# Patient Record
Sex: FEMALE | Race: WHITE | Employment: UNEMPLOYED | URBAN - METROPOLITAN AREA
[De-identification: names, ages, dates, MRNs, and addresses within clinical notes are randomized per-mention and may not be internally consistent; named-entity substitution may affect disease eponyms.]

---

## 2020-02-11 ENCOUNTER — OFFICE VISIT (OUTPATIENT)
Dept: OBGYN CLINIC | Age: 35
End: 2020-02-11

## 2020-02-11 VITALS — WEIGHT: 148 LBS

## 2020-02-11 DIAGNOSIS — Z01.419 WELL WOMAN EXAM WITH ROUTINE GYNECOLOGICAL EXAM: Primary | ICD-10-CM

## 2020-02-11 DIAGNOSIS — Z30.432 ENCOUNTER FOR IUD REMOVAL: ICD-10-CM

## 2020-02-11 NOTE — PROGRESS NOTES
Annual exam ages 21-44    Fred Kohler is a ,  27 y.o. female [facility] No LMP recorded. .    She presents for her annual checkup. She is having no significant problems. Desires IUD removal today; she and her  would like a 3rd child. She would like to discuss management of future pregnancy. History of sinus venous thrombosis diagnosed between pregnancies; was on Lovenox during 2nd pregnancy; no complications except retained POC  Hx TSVD x2; Naif Gravel now 6yo; Manuela 7lb Shaquille Tyler now 2.4yo  Reviewed collaborative practice, her hx and use of MFM. Recommend MFM consult      Menstrual status:    Her periods are moderate in flow. She is using three to five pads or tampons per day, usually regular and last 26-30 days. She denies dysmenorrhea. She reports no premenstrual symptoms. Contraception:    The current method of family planning is IUD. Sexual history:     She  reports being sexually active and has had partner(s) who are Male. .    Medical conditions:    Since her last annual GYN exam about two years ago, she has not the following changes in her health history: none. Pap and Mammogram History:    Her most recent Pap smear was normal, obtained 2 year(s) ago. Breast Cancer History/Substance Abuse: negative    Past Medical History:   Diagnosis Date    PCOS (polycystic ovarian syndrome)     Venous thrombosis     Sinus     History reviewed. No pertinent surgical history. Current Outpatient Medications   Medication Sig Dispense Refill    PNV No12-Iron-FA-DSS-OM-3 29 mg iron-1 mg -50 mg CPKD Take  by mouth. Allergies: Patient has no known allergies. Tobacco History:  reports that she has never smoked. She has never used smokeless tobacco.  Alcohol Abuse:  reports current alcohol use. Drug Abuse:  reports no history of drug use.     Family Medical/Cancer History:   Family History   Problem Relation Age of Onset    Breast Cancer Mother 39    Parkinson's Disease Father         Review of Systems - History obtained from the patient  Constitutional: negative for weight loss, fever, night sweats  HEENT: negative for hearing loss, earache, congestion, snoring, sorethroat  CV: negative for chest pain, palpitations, edema  Resp: negative for cough, shortness of breath, wheezing  GI: negative for change in bowel habits, abdominal pain, black or bloody stools  : negative for frequency, dysuria, hematuria, vaginal discharge  MSK: negative for back pain, joint pain, muscle pain  Breast: negative for breast lumps, nipple discharge, galactorrhea  Skin :negative for itching, rash, hives  Neuro: negative for dizziness, headache, confusion, weakness  Psych: negative for anxiety, depression, change in mood  Heme/lymph: negative for bleeding, bruising, pallor    Physical Exam    Visit Vitals  Wt 148 lb (67.1 kg)       Constitutional  · Appearance: well-nourished, well developed, alert, in no acute distress    HENT  · Head and Face: appears normal    Chest  · Respiratory Effort: breathing unlabored    Breasts  · Inspection of Breasts: breasts symmetrical, no skin changes, no discharge present, nipple appearance normal, no skin retraction present  · Palpation of Breasts and Axillae: no masses present on palpation, no breast tenderness  · Axillary Lymph Nodes: no lymphadenopathy present    Gastrointestinal  · Abdominal Examination: abdomen non-tender to palpation, normal bowel sounds, no masses present  · Liver and spleen: no hepatomegaly present, spleen not palpable  · Hernias: no hernias identified    Genitourinary  · External Genitalia: normal appearance for age, no discharge present, no tenderness present, no inflammatory lesions present, no masses present, no atrophy present  · Vagina: normal vaginal vault without central or paravaginal defects, no discharge present, no inflammatory lesions present, no masses present  · Bladder: non-tender to palpation  · Urethra: appears normal  · Cervix: normal IUD string visualized  · Uterus: normal size, shape and consistency  · Adnexa: no adnexal tenderness present, no adnexal masses present  · Perineum: perineum within normal limits, no evidence of trauma, no rashes or skin lesions present  · Anus: anus within normal limits, no hemorrhoids present  · Inguinal Lymph Nodes: no lymphadenopathy present    Skin  · General Inspection: no rash, no lesions identified    Neurologic/Psychiatric  · Mental Status:  · Orientation: grossly oriented to person, place and time  · Mood and Affect: mood normal, affect appropriate  IUD REMOVAL  Indications for Removal:  Gavi Amos is a ,  27 y.o. female [facility] whose No LMP recorded. was on . who presents today for IUD removal. Her current IUD was placed several years ago. She has not had any problems with the IUD. She requests removal of the IUD because considering conception. The IUD removal procedure was discussed with the patient and she had no further questions. Procedure: The patient was placed in a dorsal lithotomy position and appropriately draped. On bimanual exam the uterus was anterior and normal in size with no tenderness present. A speculum exam was performed and the cervix was visualized. The IUD string was visualized. Using ring forceps , the string was grasped and the IUD removed and immediately it was noted one arm of the paragard IUD was missing. The IUD was shown to the patient. Patient tolerated removal with no significant discomfort. Cervix was then prepped with betadine and attempt was made to remove the arm using IUD extractor and polyp forceps. Removal of the arm was not successful. .  Assessment:  Routine gynecologic examination  Incomplete removal of paragard IUD  Hx as above  Her current medical status is satisfactory with no evidence of significant gynecologic issues.     Plan:  GC/ chlamydia offered and declined  Incomplete IUD removal reviewed with pt; spontaneous expulsion of IUD arm may occur  [address] appointment in 2 weeks; should IUD arm still be detected within uterus, hysteroscopic removal will be scheduled. MFM consult will be placed once this issue is resolved.   Need for barrier contraception for now  Pap  today  Counseled re: diet, exercise, healthy lifestyle  Return for yearly wellness visits  Gardisil counseling provided  Rec screening mammo at either 34 or 39

## 2020-02-11 NOTE — PATIENT INSTRUCTIONS

## 2020-02-17 ENCOUNTER — OFFICE VISIT (OUTPATIENT)
Dept: INTERNAL MEDICINE CLINIC | Age: 35
End: 2020-02-17

## 2020-02-17 ENCOUNTER — HOSPITAL ENCOUNTER (OUTPATIENT)
Dept: LAB | Age: 35
Discharge: HOME | End: 2020-02-17

## 2020-02-17 VITALS
OXYGEN SATURATION: 96 % | HEART RATE: 66 BPM | RESPIRATION RATE: 18 BRPM | BODY MASS INDEX: 23.56 KG/M2 | WEIGHT: 150.13 LBS | HEIGHT: 67 IN | TEMPERATURE: 97.9 F | DIASTOLIC BLOOD PRESSURE: 65 MMHG | SYSTOLIC BLOOD PRESSURE: 107 MMHG

## 2020-02-17 DIAGNOSIS — E28.2 PCOS (POLYCYSTIC OVARIAN SYNDROME): ICD-10-CM

## 2020-02-17 DIAGNOSIS — Z00.00 ROUTINE ADULT HEALTH MAINTENANCE: Primary | ICD-10-CM

## 2020-02-17 PROBLEM — R51.9 HEADACHE: Status: ACTIVE | Noted: 2020-02-17

## 2020-02-17 LAB
ALBUMIN SERPL-MCNC: 3.8 G/DL (ref 3.5–5)
ALBUMIN/GLOB SERPL: 1.2 {RATIO} (ref 1.1–2.2)
ALP SERPL-CCNC: 40 U/L (ref 45–117)
ALT SERPL-CCNC: 25 U/L (ref 12–78)
ANION GAP SERPL CALC-SCNC: 3 MMOL/L (ref 5–15)
AST SERPL-CCNC: 14 U/L (ref 15–37)
BASOPHILS # BLD: 0 K/UL (ref 0–0.1)
BASOPHILS NFR BLD: 0 % (ref 0–1)
BILIRUB SERPL-MCNC: 0.5 MG/DL (ref 0.2–1)
BUN SERPL-MCNC: 17 MG/DL (ref 6–20)
BUN/CREAT SERPL: 23 (ref 12–20)
CALCIUM SERPL-MCNC: 9.4 MG/DL (ref 8.5–10.1)
CHLORIDE SERPL-SCNC: 103 MMOL/L (ref 97–108)
CHOLEST SERPL-MCNC: 167 MG/DL
CO2 SERPL-SCNC: 30 MMOL/L (ref 21–32)
CREAT SERPL-MCNC: 0.75 MG/DL (ref 0.55–1.02)
DIFFERENTIAL METHOD BLD: NORMAL
EOSINOPHIL # BLD: 0.1 K/UL (ref 0–0.4)
EOSINOPHIL NFR BLD: 1 % (ref 0–7)
ERYTHROCYTE [DISTWIDTH] IN BLOOD BY AUTOMATED COUNT: 12.2 % (ref 11.5–14.5)
EST. AVERAGE GLUCOSE BLD GHB EST-MCNC: 105 MG/DL
GLOBULIN SER CALC-MCNC: 3.2 G/DL (ref 2–4)
GLUCOSE SERPL-MCNC: 89 MG/DL (ref 65–100)
HBA1C MFR BLD: 5.3 % (ref 4–5.6)
HCT VFR BLD AUTO: 39.6 % (ref 35–47)
HDLC SERPL-MCNC: 64 MG/DL
HDLC SERPL: 2.6 {RATIO} (ref 0–5)
HGB BLD-MCNC: 12.8 G/DL (ref 11.5–16)
IMM GRANULOCYTES # BLD AUTO: 0 K/UL (ref 0–0.04)
IMM GRANULOCYTES NFR BLD AUTO: 0 % (ref 0–0.5)
LDLC SERPL CALC-MCNC: 92.6 MG/DL (ref 0–100)
LIPID PROFILE,FLP: NORMAL
LYMPHOCYTES # BLD: 2.7 K/UL (ref 0.8–3.5)
LYMPHOCYTES NFR BLD: 33 % (ref 12–49)
MCH RBC QN AUTO: 30.3 PG (ref 26–34)
MCHC RBC AUTO-ENTMCNC: 32.3 G/DL (ref 30–36.5)
MCV RBC AUTO: 93.8 FL (ref 80–99)
MONOCYTES # BLD: 0.7 K/UL (ref 0–1)
MONOCYTES NFR BLD: 8 % (ref 5–13)
NEUTS SEG # BLD: 4.7 K/UL (ref 1.8–8)
NEUTS SEG NFR BLD: 58 % (ref 32–75)
NRBC # BLD: 0 K/UL (ref 0–0.01)
NRBC BLD-RTO: 0 PER 100 WBC
PLATELET # BLD AUTO: 193 K/UL (ref 150–400)
PMV BLD AUTO: 12.3 FL (ref 8.9–12.9)
POTASSIUM SERPL-SCNC: 3.9 MMOL/L (ref 3.5–5.1)
PROT SERPL-MCNC: 7 G/DL (ref 6.4–8.2)
RBC # BLD AUTO: 4.22 M/UL (ref 3.8–5.2)
SODIUM SERPL-SCNC: 136 MMOL/L (ref 136–145)
TRIGL SERPL-MCNC: 52 MG/DL (ref ?–150)
VLDLC SERPL CALC-MCNC: 10.4 MG/DL
WBC # BLD AUTO: 8.2 K/UL (ref 3.6–11)

## 2020-02-17 NOTE — PROGRESS NOTES
Assessment and Plan   Diagnoses and all orders for this visit:    1. Routine adult health maintenance  Continue healthy habits. Recently had a Pap done with OB/GYN. Pap results still pending. Otherwise up-to-date on vaccines. Mom has a history of breast cancer at age 39. Consider starting mammo-at 39.    2. PCOS (polycystic ovarian syndrome)  -     CBC WITH AUTOMATED DIFF; Future  -     HEMOGLOBIN A1C WITH EAG; Future  -     METABOLIC PANEL, COMPREHENSIVE; Future  -     LIPID PANEL; Future    Return to clinic: 1 year for physical or earlier as needed    Shane Crespo MD  [facility]  2/17/2020    Future Appointments   Date Time Provider Chris Ann   2/17/2020  2:00 PM [facility]   2/20/2020  1:00 PM ULTRASOUND1, [address]   2/20/2020  4:79 PM Avelina Carr  [facility]        History of Present Illness   Chief Complaint   Establish care    Avani Pena is a 27 y.o. female     Recently moved from [place]. Here to establish care with a PCP. Denies any current complaints. Recently saw her OB/GYN to get her IUD out. Patient is desiring pregnancy. Still has part of it retained. Has follow-up with them. Reports sometimes having abnormal lab work but nothing is panned out. Reports that she has a history of fatigue since she was younger but all of her lab work has been negative. Review of Systems   Constitutional: Negative for chills and fever. HENT: Negative for hearing loss. Eyes: Negative for blurred vision. Respiratory: Negative for shortness of breath. Cardiovascular: Negative for chest pain. Gastrointestinal: Negative for abdominal pain, blood in stool, constipation, diarrhea, melena, nausea and vomiting. Genitourinary: Negative for dysuria and hematuria. Musculoskeletal: Negative for joint pain. Skin: Negative for rash. Neurological: Negative for headaches.         Past Medical History   No Known Allergies     Current Outpatient Medications   Medication Sig    PNV No12-Iron-FA-DSS-OM-3 29 mg iron-1 mg -50 mg CPKD Take  by mouth. No current facility-administered medications for this visit. Patient Active Problem List   Diagnosis Code    Headache R51    PCOS (polycystic ovarian syndrome) E28.2    Venous thrombosis I82.90     History reviewed. No pertinent surgical history. Social History     Tobacco Use    Smoking status: Never Smoker    Smokeless tobacco: Never Used   Substance Use Topics    Alcohol use: Yes     Comment: 2-4 drinks/week      Family History   Problem Relation Age of Onset    Breast Cancer Mother 39    High Cholesterol Mother     Parkinson's Disease Father         Physical Exam   Vitals:       Visit Vitals  /65 (BP 1 Location: Left arm, BP Patient Position: Sitting)   Pulse 66   Temp 97.9 °F (36.6 °C) (Oral)   Resp 18   Ht 5' 7\" (1.702 m)   Wt 150 lb 2 oz (68.1 kg)   LMP 02/07/2020   SpO2 96%   BMI 23.51 kg/m²        Physical Exam  Constitutional:       General: She is not in acute distress. Appearance: She is well-developed. HENT:      Right Ear: Tympanic membrane, ear canal and external ear normal.      Left Ear: Tympanic membrane, ear canal and external ear normal.      Nose: Nose normal.      Mouth/Throat:      Mouth: Mucous membranes are moist.      Pharynx: No posterior oropharyngeal erythema. Eyes:      Conjunctiva/sclera: Conjunctivae normal.      Pupils: Pupils are equal, round, and reactive to light. Neck:      Musculoskeletal: Neck supple. Cardiovascular:      Rate and Rhythm: Normal rate and regular rhythm. Pulses: Normal pulses. Heart sounds: No murmur. No friction rub. No gallop. Pulmonary:      Effort: No respiratory distress. Breath sounds: No wheezing or rales. Abdominal:      General: Bowel sounds are normal. There is no distension. Palpations: Abdomen is soft.  There is no hepatomegaly, splenomegaly or mass. Tenderness: There is no abdominal tenderness. Hernia: No hernia is present. Skin:     General: Skin is warm. Findings: No rash. Neurological:      Mental Status: She is alert. Psychiatric:         Mood and Affect: Mood normal.         Thought Content:  Thought content normal.         Judgment: Judgment normal.          Voice recognition software is utilized and this note may contain transcription errors

## 2020-02-20 ENCOUNTER — OFFICE VISIT (OUTPATIENT)
Dept: OBGYN CLINIC | Age: 35
End: 2020-02-20

## 2020-02-20 VITALS — BODY MASS INDEX: 23.54 KG/M2 | WEIGHT: 150 LBS | HEIGHT: 67 IN

## 2020-02-20 DIAGNOSIS — T83.31XD MECHANICAL BREAKDOWN OF INTRAUTERINE CONTRACEPTIVE DEVICE, SUBSEQUENT ENCOUNTER: Primary | ICD-10-CM

## 2020-02-20 NOTE — PROGRESS NOTES
[facility] AT [address] [place] [address]  OFFICE PROCEDURE PROGRESS NOTE        Chart reviewed for the following:   Michele Gamboa RN, have reviewed the History, Physical and updated the Allergic reactions for [address] performed immediately prior to start of procedure:   Michele Gamboa RN, have performed the following reviews on Glen Frias prior to the start of the procedure:            * Patient was identified by name and date of birth   * Agreement on procedure being performed was verified  * Risks and Benefits explained to the patient  * Procedure site verified and marked as necessary  * Patient was positioned for comfort  * Consent was signed and verified     Time: 2pm      Date of procedure: 2020    Procedure performed by:  Rita Portillo MD    Provider assisted by: Yuly Bolivar RN    Patient assisted by: self    How tolerated by patient: tolerated the procedure well with no complications    Post Procedural Pain Scale: 2 - Hurts Little Bit    Comments: none          IUD REMOVAL  Indications for Removal:  Glen Frias is a ,  27 y.o. female [facility] whose Patient's last menstrual period was 2020. was on . who presents today for IUD removal. Her current IUD was placed years ago. She has not had any problems with the IUD but on removing device, one arm was retained  She requests removal of the IUD arm  The IUD removal procedure was discussed with the patient and she had no further questions. Procedure: The patient was placed in a dorsal lithotomy position and appropriately draped. On bimanual exam the uterus was anterior and normal in size with no tenderness present. A speculum exam was performed and the cervix was visualized.    Allis tenaculum placed on cervical anterior lip and the IUD extractor placed into cervical canal.  The IUD arm could be easily visualized at the upper aspect of the cervical canal and despite several attempts under US guidance using polyp forceps and IUD extractor could not be removed. Patient tolerated attemptl with no significant discomfort. All instruments removed and excellent hemostasis noted. Patient will be posted for hysteroscopic IUD arm removal under MAC. Patient was fully  counseled concerning risks of surgery include bleeding, transfusion, infection, readmission, abscess drainage, injury to abdominal organs including bowel, bladder, ureters, vessels, nerves, need for additional surgery, injury may not be recognized at time of surgery, and risk of death.

## 2020-02-24 DIAGNOSIS — T83.31XD MECHANICAL BREAKDOWN OF INTRAUTERINE CONTRACEPTIVE DEVICE, SUBSEQUENT ENCOUNTER: Primary | ICD-10-CM

## 2020-02-28 ENCOUNTER — HOSPITAL ENCOUNTER (OUTPATIENT)
Age: 35
Setting detail: OUTPATIENT SURGERY
Discharge: HOME | End: 2020-02-28
Attending: OBSTETRICS & GYNECOLOGY | Admitting: OBSTETRICS & GYNECOLOGY
Payer: COMMERCIAL

## 2020-02-28 ENCOUNTER — ANESTHESIA (OUTPATIENT)
Dept: SURGERY | Age: 35
End: 2020-02-28
Payer: COMMERCIAL

## 2020-02-28 ENCOUNTER — ANESTHESIA EVENT (OUTPATIENT)
Dept: SURGERY | Age: 35
End: 2020-02-28
Payer: COMMERCIAL

## 2020-02-28 VITALS
TEMPERATURE: 98 F | DIASTOLIC BLOOD PRESSURE: 61 MMHG | WEIGHT: 150 LBS | BODY MASS INDEX: 23.54 KG/M2 | HEART RATE: 42 BPM | RESPIRATION RATE: 19 BRPM | OXYGEN SATURATION: 100 % | SYSTOLIC BLOOD PRESSURE: 102 MMHG | HEIGHT: 67 IN

## 2020-02-28 DIAGNOSIS — T83.31XD MECHANICAL BREAKDOWN OF INTRAUTERINE CONTRACEPTIVE DEVICE, SUBSEQUENT ENCOUNTER: ICD-10-CM

## 2020-02-28 LAB — HCG UR QL: NEGATIVE

## 2020-02-28 PROCEDURE — 74011250636 HC RX REV CODE- 250/636: Performed by: OBSTETRICS & GYNECOLOGY

## 2020-02-28 PROCEDURE — 74011000250 HC RX REV CODE- 250: Performed by: OBSTETRICS & GYNECOLOGY

## 2020-02-28 PROCEDURE — 74011250637 HC RX REV CODE- 250/637: Performed by: NURSE ANESTHETIST, CERTIFIED REGISTERED

## 2020-02-28 PROCEDURE — 77030019905 HC CATH URETH INTMIT MDII -A: Performed by: OBSTETRICS & GYNECOLOGY

## 2020-02-28 PROCEDURE — 77030040922 HC BLNKT HYPOTHRM STRY -A

## 2020-02-28 PROCEDURE — 76210000016 HC OR PH I REC 1 TO 1.5 HR: Performed by: OBSTETRICS & GYNECOLOGY

## 2020-02-28 PROCEDURE — 77030020268 HC MISC GENERAL SUPPLY: Performed by: OBSTETRICS & GYNECOLOGY

## 2020-02-28 PROCEDURE — 77030018836 HC SOL IRR NACL ICUM -A: Performed by: OBSTETRICS & GYNECOLOGY

## 2020-02-28 PROCEDURE — 77030003666 HC NDL SPINAL BD -A: Performed by: OBSTETRICS & GYNECOLOGY

## 2020-02-28 PROCEDURE — 74011250636 HC RX REV CODE- 250/636: Performed by: ANESTHESIOLOGY

## 2020-02-28 PROCEDURE — 76210000021 HC REC RM PH II 0.5 TO 1 HR: Performed by: OBSTETRICS & GYNECOLOGY

## 2020-02-28 PROCEDURE — 81025 URINE PREGNANCY TEST: CPT

## 2020-02-28 PROCEDURE — 76060000032 HC ANESTHESIA 0.5 TO 1 HR: Performed by: OBSTETRICS & GYNECOLOGY

## 2020-02-28 PROCEDURE — 76010000138 HC OR TIME 0.5 TO 1 HR: Performed by: OBSTETRICS & GYNECOLOGY

## 2020-02-28 PROCEDURE — 74011250636 HC RX REV CODE- 250/636: Performed by: NURSE ANESTHETIST, CERTIFIED REGISTERED

## 2020-02-28 RX ORDER — FENTANYL CITRATE 50 UG/ML
25 INJECTION, SOLUTION INTRAMUSCULAR; INTRAVENOUS
Status: DISCONTINUED | OUTPATIENT
Start: 2020-02-28 | End: 2020-02-28 | Stop reason: HOSPADM

## 2020-02-28 RX ORDER — SODIUM CHLORIDE, SODIUM LACTATE, POTASSIUM CHLORIDE, CALCIUM CHLORIDE 600; 310; 30; 20 MG/100ML; MG/100ML; MG/100ML; MG/100ML
INJECTION, SOLUTION INTRAVENOUS
Status: DISCONTINUED | OUTPATIENT
Start: 2020-02-28 | End: 2020-02-28 | Stop reason: HOSPADM

## 2020-02-28 RX ORDER — MORPHINE SULFATE 10 MG/ML
2 INJECTION, SOLUTION INTRAMUSCULAR; INTRAVENOUS
Status: DISCONTINUED | OUTPATIENT
Start: 2020-02-28 | End: 2020-02-28 | Stop reason: HOSPADM

## 2020-02-28 RX ORDER — ROPIVACAINE HYDROCHLORIDE 5 MG/ML
30 INJECTION, SOLUTION EPIDURAL; INFILTRATION; PERINEURAL AS NEEDED
Status: DISCONTINUED | OUTPATIENT
Start: 2020-02-28 | End: 2020-02-28 | Stop reason: HOSPADM

## 2020-02-28 RX ORDER — KETOROLAC TROMETHAMINE 30 MG/ML
INJECTION, SOLUTION INTRAMUSCULAR; INTRAVENOUS AS NEEDED
Status: DISCONTINUED | OUTPATIENT
Start: 2020-02-28 | End: 2020-02-28 | Stop reason: HOSPADM

## 2020-02-28 RX ORDER — SODIUM CHLORIDE 0.9 % (FLUSH) 0.9 %
5-40 SYRINGE (ML) INJECTION EVERY 8 HOURS
Status: DISCONTINUED | OUTPATIENT
Start: 2020-02-28 | End: 2020-02-28 | Stop reason: HOSPADM

## 2020-02-28 RX ORDER — LIDOCAINE HYDROCHLORIDE 10 MG/ML
INJECTION INFILTRATION; PERINEURAL AS NEEDED
Status: DISCONTINUED | OUTPATIENT
Start: 2020-02-28 | End: 2020-02-28 | Stop reason: HOSPADM

## 2020-02-28 RX ORDER — FENTANYL CITRATE 50 UG/ML
50 INJECTION, SOLUTION INTRAMUSCULAR; INTRAVENOUS AS NEEDED
Status: DISCONTINUED | OUTPATIENT
Start: 2020-02-28 | End: 2020-02-28 | Stop reason: HOSPADM

## 2020-02-28 RX ORDER — FENTANYL CITRATE 50 UG/ML
INJECTION, SOLUTION INTRAMUSCULAR; INTRAVENOUS AS NEEDED
Status: DISCONTINUED | OUTPATIENT
Start: 2020-02-28 | End: 2020-02-28 | Stop reason: HOSPADM

## 2020-02-28 RX ORDER — SODIUM CHLORIDE 0.9 % (FLUSH) 0.9 %
5-40 SYRINGE (ML) INJECTION AS NEEDED
Status: DISCONTINUED | OUTPATIENT
Start: 2020-02-28 | End: 2020-02-28 | Stop reason: HOSPADM

## 2020-02-28 RX ORDER — MIDAZOLAM HYDROCHLORIDE 1 MG/ML
INJECTION, SOLUTION INTRAMUSCULAR; INTRAVENOUS AS NEEDED
Status: DISCONTINUED | OUTPATIENT
Start: 2020-02-28 | End: 2020-02-28 | Stop reason: HOSPADM

## 2020-02-28 RX ORDER — SCOLOPAMINE TRANSDERMAL SYSTEM 1 MG/1
PATCH, EXTENDED RELEASE TRANSDERMAL AS NEEDED
Status: DISCONTINUED | OUTPATIENT
Start: 2020-02-28 | End: 2020-02-28 | Stop reason: HOSPADM

## 2020-02-28 RX ORDER — PROPOFOL 10 MG/ML
INJECTION, EMULSION INTRAVENOUS AS NEEDED
Status: DISCONTINUED | OUTPATIENT
Start: 2020-02-28 | End: 2020-02-28 | Stop reason: HOSPADM

## 2020-02-28 RX ORDER — ONDANSETRON 2 MG/ML
4 INJECTION INTRAMUSCULAR; INTRAVENOUS AS NEEDED
Status: DISCONTINUED | OUTPATIENT
Start: 2020-02-28 | End: 2020-02-28 | Stop reason: HOSPADM

## 2020-02-28 RX ORDER — ONDANSETRON 2 MG/ML
INJECTION INTRAMUSCULAR; INTRAVENOUS AS NEEDED
Status: DISCONTINUED | OUTPATIENT
Start: 2020-02-28 | End: 2020-02-28 | Stop reason: HOSPADM

## 2020-02-28 RX ORDER — CEFAZOLIN SODIUM/WATER 2 G/20 ML
2 SYRINGE (ML) INTRAVENOUS ONCE
Status: COMPLETED | OUTPATIENT
Start: 2020-02-28 | End: 2020-02-28

## 2020-02-28 RX ORDER — SODIUM CHLORIDE, SODIUM LACTATE, POTASSIUM CHLORIDE, CALCIUM CHLORIDE 600; 310; 30; 20 MG/100ML; MG/100ML; MG/100ML; MG/100ML
25 INJECTION, SOLUTION INTRAVENOUS CONTINUOUS
Status: DISCONTINUED | OUTPATIENT
Start: 2020-02-28 | End: 2020-02-28 | Stop reason: HOSPADM

## 2020-02-28 RX ORDER — MIDAZOLAM HYDROCHLORIDE 1 MG/ML
1 INJECTION, SOLUTION INTRAMUSCULAR; INTRAVENOUS AS NEEDED
Status: DISCONTINUED | OUTPATIENT
Start: 2020-02-28 | End: 2020-02-28 | Stop reason: HOSPADM

## 2020-02-28 RX ORDER — ACETAMINOPHEN 325 MG/1
650 TABLET ORAL ONCE
Status: DISCONTINUED | OUTPATIENT
Start: 2020-02-28 | End: 2020-02-28 | Stop reason: HOSPADM

## 2020-02-28 RX ORDER — SODIUM CHLORIDE 9 MG/ML
25 INJECTION, SOLUTION INTRAVENOUS CONTINUOUS
Status: DISCONTINUED | OUTPATIENT
Start: 2020-02-28 | End: 2020-02-28 | Stop reason: HOSPADM

## 2020-02-28 RX ORDER — DEXAMETHASONE SODIUM PHOSPHATE 4 MG/ML
INJECTION, SOLUTION INTRA-ARTICULAR; INTRALESIONAL; INTRAMUSCULAR; INTRAVENOUS; SOFT TISSUE AS NEEDED
Status: DISCONTINUED | OUTPATIENT
Start: 2020-02-28 | End: 2020-02-28 | Stop reason: HOSPADM

## 2020-02-28 RX ORDER — OXYCODONE HYDROCHLORIDE 5 MG/1
5 TABLET ORAL AS NEEDED
Status: DISCONTINUED | OUTPATIENT
Start: 2020-02-28 | End: 2020-02-28 | Stop reason: HOSPADM

## 2020-02-28 RX ORDER — LIDOCAINE HYDROCHLORIDE 10 MG/ML
0.1 INJECTION, SOLUTION EPIDURAL; INFILTRATION; INTRACAUDAL; PERINEURAL AS NEEDED
Status: DISCONTINUED | OUTPATIENT
Start: 2020-02-28 | End: 2020-02-28 | Stop reason: HOSPADM

## 2020-02-28 RX ORDER — DIPHENHYDRAMINE HYDROCHLORIDE 50 MG/ML
12.5 INJECTION, SOLUTION INTRAMUSCULAR; INTRAVENOUS AS NEEDED
Status: DISCONTINUED | OUTPATIENT
Start: 2020-02-28 | End: 2020-02-28 | Stop reason: HOSPADM

## 2020-02-28 RX ORDER — SODIUM CHLORIDE, SODIUM LACTATE, POTASSIUM CHLORIDE, CALCIUM CHLORIDE 600; 310; 30; 20 MG/100ML; MG/100ML; MG/100ML; MG/100ML
100 INJECTION, SOLUTION INTRAVENOUS CONTINUOUS
Status: DISCONTINUED | OUTPATIENT
Start: 2020-02-28 | End: 2020-02-28 | Stop reason: HOSPADM

## 2020-02-28 RX ORDER — MIDAZOLAM HYDROCHLORIDE 1 MG/ML
0.5 INJECTION, SOLUTION INTRAMUSCULAR; INTRAVENOUS
Status: DISCONTINUED | OUTPATIENT
Start: 2020-02-28 | End: 2020-02-28 | Stop reason: HOSPADM

## 2020-02-28 RX ORDER — HYDROMORPHONE HYDROCHLORIDE 1 MG/ML
0.2 INJECTION, SOLUTION INTRAMUSCULAR; INTRAVENOUS; SUBCUTANEOUS
Status: DISCONTINUED | OUTPATIENT
Start: 2020-02-28 | End: 2020-02-28 | Stop reason: HOSPADM

## 2020-02-28 RX ADMIN — ONDANSETRON HYDROCHLORIDE 4 MG: 2 INJECTION, SOLUTION INTRAMUSCULAR; INTRAVENOUS at 13:14

## 2020-02-28 RX ADMIN — FENTANYL CITRATE 25 MCG: 50 INJECTION, SOLUTION INTRAMUSCULAR; INTRAVENOUS at 13:15

## 2020-02-28 RX ADMIN — PROPOFOL 50 MG: 10 INJECTION, EMULSION INTRAVENOUS at 13:22

## 2020-02-28 RX ADMIN — Medication 2 G: at 13:15

## 2020-02-28 RX ADMIN — SODIUM CHLORIDE, POTASSIUM CHLORIDE, SODIUM LACTATE AND CALCIUM CHLORIDE: 600; 310; 30; 20 INJECTION, SOLUTION INTRAVENOUS at 12:54

## 2020-02-28 RX ADMIN — DEXAMETHASONE SODIUM PHOSPHATE 4 MG: 4 INJECTION, SOLUTION INTRAMUSCULAR; INTRAVENOUS at 13:14

## 2020-02-28 RX ADMIN — PROPOFOL 25 MG: 10 INJECTION, EMULSION INTRAVENOUS at 13:33

## 2020-02-28 RX ADMIN — PROPOFOL 50 MG: 10 INJECTION, EMULSION INTRAVENOUS at 13:12

## 2020-02-28 RX ADMIN — PROPOFOL 25 MG: 10 INJECTION, EMULSION INTRAVENOUS at 13:25

## 2020-02-28 RX ADMIN — DEXAMETHASONE SODIUM PHOSPHATE 4 MG: 4 INJECTION, SOLUTION INTRAMUSCULAR; INTRAVENOUS at 13:43

## 2020-02-28 RX ADMIN — SCOPALAMINE 1 PATCH: 1 PATCH, EXTENDED RELEASE TRANSDERMAL at 13:03

## 2020-02-28 RX ADMIN — KETOROLAC TROMETHAMINE 30 MG: 30 INJECTION, SOLUTION INTRAMUSCULAR; INTRAVENOUS at 13:42

## 2020-02-28 RX ADMIN — PROPOFOL 25 MG: 10 INJECTION, EMULSION INTRAVENOUS at 13:28

## 2020-02-28 RX ADMIN — PROPOFOL 50 MG: 10 INJECTION, EMULSION INTRAVENOUS at 13:17

## 2020-02-28 RX ADMIN — FENTANYL CITRATE 75 MCG: 50 INJECTION, SOLUTION INTRAMUSCULAR; INTRAVENOUS at 13:48

## 2020-02-28 RX ADMIN — PROPOFOL 50 MG: 10 INJECTION, EMULSION INTRAVENOUS at 13:14

## 2020-02-28 RX ADMIN — MIDAZOLAM 2 MG: 1 INJECTION INTRAMUSCULAR; INTRAVENOUS at 13:03

## 2020-02-28 RX ADMIN — DIPHENHYDRAMINE HYDROCHLORIDE 12.5 MG: 50 INJECTION, SOLUTION INTRAMUSCULAR; INTRAVENOUS at 14:10

## 2020-02-28 RX ADMIN — PROPOFOL 50 MG: 10 INJECTION, EMULSION INTRAVENOUS at 13:39

## 2020-02-28 NOTE — H&P
Osito Burrell is a ,  27 y.o. female [facility]  No LMP recorded. .     Had paragard IUD removal 2020 but it was noted one arm of IUD retained. Attempt at [address] guided removal performed without success on 20.     She is having no other significant problems.       she and her  would like a 3rd child. She would like to discuss management of future pregnancy. History of sinus venous thrombosis diagnosed between pregnancies; was on Lovenox during 2nd pregnancy; no complications except retained POC  Hx TSVD x2; Anastasiya Client now 8yo; Manuela 7lb Acie Heads now 2.6yo  Reviewed collaborative practice, her hx and use of MFM. Recommend MFM consult        Menstrual status:     Her periods are moderate in flow. She is using three to five pads or tampons per day, usually regular and last 26-30 days.     She denies dysmenorrhea.     She reports no premenstrual symptoms.     Contraception:     The current method of family planning is IUD.     Sexual history:      She  reports being sexually active and has had partner(s) who are Male. .     Medical conditions:     Since her last annual GYN exam about two years ago, she has not the following changes in her health history: none.      Pap and Mammogram History:     Her most recent Pap smear was normal, obtained 2 year(s) ago.        Breast Cancer History/Substance Abuse: negative          Past Medical History:   Diagnosis Date    PCOS (polycystic ovarian syndrome)      Venous thrombosis      Sinus      History reviewed. No pertinent surgical history.            Current Outpatient Medications   Medication Sig Dispense Refill    PNV No12-Iron-FA-DSS-OM-3 29 mg iron-1 mg -50 mg CPKD Take  by mouth.          Allergies: Patient has no known allergies.      Tobacco History:  reports that she has never smoked. She has never used smokeless tobacco.  Alcohol Abuse:  reports current alcohol use.   Drug Abuse:  reports no history of drug use.     Family Medical/Cancer History:         Family History   Problem Relation Age of Onset    Breast Cancer Mother 39    Parkinson's Disease Father           Review of Systems - History obtained from the patient  Constitutional: negative for weight loss, fever, night sweats  HEENT: negative for hearing loss, earache, congestion, snoring, sorethroat  CV: negative for chest pain, palpitations, edema  Resp: negative for cough, shortness of breath, wheezing  GI: negative for change in bowel habits, abdominal pain, black or bloody stools  : negative for frequency, dysuria, hematuria, vaginal discharge  MSK: negative for back pain, joint pain, muscle pain  Breast: negative for breast lumps, nipple discharge, galactorrhea  Skin :negative for itching, rash, hives  Neuro: negative for dizziness, headache, confusion, weakness  Psych: negative for anxiety, depression, change in mood  Heme/lymph: negative for bleeding, bruising, pallor     Physical Exam     Visit Vitals  Wt 148 lb (67.1 kg)         Constitutional  · Appearance: well-nourished, well developed, alert, in no acute distress     HENT  · Head and Face: appears normal     Chest  · Respiratory Effort: breathing unlabored     ·       Gastrointestinal  · Abdominal Examination: abdomen non-tender to palpation, normal bowel sounds, no masses present  · Liver and spleen: no hepatomegaly present, spleen not palpable  · Hernias: no hernias identified     Genitourinary  · External Genitalia: normal appearance for age, no discharge present, no tenderness present, no inflammatory lesions present, no masses present, no atrophy present  · Vagina: normal vaginal vault without central or paravaginal defects, no discharge present, no inflammatory lesions present, no masses present  · Bladder: non-tender to palpation  · Urethra: appears normal  · Cervix: normal           · Uterus: normal size, shape and consistency  · Adnexa: no adnexal tenderness present, no adnexal masses present  · Perineum: perineum within normal limits, no evidence of trauma, no rashes or skin lesions present  · Anus: anus within normal limits, no hemorrhoids present  · Inguinal Lymph Nodes: no lymphadenopathy present     Skin  · General Inspection: no rash, no lesions identified     Neurologic/Psychiatric  · Mental Status:  · Orientation: grossly oriented to person, place and time  · Mood and Affect: mood normal, affect appropriate  Assessment:  Retained IUD arm  Plan:  Proceed with hysteroscopic IUD arm removal.  Patient was fully  counseled concerning risks of surgery include bleeding, transfusion, infection, readmission, abscess drainage, injury to abdominal organs including bowel, bladder, ureters, vessels, nerves, need for additional surgery, injury may not be recognized at time of surgery, and risk of death.   2g ancef preop

## 2020-02-28 NOTE — PERIOP NOTES
385 deficit on fluid balance. Patient: Janeen Gonsalves MRN: [id number]  SSN: [id number]   YOB: 1985  Age: 27 y.o. Sex: female     Patient is status post Procedure(s): HYSTEROSCOPY REMOVAL IUD ARM.     Surgeon(s) and Role:     * Ken Lacy MD - Primary    Local/Dose/Irrigation:  5ml of lidocaine 1%                  Peripheral IV 02/28/20 Left Hand (Active)                           Dressing/Packing: peripad

## 2020-02-28 NOTE — BRIEF OP NOTE
BRIEF OPERATIVE NOTE    Date of Procedure: 2/28/2020   Preoperative Diagnosis: IUD EMBEDDED UTERINE WALL  Postoperative Diagnosis: IUD EMBEDDED UTERINE WALL    Procedure(s):   HYSTEROSCOPY REMOVAL IUD ARM  Surgeon(s) and Role:     * Dick Jin MD - Primary              Surgical Staff:  Circ-1: Janeth Kaminski RN  Circ-2: Darilyn Sandhoff, RN; Priscilla Nino  Scrub Tech-1: [place]  Event Time In Time Out   Incision Start 1322    Incision Close 1342      Anesthesia: General   Estimated Blood Loss: <5cc  Specimens: * No specimens in log *   Findings: paragard IUD arm embedded in ALLAN at cervical canal; fully removed and identified by surgeon and OR staff  Complications: none  Implants: * No implants in log *

## 2020-02-28 NOTE — ANESTHESIA PREPROCEDURE EVALUATION
Relevant Problems   No relevant active problems       Anesthetic History   No history of anesthetic complications            Review of Systems / Medical History  Patient summary reviewed, nursing notes reviewed and pertinent labs reviewed    Pulmonary  Within defined limits                 Neuro/Psych   Within defined limits           Cardiovascular                  Exercise tolerance: >4 METS     GI/Hepatic/Renal  Within defined limits              Endo/Other  Within defined limits           Other Findings              Physical Exam    Airway  Mallampati: I    Neck ROM: normal range of motion   Mouth opening: Normal     Cardiovascular    Rhythm: regular  Rate: normal         Dental  No notable dental hx       Pulmonary  Breath sounds clear to auscultation               Abdominal         Other Findings            Anesthetic Plan    ASA: 1  Anesthesia type: general          Induction: Intravenous  Anesthetic plan and risks discussed with: Patient

## 2020-03-02 NOTE — ANESTHESIA POSTPROCEDURE EVALUATION
Post-Anesthesia Evaluation and Assessment    Patient: Xavi Gann MRN: [id number]  SSN: [id number]    YOB: 1985  Age: 27 y.o. Sex: female      I have evaluated the patient and they are stable and ready for discharge from the PACU. Cardiovascular Function/Vital Signs  Visit Vitals  /61   Pulse (!) 42   Temp 36.7 °C (98 °F)   Resp 19   Ht 5' 7\" (1.702 m)   Wt 68 kg (150 lb)   SpO2 100%   BMI 23.49 kg/m²       Patient is status post General anesthesia for Procedure(s): HYSTEROSCOPY REMOVAL IUD ARM. Nausea/Vomiting: None    Postoperative hydration reviewed and adequate. Pain:  Pain Scale 1: Numeric (0 - 10) (02/28/20 1445)  Pain Intensity 1: 2 (02/28/20 1445)   Managed    Neurological Status:   Neuro (WDL): Within Defined Limits (02/28/20 1445)   At baseline    Mental Status, Level of Consciousness: Alert and  oriented to person, place, and time    Pulmonary Status:   O2 Device: Room air (02/28/20 1445)   Adequate oxygenation and airway patent    Complications related to anesthesia: None    Post-anesthesia assessment completed. No concerns    Signed By: Ciera Arceo MD     March 2, 2020              Procedure(s): HYSTEROSCOPY REMOVAL IUD ARM.     general    <BSHSIANPOST>    Vitals Value Taken Time   /61 2/28/2020  2:45 PM   Temp 36.7 °C (98 °F) 2/28/2020  2:45 PM   Pulse 42 2/28/2020  2:45 PM   Resp 19 2/28/2020  2:45 PM   SpO2 100 % 2/28/2020  2:45 PM

## 2020-03-02 NOTE — OP NOTES
[address]   OPERATIVE REPORT    Name:  Megan Claros  MR#:  [id number]  :  1985  ACCOUNT #:  [de-identified]  DATE OF SERVICE:  2020      PREOPERATIVE DIAGNOSIS:  Partial ParaGard intrauterine device embedded in uterine wall. POSTOPERATIVE DIAGNOSIS:  Partial ParaGard intrauterine device embedded in uterine wall. PROCEDURE PERFORMED:  Hysteroscopic removal of intrauterine device arm. SURGEON:  Jeannie Hernandez MD       ANESTHESIA:  MAC.    COMPLICATIONS:  None. SPECIMENS REMOVED:  paragard arm    IMPLANTS:  None. ESTIMATED BLOOD LOSS:  Less than 5 mL. FINDINGS:  ParaGard IUD arm embedded in lower uterine segment at entrance of cervical canal, fully removed and identified by surgeon and OR staff. INDICATIONS:  The patient is a 69-year-old white female who at the time of removing her ParaGard IUD in the office, was noted to be missing one of the arms. Attempted removal blindly and with ultrasound guidance was attempted in the office. The portion of IUD could be clearly visualized on ultrasound at the junction of the cervical canal and the uterine cavity. PROCEDURE:  Following verification and signed consent, the patient was brought back to the operating room. After induction of sufficient IV sedation, she was placed in dorsal lithotomy position, prepped and draped in the usual sterile manner. Anterior cervical lip was grasped with single-tooth tenaculum and the cervix was gently and easily dilated to allow an operative hysteroscope to be entered into the uterine cavity. The uterine cavity could be easily identified, both tubal ostia visualized. There were no gross defects. Initially, it was quite difficult to actually see the IUD arm. However, on careful exam, a small portion of it was seen at the entrance of the cervical canal and it was indeed deeply embedded into the uterine tissue.   A grasper was placed through the hysteroscope and the arm was gently teased free. It was removed in its entirety. Excellent hemostasis was noted at the conclusion of the case. All instruments were removed. The patient, having tolerated the procedure well, was taken back to the recovery room in stable condition.         MD MARIA ESTHER Huntley/[email or web address]  D:  03/02/2020 12:27  T:  03/02/2020 14:10  JOB #:  [id number]

## 2020-04-01 ENCOUNTER — PATIENT MESSAGE (OUTPATIENT)
Dept: OBGYN CLINIC | Age: 35
End: 2020-04-01

## 2020-04-01 NOTE — TELEPHONE ENCOUNTER
----- Message from Jd Barreto sent at 4/1/2020  2:24 PM EDT -----  Regarding: RE: Non-Urgent Medical Question  Contact: [phone]  Thank you so much Dr. Bianka Campbell! I almost cant believe it myself. Ill be on the look-out for the call from your nurse, and Ill see you soon!

## 2020-04-01 NOTE — TELEPHONE ENCOUNTER
Called and spoke with patient. She is scheduled on 04/27 for an early ob ultrasound and visit with Dr. Gilma Peters.

## 2020-04-27 ENCOUNTER — INITIAL PRENATAL (OUTPATIENT)
Dept: OBGYN CLINIC | Age: 35
End: 2020-04-27

## 2020-04-27 VITALS — HEIGHT: 67 IN | WEIGHT: 151 LBS | BODY MASS INDEX: 23.7 KG/M2

## 2020-04-27 DIAGNOSIS — Z34.80 PRENATAL CARE OF MULTIGRAVIDA, ANTEPARTUM: ICD-10-CM

## 2020-04-27 DIAGNOSIS — O09.521 ENCOUNTER FOR SUPERVISION OF MULTIGRAVIDA OF ADVANCED MATERNAL AGE IN FIRST TRIMESTER: Primary | ICD-10-CM

## 2020-04-27 RX ORDER — ENOXAPARIN SODIUM 100 MG/ML
40 INJECTION SUBCUTANEOUS DAILY
Qty: 30 SYRINGE | Refills: 5 | Status: SHIPPED | OUTPATIENT
Start: 2020-04-27 | End: 2020-06-03 | Stop reason: SDUPTHER

## 2020-04-27 RX ORDER — AZELAIC ACID 0.15 G/G
GEL TOPICAL 2 TIMES DAILY
Qty: 50 G | Refills: 2 | Status: SHIPPED | OUTPATIENT
Start: 2020-04-27 | End: 2020-07-24

## 2020-04-27 NOTE — PROGRESS NOTES
Current pregnancy history:    March Bloch is a  27 y.o. female [facility] Patient's last menstrual period was 2020 (exact date). .  She presents for the evaluation of amenorrhea and a positive pregnancy test.    LMP history:  The date of her LMP is 96 certain. Her last menstrual period was normal.  A urine pregnancy test was positive      Based on her LMP, her EDC is 20 and her EGA is 7 weeks,3 days. Her menstrual cycles are regular and occur approximately every 28 days  and range from 3 to 5 days. Ultrasound data:  She had an  ultrasound done by the ultrasound tech today which revealed a viable emerson pregnancy with a gestational age of 9 weeks and 4 days giving an [address] of 12/10/20. Pregnancy symptoms:    Since her LMP she has experienced  urinary frequency, breast tenderness, and nausea. She has not been vomiting over the last few weeks. Associated signs and symptoms which she denies: dysuria, discharge, vaginal bleeding. Relevant past pregnancy history:   She has the following pregnancy history: Her last pregnancy was  uncomplicated. She has no history of  delivery. Relevant past medical history:(relevant to this pregnancy): noncontributory. Pap/Occupational history:  Last pap smear: last year Results: Normal            Substance history: negative for alcohol, tobacco and street drugs. Positive for nothing. Exposure history: There is/are no indoor cat/s in the home. The patient was instructed to not change the cat litter. She admits close contact with children on a regular basis. She has had chicken pox or the vaccine in the past.   Patient denies issues with domestic violence. Genetic Screening/Teratology Counseling: (Includes patient, baby's father, or anyone in either family with:)  3.  Patient's age >/= 28 at EDC?--no  2.   Thalassemia ([place], [place], [address], or  background): MCV<80?--no.     3.  Neural tube defect (meningomyelocele, spina bifida, anencephaly)?--no.   4.  Congenital heart defect?--no.  5.  Down syndrome?--no.   6.  Slim-Sachs ([place], [place])?--no.   7.  Canavan's Disease?--no.   8.  Familial Dysautonomia?--no.   9.  Sickle cell disease or trait ()? --no   The patient has not been tested for sickle trait  10. Hemophilia or other blood disorders?--no. 11.  Muscular dystrophy?--no. 12.  Cystic fibrosis?--no. 13.  [place]'s Chorea?--no. 14.  Mental retardation/autism (if yes was person tested for Fragile X)?--no. 15.  Other inherited genetic or chromosomal disorder?--no. 12.  Maternal metabolic disorder (DM, PKU, etc)?--no. 17.  Patient or FOB with a child with a birth defect not listed above?--no.  17a. Patient or FOB with a birth defect themselves?--no. 18.  Recurrent pregnancy loss, or stillbirth?--no. 19.  Any medications since LMP other than prenatal vitamins (include vitamins, supplements, OTC meds, drugs, alcohol)?--no. 20.  Any other genetic/environmental exposure to discuss?--no. Infection History:  1. Lives with someone with TB or TB exposed?--no.   2.  Patient or partner has history of genital herpes?--no.  3.  Rash or viral illness since LMP?--no.    4.  History of STD (GC, CT, HPV, syphilis, HIV)? --no   5. Other: OTHER? Past Medical History:   Diagnosis Date    Headache     PCOS (polycystic ovarian syndrome)     Venous thrombosis 2016    Sinus     History reviewed. No pertinent surgical history.   Social History     Occupational History    Not on file   Tobacco Use    Smoking status: Never Smoker    Smokeless tobacco: Never Used   Substance and Sexual Activity    Alcohol use: Yes     Comment: 2-4 drinks/week    Drug use: Never    Sexual activity: Yes     Partners: Male     Family History   Problem Relation Age of Onset    Breast Cancer Mother 39    High Cholesterol Mother     Parkinson's Disease Father      OB History    Para Term  AB Living   3 2 2         SAB TAB Ectopic Molar Multiple Live Births                    # Outcome Date GA Lbr Go/2nd Weight Sex Delivery Anes PTL Lv   3 Current            2 Term            1 Term              No Known Allergies  Prior to Admission medications    Medication Sig Start Date End Date Taking? Authorizing Provider   PNV No12-Iron-FA-DSS-OM-3 29 mg iron-1 mg -50 mg CPKD Take  by mouth.    Yes Provider, Historical        Review of Systems: History obtained from the patient  Constitutional: negative for weight loss, fever, night sweats  HEENT: negative for hearing loss, earache, congestion, snoring, sore throat  CV: negative for chest pain, palpitations, edema  Resp: negative for cough, shortness of breath, wheezing  Breast: negative for breast lumps, nipple discharge, galactorrhea  GI: negative for change in bowel habits, abdominal pain, black or bloody stools  : negative for frequency, dysuria, hematuria, vaginal discharge  MSK: negative for back pain, joint pain, muscle pain  Skin: negative for itching, rash, hives  Neuro: negative for dizziness, headache, confusion, weakness  Psych: negative for anxiety, depression, change in mood  Heme/lymph: negative for bleeding, bruising, pallor    Objective:  Visit Vitals  Ht 5' 7\" (1.702 m)   LMP 03/06/2020 (Exact Date)   BMI 23.49 kg/m²       Physical Exam:     Constitutional  · Appearance: well-nourished, well developed, alert, in no acute distress    HENT  · Head  · Face: appears normal  · Eyes: appear normal  · Ears: normal  · Mouth: normal  · Lips: no lesions      Chest  · Respiratory Effort: breathing unlabored     Cardiovascular  · Heart:  · Auscultation: regular rate and rhythm without murmur      Gastrointestinal  · Abdominal Examination: abdomen non-tender to palpation, normal bowel sounds, no masses present  · Liver and spleen: no hepatomegaly present, spleen not palpable  · Hernias: no hernias identified    Genitourinary  · deferred    Skin  · General Inspection: no rash, no lesions identified    Neurologic/Psychiatric  · Mental Status:  · Orientation: grossly oriented to person, place and time  · Mood and Affect: mood normal, affect appropriate    Assessment:   Intrauterine pregnancy with issues addressed in problem list  Plan:   Patient declines presence of chaperone during today's visit. Offered CF testing, CVS, Nuchal Translucency, MSAFP, amnio, and discussed NIPT  Course of pregnancy discussed including visit schedule, routine U/S, glucola testing, etc.  Avoid alcoholic beverages and illicit/recreational drugs use  Take prenatal vitamins or folic acid daily. Hospital and practice style discussed with coverage system. Discussed nutrition, toxoplasmosis precautions, sexual activity, exercise, need for influenza vaccine, environmental and work hazards, travel advice, screen for domestic violence, need for seat belts. Discussed seafood, unpasteurized dairy products, deli meat, artificial sweeteners, and caffeine. Discussed current prescription drug use. Given medication list.  Discussed the use of over the counter medications and chemicals. Route of delivery discussed, including risks, benefits     Handouts given to pt.

## 2020-04-27 NOTE — PATIENT INSTRUCTIONS

## 2020-06-03 ENCOUNTER — ROUTINE PRENATAL (OUTPATIENT)
Dept: OBGYN CLINIC | Age: 35
End: 2020-06-03

## 2020-06-03 VITALS
WEIGHT: 158 LBS | BODY MASS INDEX: 24.8 KG/M2 | HEIGHT: 67 IN | DIASTOLIC BLOOD PRESSURE: 76 MMHG | SYSTOLIC BLOOD PRESSURE: 120 MMHG

## 2020-06-03 DIAGNOSIS — Z34.80 PRENATAL CARE OF MULTIGRAVIDA, ANTEPARTUM: ICD-10-CM

## 2020-06-03 DIAGNOSIS — O09.521 ENCOUNTER FOR SUPERVISION OF MULTIGRAVIDA OF ADVANCED MATERNAL AGE IN FIRST TRIMESTER: Primary | ICD-10-CM

## 2020-06-03 LAB
ANTIBODY SCREEN, EXTERNAL: NEGATIVE
CHLAMYDIA, EXTERNAL: NEGATIVE
HBSAG, EXTERNAL: NEGATIVE
HCT, EXTERNAL: 36.9
HGB EVAL, EXTERNAL: NEGATIVE
HGB, EXTERNAL: 12.6
HIV, EXTERNAL: NON REACTIVE
N. GONORRHEA, EXTERNAL: NEGATIVE
PLATELET CNT,   EXTERNAL: 192
RUBELLA, EXTERNAL: NORMAL
T. PALLIDUM, EXTERNAL: NON REACTIVE
TYPE, ABO & RH, EXTERNAL: NORMAL
URINALYSIS, EXTERNAL: NEGATIVE
VARICELLA, EXTERNAL: NORMAL

## 2020-06-03 RX ORDER — ENOXAPARIN SODIUM 100 MG/ML
40 INJECTION SUBCUTANEOUS DAILY
Qty: 90 SYRINGE | Refills: 2 | Status: SHIPPED | OUTPATIENT
Start: 2020-06-03 | End: 2020-12-04

## 2020-06-03 NOTE — PROGRESS NOTES
Doing well, no concerns. Tolerating lovenox without issue. 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

## 2020-06-03 NOTE — PATIENT INSTRUCTIONS
Weeks 10 to 14 of Your Pregnancy: Care Instructions Your Care Instructions By weeks 10 to 15 of your pregnancy, the placenta has formed inside your uterus. It is possible to hear your baby's heartbeat with a special ultrasound device. Your baby's eyes can and do move. The arms and legs can bend. This is a good time to think about testing for birth defects. There are two types of tests: screening and diagnostic. Screening tests show the chance that a baby has a certain birth defect. They can't tell you for sure that your baby has a problem. Diagnostic tests show if a baby has a certain birth defect. It's your choice whether to have these tests. You and your partner can talk to your doctor or midwife about birth defects tests. Follow-up care is a key part of your treatment and safety. Be sure to make and go to all appointments, and call your doctor if you are having problems. It's also a good idea to know your test results and keep a list of the medicines you take. How can you care for yourself at home? Decide about tests · You can have screening tests and diagnostic tests to check for birth defects. The decision to have a test for birth defects is personal. Think about your age, your chance of passing on a family disease, your need to know about any problems, and what you might do after you have the test results. ? Triple or quadruple (quad) blood tests. These screening tests can be done between 15 and 20 weeks of pregnancy. They check the amounts of three or four substances in your blood. The doctor looks at these test results, along with your age and other factors, to find out the chance that your baby may have certain problems. ? Amniocentesis. This diagnostic test is used to look for chromosomal problems in the baby's cells.  It can be done between 15 and 20 weeks of pregnancy, usually around week 16. 
? Nuchal translucency test. This test uses ultrasound to measure the thickness of the area at the back of the baby's neck. An increase in the thickness can be an early sign of Down syndrome. ? Chorionic villus sampling (CVS). This is a test that looks for certain genetic problems with your baby. The same genes that are in your baby are in the placenta. A small piece of the placenta is taken out and tested. This test is done when you are 10 to 13 weeks pregnant. Ease discomfort · Slow down and take naps when you feel tired. · If your emotions swing, talk to someone. Crying, anxiety, and concentration problems are common. · If your gums bleed, try a softer toothbrush. If your gums are puffy and bleed a lot, see your dentist. 
· If you feel dizzy: ? Get up slowly after sitting or lying down. ? Drink plenty of fluids. ? Eat small snacks to keep your blood sugar stable. ? Put your head between your legs as though you were tying your shoelaces. ? Lie down with your legs higher than your head. Use pillows to prop up your feet. · If you have a headache: ? Lie down. ? Ask your partner or a good friend for a neck massage. ? Try cool cloths over your forehead or across the back of your neck. ? Use acetaminophen (Tylenol) for pain relief. Do not use nonsteroidal anti-inflammatory drugs (NSAIDs), such as ibuprofen (Advil, Motrin) or naproxen (Aleve), unless your doctor says it is okay. · If you have a nosebleed, pinch your nose gently, and hold it for a short while. To prevent nosebleeds, try massaging a small dab of petroleum jelly, such as Vaseline, in your nostrils. · If your nose is stuffed up, try saline (saltwater) nose sprays. Do not use decongestant sprays. Care for your breasts · Wear a bra that gives you good support. · Know that changes in your breasts are normal. 
? Your breasts may get larger and more tender. Tenderness usually gets better by 12 weeks. ? Your nipples may get darker and larger, and small bumps around your nipples may show more. ? The veins in your chest and breasts may show more. · Don't worry about \"toughening'\" your nipples. Breastfeeding will naturally do this. Where can you learn more? Go to http://cassie-bree.info/ Enter A204 in the search box to learn more about \"Weeks 10 to 14 of Your Pregnancy: Care Instructions. \" Current as of: May 29, 2019Content Version: 12.4 © 5726-3982 [employer]wise, Incorporated. Care instructions adapted under license by [employer] (which disclaims liability or warranty for this information). If you have questions about a medical condition or this instruction, always ask your healthcare professional. [address] any warranty or liability for your use of this information.

## 2020-06-05 LAB
ABO GROUP BLD: NORMAL
BACTERIA UR CULT: NO GROWTH
BASOPHILS # BLD AUTO: 0 X10E3/UL (ref 0–0.2)
BASOPHILS NFR BLD AUTO: 0 %
BLD GP AB SCN SERPL QL: NEGATIVE
EOSINOPHIL # BLD AUTO: 0.1 X10E3/UL (ref 0–0.4)
EOSINOPHIL NFR BLD AUTO: 1 %
ERYTHROCYTE [DISTWIDTH] IN BLOOD BY AUTOMATED COUNT: 12 % (ref 11.7–15.4)
HBV SURFACE AG SERPL QL IA: NEGATIVE
HCT VFR BLD AUTO: 36.9 % (ref 34–46.6)
HGB A MFR BLD: 97.6 % (ref 96.4–98.8)
HGB A2 MFR BLD COLUMN CHROM: 2.4 % (ref 1.8–3.2)
HGB BLD-MCNC: 12.6 G/DL (ref 11.1–15.9)
HGB C MFR BLD: 0 %
HGB F MFR BLD: 0 % (ref 0–2)
HGB FRACT BLD-IMP: NORMAL
HGB OTHER MFR BLD HPLC: 0 %
HGB S BLD QL SOLY: NEGATIVE
HGB S MFR BLD: 0 %
HIV 1+2 AB+HIV1 P24 AG SERPL QL IA: NON REACTIVE
IMM GRANULOCYTES # BLD AUTO: 0 X10E3/UL (ref 0–0.1)
IMM GRANULOCYTES NFR BLD AUTO: 0 %
LYMPHOCYTES # BLD AUTO: 2.4 X10E3/UL (ref 0.7–3.1)
LYMPHOCYTES NFR BLD AUTO: 28 %
MCH RBC QN AUTO: 30.7 PG (ref 26.6–33)
MCHC RBC AUTO-ENTMCNC: 34.1 G/DL (ref 31.5–35.7)
MCV RBC AUTO: 90 FL (ref 79–97)
MONOCYTES # BLD AUTO: 0.7 X10E3/UL (ref 0.1–0.9)
MONOCYTES NFR BLD AUTO: 8 %
NEUTROPHILS # BLD AUTO: 5.4 X10E3/UL (ref 1.4–7)
NEUTROPHILS NFR BLD AUTO: 63 %
PLATELET # BLD AUTO: 192 X10E3/UL (ref 150–450)
RBC # BLD AUTO: 4.11 X10E6/UL (ref 3.77–5.28)
RH BLD: POSITIVE
RUBV IGG SERPL IA-ACNC: 8.07 INDEX
TREPONEMA PALLIDUM IGG+IGM AB [PRESENCE] IN SERUM OR PLASMA BY IMMUNOASSAY: NON REACTIVE
VZV IGG SER IA-ACNC: 1856 INDEX
WBC # BLD AUTO: 8.7 X10E3/UL (ref 3.4–10.8)

## 2020-06-08 NOTE — PROGRESS NOTES
I've reviewed your results and all looks fine Pharmacist Admission Medication Reconciliation Pending Note    Prior to Admission Medications were reviewed by the pharmacist and pended for provider review during admission medication reconciliation.    Medications were pended by the pharmacist at this time as follows:    Pended Admission Order Reconciliation Actions     Order Name Action Reordered As    amLODIPine (NORVASC) 5 MG tablet Order for Admission amLODIPine (NORVASC) tablet 5 mg    atorvastatin (LIPITOR) 80 MG tablet Order for Admission atorvastatin (LIPITOR) tablet 80 mg    fluticasone-vilanterol (BREO ELLIPTA) 100-25 MCG/INH inhaler Order for Admission fluticasone-vilanterol (BREO ELLIPTA) 100-25 MCG/INH inhaler 1 puff    brimonidine (ALPHAGAN) 0.2 % ophthalmic solution Order for Admission brimonidine (ALPHAGAN) 0.2 % ophthalmic solution 1 drop    carvedilol (COREG) 6.25 MG tablet Order for Admission carvedilol (COREG) tablet 6.25 mg    insulin NPH (HUMULIN N KWIKPEN) 100 UNIT/ML pen-injector Order for Admission insulin NPH (HumuLIN N, NovoLIN N) injection 16 Units    ranitidine (ZANTAC) 150 MG tablet Order for Admission famotidine (PEPCID) tablet 20 mg    sevelamer carbonate (RENVELA) 800 MG tablet Order for Admission sevelamer carbonate (RENVELA) tablet 800 mg    tamsulosin (FLOMAX) 0.4 MG Cap Order for Admission tamsulosin (FLOMAX) capsule 0.4 mg    oseltamivir (TAMIFLU) 75 MG capsule Do Not Order for Admission             Orders Pended To Continue For Hospital Stay     ID Description Pended By When Reason    [id number] amLODIPine (NORVASC) tablet 5 mg-DAILY Vinita Echavarria, [facility] 01/23/18 1711     [id number] atorvastatin (LIPITOR) tablet 80 mg-AT BEDTIME Vinita Echavarria, [facility] 01/23/18 1711     [id number] brimonidine (ALPHAGAN) 0.2 % ophthalmic solution 1 drop-EVERY 12 HOURS SCHEDULED Vinita Echavarria [facility] 01/23/18 1711     [id number] carvedilol (COREG) tablet 6.25 mg-EVERY 12 HOURS SCHEDULED Vinita Echavarria [facility] 01/23/18 1711     [id number]  fluticasone-vilanterol (BREO ELLIPTA) 100-25 MCG/INH inhaler 1 puff-DAILY RESPIRATORY Vinita Echavarria, [facility] 01/23/18 1711     [id number] tamsulosin (FLOMAX) capsule 0.4 mg-DAILY Vinita Echavarria, [facility] 01/23/18 1711     [id number] insulin NPH (HumuLIN N, NovoLIN N) injection 16 Units-DAILY Vinita Echavarria [facility] 01/23/18 1711     [id number] famotidine (PEPCID) tablet 20 mg-DAILY Vinita Echavarria [facility] 01/23/18 1711     [id number] sevelamer carbonate (RENVELA) tablet 800 mg-3 TIMES DAILY WITH MEALS Vinita Echavarria [facility] 01/23/18 1711             Pharmacist Notations:     Medications left unaddressed or discontinued:  Insulin aspart- would recommend using sliding scale insulin order set  Oseltamivir- therapy completed    Last edited by Vinita Echavarria [facility] on 01/23/18 at 1711          Orders that are ultimately reconciled and signed during admission medication reconciliation may differ from the pended actions above.    Please contact the pharmacist for questions.    Vinita Echavarria RPH  1/23/2018 5:11 PM

## 2020-06-10 DIAGNOSIS — Z34.80 PRENATAL CARE OF MULTIGRAVIDA, ANTEPARTUM: ICD-10-CM

## 2020-06-10 LAB
C TRACH RRNA SPEC QL NAA+PROBE: NEGATIVE
N GONORRHOEA RRNA SPEC QL NAA+PROBE: NEGATIVE

## 2020-06-25 ENCOUNTER — ROUTINE PRENATAL (OUTPATIENT)
Dept: OBGYN CLINIC | Age: 35
End: 2020-06-25

## 2020-06-25 ENCOUNTER — VIRTUAL VISIT (OUTPATIENT)
Dept: OBGYN CLINIC | Age: 35
End: 2020-06-25

## 2020-06-25 VITALS — BODY MASS INDEX: 25.27 KG/M2 | HEIGHT: 67 IN | WEIGHT: 161 LBS

## 2020-06-25 DIAGNOSIS — Z34.80 SUPERVISION OF OTHER NORMAL PREGNANCY: Primary | ICD-10-CM

## 2020-06-25 DIAGNOSIS — Z34.80 PRENATAL CARE OF MULTIGRAVIDA, ANTEPARTUM: Primary | ICD-10-CM

## 2020-06-25 NOTE — PATIENT INSTRUCTIONS

## 2020-06-25 NOTE — PROGRESS NOTES
Geni Video visit  Melissa Vyas is a 38 y.o. female who was seen by synchronous (real-time) audio-video technology on 6/25/2020. Please see OB flowsheet for documentation. We discussed the expected course, resolution and complications of the diagnosis(es) in detail. Medication risks, benefits, costs, interactions, and alternatives were discussed as indicated. I advised her to contact the office if her condition worsens, changes or fails to improve as anticipated. She expressed understanding with the diagnosis(es) and plan. Pursuant to the emergency declaration under the [facility], [facility] waiver authority and the [employer] Act, this Virtual  Visit was conducted, with patient's consent, to reduce the patient's risk of exposure to COVID-19 and provide continuity of care for an established patient. Services were provided through a video synchronous discussion virtually to substitute for in-person clinic visit.

## 2020-07-21 ENCOUNTER — HOSPITAL ENCOUNTER (OUTPATIENT)
Dept: PERINATAL CARE | Age: 35
Discharge: HOME | End: 2020-07-21
Attending: OBSTETRICS & GYNECOLOGY
Payer: COMMERCIAL

## 2020-07-21 PROCEDURE — 76811 OB US DETAILED SNGL FETUS: CPT | Performed by: OBSTETRICS & GYNECOLOGY

## 2020-07-24 ENCOUNTER — ROUTINE PRENATAL (OUTPATIENT)
Dept: OBGYN CLINIC | Age: 35
End: 2020-07-24

## 2020-07-24 VITALS — WEIGHT: 170 LBS | SYSTOLIC BLOOD PRESSURE: 122 MMHG | BODY MASS INDEX: 26.63 KG/M2 | DIASTOLIC BLOOD PRESSURE: 80 MMHG

## 2020-07-24 DIAGNOSIS — Z34.80 SUPERVISION OF OTHER NORMAL PREGNANCY: Primary | ICD-10-CM

## 2020-07-24 NOTE — PROGRESS NOTES
A 1 Doing well, had MFM fetal scan 7/21 w/ normal findings  Gender a secret  Feeling flutters most days now  No pregnancy concerns

## 2020-07-24 NOTE — PATIENT INSTRUCTIONS

## 2020-09-21 ENCOUNTER — ROUTINE PRENATAL (OUTPATIENT)
Dept: OBGYN CLINIC | Age: 35
End: 2020-09-21
Payer: COMMERCIAL

## 2020-09-21 VITALS — BODY MASS INDEX: 29.29 KG/M2 | WEIGHT: 187 LBS | SYSTOLIC BLOOD PRESSURE: 114 MMHG | DIASTOLIC BLOOD PRESSURE: 72 MMHG

## 2020-09-21 DIAGNOSIS — Z34.80 PRENATAL CARE OF MULTIGRAVIDA, ANTEPARTUM: ICD-10-CM

## 2020-09-21 DIAGNOSIS — Z36.9 UNSPECIFIED ANTENATAL SCREENING: ICD-10-CM

## 2020-09-21 DIAGNOSIS — Z23 ENCOUNTER FOR IMMUNIZATION: Primary | ICD-10-CM

## 2020-09-21 LAB
ANTIBODY SCREEN, EXTERNAL: NEGATIVE
GTT, 1 HR, GLUCOLA, EXTERNAL: 141
HCT, EXTERNAL: 33.9
HGB, EXTERNAL: 11.6
HIV, EXTERNAL: NON REACTIVE
PLATELET CNT,   EXTERNAL: 175
T. PALLIDUM, EXTERNAL: NON REACTIVE

## 2020-09-21 PROCEDURE — 90715 TDAP VACCINE 7 YRS/> IM: CPT

## 2020-09-21 PROCEDURE — 90472 IMMUNIZATION ADMIN EACH ADD: CPT

## 2020-09-21 PROCEDURE — 90471 IMMUNIZATION ADMIN: CPT

## 2020-09-21 PROCEDURE — 0502F SUBSEQUENT PRENATAL CARE: CPT

## 2020-09-21 PROCEDURE — 90686 IIV4 VACC NO PRSV 0.5 ML IM: CPT

## 2020-09-21 NOTE — PROGRESS NOTES
Third trimester lab/tdap/flu today. Due to hx precip birth and being on heparin, INDXN 12/7 posted  Low back pain. Positive FM. Patient in office for Tdap injection, office supplied. After obtaining consent, and per orders of MD, injection of Tdap given by Perla Aguilar LPN. Patient instructed to remain in clinic for 20 minutes afterwards, and to report any adverse reaction to me immediately. Lot [id number]  Exp 8/30/22  Dose 0.5 ml  Site left deltoid  Route IM   [facility] [id number]    Patient in office for Flu injection, office supplied. After obtaining consent, and per orders of MD, injection of Flu vaccine given by Perla Aguilar LPN. Patient instructed to remain in clinic for 20 minutes afterwards, and to report any adverse reaction to me immediately.     Lot [id number]  Exp 6/30/21  Dose 0.5 ml  Site right deltoid  Route IM   [facility] [id number]

## 2020-09-21 NOTE — PATIENT INSTRUCTIONS

## 2020-09-23 LAB
BASOPHILS # BLD AUTO: 0 X10E3/UL (ref 0–0.2)
BASOPHILS NFR BLD AUTO: 0 %
BLD GP AB SCN SERPL QL: NEGATIVE
EOSINOPHIL # BLD AUTO: 0.1 X10E3/UL (ref 0–0.4)
EOSINOPHIL NFR BLD AUTO: 1 %
ERYTHROCYTE [DISTWIDTH] IN BLOOD BY AUTOMATED COUNT: 12.1 % (ref 11.7–15.4)
GLUCOSE 1H P 50 G GLC PO SERPL-MCNC: 141 MG/DL (ref 65–129)
HCT VFR BLD AUTO: 33.9 % (ref 34–46.6)
HGB BLD-MCNC: 11.6 G/DL (ref 11.1–15.9)
HIV 1+2 AB+HIV1 P24 AG SERPL QL IA: NON REACTIVE
IMM GRANULOCYTES # BLD AUTO: 0.1 X10E3/UL (ref 0–0.1)
IMM GRANULOCYTES NFR BLD AUTO: 1 %
LYMPHOCYTES # BLD AUTO: 1.9 X10E3/UL (ref 0.7–3.1)
LYMPHOCYTES NFR BLD AUTO: 18 %
MCH RBC QN AUTO: 31 PG (ref 26.6–33)
MCHC RBC AUTO-ENTMCNC: 34.2 G/DL (ref 31.5–35.7)
MCV RBC AUTO: 91 FL (ref 79–97)
MONOCYTES # BLD AUTO: 1 X10E3/UL (ref 0.1–0.9)
MONOCYTES NFR BLD AUTO: 9 %
NEUTROPHILS # BLD AUTO: 7.6 X10E3/UL (ref 1.4–7)
NEUTROPHILS NFR BLD AUTO: 71 %
PLATELET # BLD AUTO: 175 X10E3/UL (ref 150–450)
RBC # BLD AUTO: 3.74 X10E6/UL (ref 3.77–5.28)
TREPONEMA PALLIDUM IGG+IGM AB [PRESENCE] IN SERUM OR PLASMA BY IMMUNOASSAY: NON REACTIVE
WBC # BLD AUTO: 10.7 X10E3/UL (ref 3.4–10.8)

## 2020-09-25 NOTE — PROGRESS NOTES
Nicole Dennisy wonderful to have checked in and to have gotten the comprehensive sugar test scheduled. Saw this result yesterday but didn't have the heart to call you last night. Have a super weekend. I'm expecting normal results.

## 2020-09-29 DIAGNOSIS — Z34.80 PRENATAL CARE OF MULTIGRAVIDA, ANTEPARTUM: ICD-10-CM

## 2020-09-30 ENCOUNTER — LAB ONLY (OUTPATIENT)
Dept: OBGYN CLINIC | Age: 35
End: 2020-09-30

## 2020-09-30 DIAGNOSIS — R73.09 ELEVATED GLUCOSE TOLERANCE TEST: Primary | ICD-10-CM

## 2020-09-30 DIAGNOSIS — M53.3 SI (SACROILIAC) PAIN: ICD-10-CM

## 2020-09-30 LAB
GTT 120 MIN, EXTERNAL: 116
GTT 180 MIN, EXTERNAL: 37
GTT 60 MIN, EXTERNAL: 191
GTT, FASTING, EXTERNAL: 80

## 2020-10-01 NOTE — PROGRESS NOTES
Great news-  These results are considered normal; your glucose control is excellent when not challenged with excessive carbs  ;)  Just stay active and on a healthy diet

## 2020-10-02 LAB
GLUCOSE 1H P 100 G GLC PO SERPL-MCNC: 191 MG/DL (ref 65–179)
GLUCOSE 2H P 100 G GLC PO SERPL-MCNC: 116 MG/DL (ref 65–154)
GLUCOSE 3H P 100 G GLC PO SERPL-MCNC: 37 MG/DL (ref 65–139)
GLUCOSE P FAST SERPL-MCNC: 80 MG/DL (ref 65–94)
NOTE:, 102047: ABNORMAL

## 2020-10-02 NOTE — PROGRESS NOTES
Attempted to abstract labs, did not save appropriately; PL already up to date, patient aware of results

## 2020-10-07 ENCOUNTER — HOSPITAL ENCOUNTER (OUTPATIENT)
Dept: PHYSICAL THERAPY | Age: 35
End: 2020-10-07

## 2020-10-08 ENCOUNTER — ROUTINE PRENATAL (OUTPATIENT)
Dept: OBGYN CLINIC | Age: 35
End: 2020-10-08
Payer: COMMERCIAL

## 2020-10-08 VITALS — BODY MASS INDEX: 29.38 KG/M2 | SYSTOLIC BLOOD PRESSURE: 118 MMHG | DIASTOLIC BLOOD PRESSURE: 78 MMHG | WEIGHT: 187.6 LBS

## 2020-10-08 DIAGNOSIS — Z34.80 SUPERVISION OF OTHER NORMAL PREGNANCY: Primary | ICD-10-CM

## 2020-10-08 DIAGNOSIS — Z34.80 PRENATAL CARE OF MULTIGRAVIDA, ANTEPARTUM: ICD-10-CM

## 2020-10-08 PROCEDURE — 0502F SUBSEQUENT PRENATAL CARE: CPT | Performed by: OBSTETRICS & GYNECOLOGY

## 2020-10-08 RX ORDER — PHENOL/SODIUM PHENOLATE
20 AEROSOL, SPRAY (ML) MUCOUS MEMBRANE DAILY
COMMUNITY
End: 2021-01-21 | Stop reason: ALTCHOICE

## 2020-10-14 ENCOUNTER — HOSPITAL ENCOUNTER (OUTPATIENT)
Dept: PHYSICAL THERAPY | Age: 35
Discharge: HOME | End: 2020-10-14
Payer: COMMERCIAL

## 2020-10-14 PROCEDURE — 97162 PT EVAL MOD COMPLEX 30 MIN: CPT | Performed by: PHYSICAL MEDICINE & REHABILITATION

## 2020-10-14 PROCEDURE — 97530 THERAPEUTIC ACTIVITIES: CPT | Performed by: PHYSICAL MEDICINE & REHABILITATION

## 2020-10-14 PROCEDURE — 97110 THERAPEUTIC EXERCISES: CPT | Performed by: PHYSICAL MEDICINE & REHABILITATION

## 2020-10-14 PROCEDURE — 97140 MANUAL THERAPY 1/> REGIONS: CPT | Performed by: PHYSICAL MEDICINE & REHABILITATION

## 2020-10-14 NOTE — PROGRESS NOTES
PT INITIAL EVALUATION NOTE 2-15    Patient Name: Matti Torres  Date:10/14/2020  : 1985  [x]  Patient  Verified  Payor: Michael Seda Snidere S / Plan: [facility] [employer] [address] / Product Type: Commerical /    In time: 5561  Out time: 9830  Total Treatment Time (min): 60  Visit #: 1    Treatment Area: Bilateral hip pain [M25.551, M25.552]    SUBJECTIVE  Pain Level (0-10 scale): Any medication changes, allergies to medications, adverse drug reactions, diagnosis change, or new procedure performed?: [] No    [x] Yes (see summary sheet for update)  Subjective:       Patient is a 38year old female  31 weeks pregnant with complaints of bilateral hip pain, sacral pain, low back pain that started 20 after attempting an exercise video. Pain a 2/10 currently, increases toward the end of the day to the point where she is limping. She denies LE pain and parasthesias. She has difficulty with transitions, bed mobility, car transfers, stairs. She has been using a SEROLA belt with some relief. She had R SIJ pain with previous pregnancy that resolved on its own. She has a previous history of SIJ pain/dysfunction, has responded well to PT efforts in the past.  Induction date is set for 20. PLOF:  No LBP   Mechanism of Injury: None  Previous Treatment/Compliance: Good  Work Hx:  SAHM  Living Situation: spouse, 2 young children  Pt Goals: no LBP with ADLs  Barriers: none  Motivation: good  Substance use: none   FABQ Score: FOTO  Cognition: A & O x 4      OBJECTIVE/EXAMINATION    Posture: Increased lumbar lordosis, forward head, forward rounded shoulders, decreased abdominal tone   Other Observations:  R LE long in supine  Gait and Functional Mobility:  Slow to transition, slow wide based gait. Palpation: Tender along B SIJ, lumbar paraspinals, B piriformis with increased ms turgor noted.    Joint Mobility:  Pt L innominate in supine, elevated L pub rami in supine        Lumbar AROM:        R  L  Flexion    Fingertips to patella           Extension   50%          Side Bending   50%        Rotation   50%            Aberrant movements:  Painful arc: [] Yes  [x] No  Instability catch: [] Yes  [x] No  Difficulty returning from flexion: [x] Yes  [] No  Reversal of lumbopelvic rhythm: [x] Yes  [] No      LOWER QUARTER   MUSCLE STRENGTH  KEY       R  L  0 - No Contraction  L1, L2 Psoas  4  4     1 - Trace   L3 Quads  5  5     2 - Poor   L4 Tib Ant  5  5      3 - Fair    L5 EHL  5  5      4 - Good   S1 FHL  5  5      5 - Normal   S2 Hams  5  5              MMT:  Core strength: 3/5    Hip abduction:  4/5   Hip extension: 4/5    Neurological: Sensation:  Denies parasthesias or focal weakness, :LE DTR's brisk and equal bilaterally     Special Tests:        Slump: NT   Kirby: Tight hip flexors observed        Benjamín: Tight ITB observed    SLR: 70 bilaterally, tight HS   TAMMI:  WNL       Long Sit: NT    SI compression/ Distraction: Decreased pain with compression   Active SLR: R SIJ pain with active SLR to 30 degrees              15 min Therapeutic Exercise:  [x] See flow sheet :     Rationale: increase ROM and increase strength to improve the patients ability to stand and walk with decreased pain     15  min Therapeutic Activity:  []  See flow sheet :  SEROLA belt fitting. Don/Doff instructions. Demonstrated and practiced body mechanics for LS protaction with  transitional ADLs including bed mobility, car transfers, lifting, reaching. Rationale: improve coordination and increase proprioception  to improve the patients ability to perform IADLs with decreased pain      15 min Manual Therapy:  MET L post innominate, pub rami correction. Practiced self correction technique for home. STM lumbar paraspinals    Rationale: decrease pain, increase ROM, increase tissue extensibility and decrease trigger points  to improve the patients ability to perform IADLs with no pain.        With   [x] TE   [x] TA   [] neuro   [] other: Patient Education: [x] Review HEP    [x] Progressed/Changed HEP based on:   [x] positioning   [] body mechanics   [] transfers   [] heat/ice application    [] other:        Other Objective/Functional Measures:  Pt demonstrated good understanding of all concepts and exercises discussed today. Pain Level (0-10 scale) post treatment:  1      ASSESSMENT:   Pt 31 weeks pregnant referred to PT for evaluation and treatment of lumbar and pelvic pain presents with lumbosacral dysfunction, posture changes, decreased core strength in pregnancy. She will benefit from skilled PT services to address her limitations and achieve her functional goals as stated on the plan of care.       [x]  See Plan of Ki Sanchez, PT, MSPT  10/14/2020

## 2020-10-21 ENCOUNTER — HOSPITAL ENCOUNTER (OUTPATIENT)
Dept: PHYSICAL THERAPY | Age: 35
Discharge: HOME | End: 2020-10-21
Payer: COMMERCIAL

## 2020-10-21 PROCEDURE — 97140 MANUAL THERAPY 1/> REGIONS: CPT | Performed by: PHYSICAL MEDICINE & REHABILITATION

## 2020-10-21 PROCEDURE — 97110 THERAPEUTIC EXERCISES: CPT | Performed by: PHYSICAL MEDICINE & REHABILITATION

## 2020-10-21 NOTE — PROGRESS NOTES
PT DAILY TREATMENT NOTE 2-15    Patient Name: Annabel Trent  Date:10/21/2020  : 1985  [x]  Patient  Verified  Payor: [address] / Plan: [facility] [address] / Product Type: Commerical /    In time: 023   Out time: 0300  Total Treatment Time (min): 60  Visit #:  2    Treatment Area: Bilateral hip pain [M25.551, M25.552]    SUBJECTIVE  Pain Level (0-10 scale): 2  Any medication changes, allergies to medications, adverse drug reactions, diagnosis change, or new procedure performed?: [x] No    [] Yes (see summary sheet for update)  Subjective functional status/changes:   [] No changes reported  Felt much better after last visit, manual therapy seems to have helped    OBJECTIVE      30 min Therapeutic Exercise:  [] See flow sheet :    Access Code: Александрbk Hodges   URL: [email or web address]   Date: 10/21/2020   Prepared by: Oralia Mae     Exercises   Seated Hip Adduction Squeeze with Ravin Steve - 10 reps - 3 sets - 1x daily - 7x weekly   Seated Hip Abduction with Resistance - 10 reps - 3 sets - 1x daily - 7x weekly   Supine Posterior Pelvic Tilt - 10 reps - 3 sets - 1x daily - 7x weekly      Rationale: increase ROM, increase strength and improve coordination to improve the patients ability to perform IADLs with no pain     30 min Manual Therapy:  STM lumbar paraspinals, sacral pressure, TPR to B piriformis, Central PA mobs T6-L4 Grade 3    Instruct perineal stretch to prepare for delivery.      MET pub rami correction, L post innominate correction    Self correct instructions and practice    Rationale: decrease pain, increase ROM and increase tissue extensibility  to improve the patients ability to perform IADLs with no pain             With   [x] TE   [] TA   [] neuro   [] other: Patient Education: [x] Review HEP    [x] Progressed/Changed HEP based on:   [x] positioning   [x] body mechanics   [] transfers   [] heat/ice application    [] other:      Other Objective/Functional Measures:  --     Pain Level (0-10 scale) post treatment: 0    ASSESSMENT/Changes in Function:     Patient will continue to benefit from skilled PT services to modify and progress therapeutic interventions to attain remaining goals. [x]  See Plan of Care  []  See progress note/recertification  []  See Discharge Summary         Progress towards goals / Updated goals:  Able to advance exercises today with good tolerance. Pt continues to need instruction for correct exercise form and performance. Continues to demonstrate good potential to achieve functional goals stated on the plan of care.       PLAN  [x]  Upgrade activities as tolerated     []  Continue plan of care  []  Update interventions per flow sheet       []  Discharge due to:_  []  Other:_      Rochester Severin, PT, MSPT    10/21/2020

## 2020-10-22 ENCOUNTER — ROUTINE PRENATAL (OUTPATIENT)
Dept: OBGYN CLINIC | Age: 35
End: 2020-10-22
Payer: COMMERCIAL

## 2020-10-22 VITALS
DIASTOLIC BLOOD PRESSURE: 70 MMHG | HEIGHT: 67 IN | WEIGHT: 188 LBS | BODY MASS INDEX: 29.51 KG/M2 | SYSTOLIC BLOOD PRESSURE: 110 MMHG

## 2020-10-22 DIAGNOSIS — Z34.83 PRENATAL CARE, SUBSEQUENT PREGNANCY IN THIRD TRIMESTER: Primary | ICD-10-CM

## 2020-10-22 PROCEDURE — 0502F SUBSEQUENT PRENATAL CARE: CPT | Performed by: OBSTETRICS & GYNECOLOGY

## 2020-10-22 NOTE — PATIENT INSTRUCTIONS
Weeks 32 to 34 of Your Pregnancy: Care Instructions Your Care Instructions During the last few weeks of your pregnancy, you may have more aches and pains. It's important to rest when you can. Your growing baby is putting more pressure on your bladder. So you may need to urinate more often. Hemorrhoids are also common. These are painful, itchy veins in the rectal area. In the 36th week, most women have a test for group B streptococcus (GBS). GBS is a common bacteria that can live in the vagina and rectum. It can make your baby sick after birth. If you test positive, you will get antibiotics during labor. These will keep your baby from getting the bacteria. You may want to talk with your doctor about banking your baby's umbilical cord blood. This is the blood left in the cord after birth. If you want to save this blood, you must arrange it ahead of time. You can't decide at the last minute. If you haven't already had the Tdap shot during this pregnancy, talk to your doctor about getting it. It will help protect your  against pertussis infection. Follow-up care is a key part of your treatment and safety. Be sure to make and go to all appointments, and call your doctor if you are having problems. It's also a good idea to know your test results and keep a list of the medicines you take. How can you care for yourself at home? Ease hemorrhoids · Get more liquids, fruits, vegetables, and fiber in your diet. This will help keep your stools soft. · Avoid sitting for too long. Lie on your left side several times a day. · Clean yourself with soft, moist toilet paper. Or you can use witch hazel pads or personal hygiene pads. · If you are uncomfortable, try ice packs. Or you can sit in a warm sitz bath. Do these for 20 minutes at a time, as needed. · Use hydrocortisone cream for pain and itching. Two examples are Anusol and Preparation H Hydrocortisone. · Ask your doctor about taking an over-the-counter stool softener. Consider breastfeeding · Experts recommend that women breastfeed for 1 year or longer. · Breast milk may help protect your child from some health problems.  babies are less likely than formula-fed babies to: 
? Get ear infections, colds, diarrhea, and pneumonia. ? Be obese or get diabetes later in life. · Women who breastfeed have less bleeding after the birth. Their uteruses also shrink back faster. · Some women who breastfeed lose weight faster. Making milk burns calories. · Breastfeeding can lower your risk of breast cancer, ovarian cancer, and osteoporosis. Decide about circumcision for boys · As you make this decision, it may help to think about your personal, Caodaism, and family traditions. You get to decide if you will keep your son's penis natural or if he will be circumcised. · If you decide that you would like to have your baby circumcised, talk with your doctor. You can share your concerns about pain. And you can discuss your preferences for anesthesia. Where can you learn more? Go to http://www.gray.com/ Enter N934 in the search box to learn more about \"Weeks 32 to 34 of Your Pregnancy: Care Instructions. \" Current as of: February 11, 2020               Content Version: 12.6 © 1263-8291 [employer]. Care instructions adapted under license by [employer] (which disclaims liability or warranty for this information). If you have questions about a medical condition or this instruction, always ask your healthcare professional. [place] [address] any warranty or liability for your use of this information.

## 2020-10-26 RX ORDER — HEPARIN SODIUM 5000 [USP'U]/ML
1 INJECTION, SOLUTION INTRAVENOUS; SUBCUTANEOUS EVERY 12 HOURS
Qty: 60 SYRINGE | Refills: 7 | Status: SHIPPED | OUTPATIENT
Start: 2020-10-26 | End: 2020-11-24

## 2020-10-28 ENCOUNTER — HOSPITAL ENCOUNTER (OUTPATIENT)
Dept: PHYSICAL THERAPY | Age: 35
Discharge: HOME | End: 2020-10-28
Payer: COMMERCIAL

## 2020-10-28 PROCEDURE — 97140 MANUAL THERAPY 1/> REGIONS: CPT | Performed by: PHYSICAL MEDICINE & REHABILITATION

## 2020-10-28 PROCEDURE — 97530 THERAPEUTIC ACTIVITIES: CPT | Performed by: PHYSICAL MEDICINE & REHABILITATION

## 2020-10-28 NOTE — PROGRESS NOTES
[address] [facility] [address]  Phone: [phone]  Fax: [phone]    Plan of Care/ Statement of Necessity for Physical Therapy Services 2-15    Patient name: Geoff Bryson  : 1985  Provider#: [id number]  Referral source: Patricio Rojas MD      Medical/Treatment Diagnosis: Bilateral hip pain [M25.551, M25.552]     Prior Hospitalization: see medical history     Comorbidities: 31w pregnant,  Prior Level of Function: no SIJ pain  Medications: Verified on Patient Summary List    Start of Care: 10/14/20      Onset Date: 6 weeks       The Plan of Care and following information is based on the information from the initial evaluation. Assessment/ key information:  Pt 31 weeks pregnant referred to PT for evaluation and treatment of lumbar and pelvic pain presents with lumbosacral dysfunction, posture changes, decreased core strength in pregnancy. She will benefit from skilled PT services to address her limitations and achieve her functional goals as stated on the plan of care    Evaluation Complexity History MEDIUM  Complexity : 1-2 comorbidities / personal factors will impact the outcome/ POC ; Examination MEDIUM Complexity : 3 Standardized tests and measures addressing body structure, function, activity limitation and / or participation in recreation  ;Presentation MEDIUM Complexity : Evolving with changing characteristics  ; Clinical Decision Making MEDIUM Complexity : FOTO score of 26-74  Overall Complexity Rating: MEDIUM    Problem List: pain affecting function, decrease ROM, decrease strength, impaired gait/ balance, decrease ADL/ functional abilitiies, decrease activity tolerance, decrease flexibility/ joint mobility and decrease transfer abilities   Treatment Plan may include any combination of the following: Therapeutic exercise, Therapeutic activities, Neuromuscular re-education, Physical agent/modality, Gait/balance training, Manual therapy, Patient education, Self Care training and Functional mobility training  Patient / Family readiness to learn indicated by: asking questions  Persons(s) to be included in education: patient (P)  Barriers to Learning/Limitations: None  Patient Goal (s): no SIJ pain with ADLs  Patient Self Reported Health Status: good  Rehabilitation Potential: good    Short Term Goals: To be accomplished in 4 weeks:  Pt will be independent with a progressive HEP  Pt will demonstrate good posture with sitting, standing, transitional ADLs  Pt will have no pain with palpation through SIJ   Pt will be independent with use of SEROLA belt with ADLs  Pt will tolerate standing to cook 20 mins with no increased pain    Long Term Goals: To be accomplished in 8 weeks:  Pt will demonstrate full functional ROM with no increased pain as expected in pregnancy  Pt will have no pain with bed mobility tranfers   Pt will have no pain with stair climbing. Frequency / Duration: Patient to be seen 1 times per week for 4-8 weeks. Patient/ Caregiver education and instruction: self care and exercises    [x]  Plan of care has been reviewed with PTA Rochester Severin, PT, MSPT     10/28/2020     ________________________________________________________________________    I certify that the above Therapy Services are being furnished while the patient is under my care. I agree with the treatment plan and certify that this therapy is necessary.     [de-identified] Signature:____________________  Date:____________Time: _________

## 2020-10-28 NOTE — PROGRESS NOTES
PT DAILY TREATMENT NOTE 2-15    Patient Name: Geoff Bryson  Date:10/28/2020  : 1985  [x]  Patient  Verified  Payor: [address] / Plan: [facility] MED[facility] [address] / Product Type: Commerical /    In time:    Out time: 400  Total Treatment Time (min): 60  Visit #:  3    Treatment Area: Bilateral hip pain [M25.551, M25.552]    SUBJECTIVE  Pain Level (0-10 scale): 2  Any medication changes, allergies to medications, adverse drug reactions, diagnosis change, or new procedure performed?: [x] No    [] Yes (see summary sheet for update)  Subjective functional status/changes:   [] No changes reported  Felt much better after last visit, manual therapy seems to have helped    OBJECTIVE      30 min Therapeutic Exercise:  [] See flow sheet :    Access Code: Jessica Wing   URL: [email or web address]   Date: 10/21/2020   Prepared by: Damien Judd     Exercises   Seated Hip Adduction Squeeze with Jammie Carmel - 10 reps - 3 sets - 1x daily - 7x weekly   Seated Hip Abduction with Resistance - 10 reps - 3 sets - 1x daily - 7x weekly   Supine Posterior Pelvic Tilt - 10 reps - 3 sets - 1x daily - 7x weekly      Rationale: increase ROM, increase strength and improve coordination to improve the patients ability to perform IADLs with no pain     30 min Manual Therapy:  STM lumbar paraspinals, sacral pressure, TPR to B piriformis, Central PA mobs T6-L4 Grade 3    Instruct perineal stretch to prepare for delivery.      MET pub rami correction, L post innominate correction    Self correct instructions and practice    Rationale: decrease pain, increase ROM and increase tissue extensibility  to improve the patients ability to perform IADLs with no pain             With   [x] TE   [] TA   [] neuro   [] other: Patient Education: [x] Review HEP    [x] Progressed/Changed HEP based on:   [x] positioning   [x] body mechanics   [] transfers   [] heat/ice application    [] other:      Other Objective/Functional Measures:  --     Pain Level (0-10 scale) post treatment: 0    ASSESSMENT/Changes in Function:     Patient will continue to benefit from skilled PT services to modify and progress therapeutic interventions to attain remaining goals. [x]  See Plan of Care  []  See progress note/recertification  []  See Discharge Summary         Progress towards goals / Updated goals:  Able to advance exercises today with good tolerance. Pt continues to need instruction for correct exercise form and performance. Continues to demonstrate good potential to achieve functional goals stated on the plan of care.       PLAN  [x]  Upgrade activities as tolerated     []  Continue plan of care  []  Update interventions per flow sheet       []  Discharge due to:_  []  Other:_      Zayra Costello, PT, MSPT    10/28/2020

## 2020-11-04 ENCOUNTER — APPOINTMENT (OUTPATIENT)
Dept: PHYSICAL THERAPY | Age: 35
End: 2020-11-04

## 2020-11-05 ENCOUNTER — ROUTINE PRENATAL (OUTPATIENT)
Dept: OBGYN CLINIC | Age: 35
End: 2020-11-05
Payer: COMMERCIAL

## 2020-11-05 VITALS
BODY MASS INDEX: 30.37 KG/M2 | HEIGHT: 67 IN | WEIGHT: 193.5 LBS | SYSTOLIC BLOOD PRESSURE: 106 MMHG | DIASTOLIC BLOOD PRESSURE: 66 MMHG

## 2020-11-05 DIAGNOSIS — Z34.83 PRENATAL CARE, SUBSEQUENT PREGNANCY IN THIRD TRIMESTER: Primary | ICD-10-CM

## 2020-11-05 PROCEDURE — 0502F SUBSEQUENT PRENATAL CARE: CPT | Performed by: OBSTETRICS & GYNECOLOGY

## 2020-11-05 NOTE — PATIENT INSTRUCTIONS
Week 37 of Your Pregnancy: Care Instructions Your Care Instructions You are near the end of your pregnancyand you're probably pretty uncomfortable. It may be harder to walk around. Lying down probably isn't comfortable either. You may have trouble getting to sleep or staying asleep. Most women deliver their babies between 40 and 41 weeks. This is a good time to think about packing a bag for the hospital with items you'll need. Then you'll be ready when labor starts. Follow-up care is a key part of your treatment and safety. Be sure to make and go to all appointments, and call your doctor if you are having problems. It's also a good idea to know your test results and keep a list of the medicines you take. How can you care for yourself at home? Learn about breastfeeding · Breastfeeding is best for your baby and good for you. · Breast milk has antibodies to help your baby fight infections. · Mothers who breastfeed often lose weight faster, because making milk burns calories. · Learning the best ways to hold your baby will make breastfeeding easier. · Let your partner bathe and diaper the baby to keep your partner from feeling left out. Snuggle together when you breastfeed. · You may want to learn how to use a breast pump and store your milk. · If you choose to bottle feed, make the feeding feel like breastfeeding so you can bond with your baby. Always hold your baby and the bottle. Do not prop bottles or let your baby fall asleep with a bottle. Learn about crying · It is common for babies to cry for 1 to 3 hours a day. Some cry more, some cry less. · Babies don't cry to make you upset or because you are a bad parent. · Crying is how your baby communicates. Your baby may be hungry; have gas; need a diaper change; or feel cold, warm, tired, lonely, or tense. Sometimes babies cry for unknown reasons. · If you respond to your baby's needs, he or she will learn to trust you. · Try to stay calm when your baby cries. Your baby may get more upset if he or she senses that you are upset. Know how to care for your  · Your baby's umbilical cord stump will drop off on its own, usually between 1 and 2 weeks. To care for your baby's umbilical cord area: ? Clean the area at the bottom of the cord 2 or 3 times a day. ? Pay special attention to the area where the cord attaches to the skin. ? Keep the diaper folded below the cord. ? Use a damp washcloth or cotton ball to sponge bathe your baby until the stump has come off. · Your baby's first dark stool is called meconium. After the meconium is passed, your baby will develop his or her own bowel pattern. ? Some babies, especially  babies, have several bowel movements a day. Others have one or two a day, or one every 2 to 3 days. ?  babies often have loose, yellow stools. Formula-fed babies have more formed stools. ? If your baby's stools look like little pellets, he or she is constipated. After 2 days of constipation, call your baby's doctor. · If your baby will be circumcised, you can care for him at home. ? Gently rinse his penis with warm water after every diaper change. Do not try to remove the film that forms on the penis. This film will go away on its own. Pat dry. ? Put petroleum ointment, such as Vaseline, on the area of the diaper that will touch your baby's penis. This will keep the diaper from sticking to your baby. ? Ask the doctor about giving your baby acetaminophen (Tylenol) for pain. Where can you learn more? Go to http://cassie-bree.info/ Enter 68 21 [id number] in the search box to learn more about \"Week 37 of Your Pregnancy: Care Instructions. \" Current as of: 2020               Content Version: 12.6 © 1030-7557 [employer]wise, Incorporated.   
Care instructions adapted under license by [employer] (which disclaims liability or warranty for this information). If you have questions about a medical condition or this instruction, always ask your healthcare professional. [address] any warranty or liability for your use of this information.

## 2020-11-10 DIAGNOSIS — Z34.80 SUPERVISION OF OTHER NORMAL PREGNANCY: Primary | ICD-10-CM

## 2020-11-19 ENCOUNTER — ROUTINE PRENATAL (OUTPATIENT)
Dept: OBGYN CLINIC | Age: 35
End: 2020-11-19
Payer: COMMERCIAL

## 2020-11-19 VITALS — BODY MASS INDEX: 31.01 KG/M2 | WEIGHT: 198 LBS | DIASTOLIC BLOOD PRESSURE: 84 MMHG | SYSTOLIC BLOOD PRESSURE: 116 MMHG

## 2020-11-19 DIAGNOSIS — Z36.9 UNSPECIFIED ANTENATAL SCREENING: Primary | ICD-10-CM

## 2020-11-19 DIAGNOSIS — Z34.03 ENCOUNTER FOR SUPERVISION OF NORMAL FIRST PREGNANCY IN THIRD TRIMESTER: ICD-10-CM

## 2020-11-19 LAB — GRBS, EXTERNAL: NEGATIVE

## 2020-11-19 PROCEDURE — 0502F SUBSEQUENT PRENATAL CARE: CPT | Performed by: OBSTETRICS & GYNECOLOGY

## 2020-11-19 NOTE — PATIENT INSTRUCTIONS

## 2020-11-23 LAB
B-HEM STREP SPEC QL CULT: NEGATIVE
SPECIMEN STATUS REPORT, ROLRST: NORMAL

## 2020-11-24 ENCOUNTER — ROUTINE PRENATAL (OUTPATIENT)
Dept: OBGYN CLINIC | Age: 35
End: 2020-11-24
Payer: COMMERCIAL

## 2020-11-24 VITALS — DIASTOLIC BLOOD PRESSURE: 88 MMHG | WEIGHT: 198.4 LBS | SYSTOLIC BLOOD PRESSURE: 122 MMHG | BODY MASS INDEX: 31.07 KG/M2

## 2020-11-24 DIAGNOSIS — Z34.80 PRENATAL CARE OF MULTIGRAVIDA, ANTEPARTUM: ICD-10-CM

## 2020-11-24 DIAGNOSIS — Z34.80 SUPERVISION OF OTHER NORMAL PREGNANCY: Primary | ICD-10-CM

## 2020-11-24 PROCEDURE — 0502F SUBSEQUENT PRENATAL CARE: CPT | Performed by: OBSTETRICS & GYNECOLOGY

## 2020-11-24 NOTE — PATIENT INSTRUCTIONS

## 2020-11-25 ENCOUNTER — TELEPHONE (OUTPATIENT)
Dept: OBGYN CLINIC | Age: 35
End: 2020-11-25

## 2020-11-25 NOTE — TELEPHONE ENCOUNTER
MD Shari Hines Cleo Allan, RN    Caller: Unspecified (Today,  9:44 AM)               Just take the one heparin injection tonight, and go back to twice daily tomorrow. Patient advised of work in [place] PEREZ recommendations and verbalized understanding.

## 2020-11-25 NOTE — TELEPHONE ENCOUNTER
Pharmacy left a message about the prescription for patients syringes. This nurse called back and pharmacy advised that they do not have the 27 they only have 31 gauge.     Pharmacy states they will check with other pharmacy locations for the syringe      This nurse attempted to reach the patient to ask about whether the prescription can be sent to a different pharmacy

## 2020-11-25 NOTE — TELEPHONE ENCOUNTER
Patient left a message at 12:12PM regarding her prescription for the needles    This nurse called the patient back and she advised that she has gotten a partial order of 5 needles and is on the way to pick them up.     Patient is wondering how to proceed as far as getting the two injections in     Should she only to one heparin injection today    Please advise

## 2020-11-25 NOTE — TELEPHONE ENCOUNTER
Call received at 540am    38year old patient  37w5d pregnant patient last seen in the office yesterday. Patient states every thing is ok with the baby    Patient calling to say that the prescription for her needles is not at the pharmacy. This nurse called the pharmacy and they have not received the prescription    This nurse resent the prescription to patient confirmed pharmacy.     Patient is wondering how to proceed since she has not yet had her injection this am      Please advise

## 2020-12-01 ENCOUNTER — TRANSCRIBE ORDER (OUTPATIENT)
Dept: REGISTRATION | Age: 35
End: 2020-12-01

## 2020-12-01 ENCOUNTER — HOSPITAL ENCOUNTER (OUTPATIENT)
Dept: PREADMISSION TESTING | Age: 35
Discharge: HOME | End: 2020-12-01
Payer: COMMERCIAL

## 2020-12-01 DIAGNOSIS — Z01.812 PRE-PROCEDURE LAB EXAM: ICD-10-CM

## 2020-12-01 DIAGNOSIS — Z01.812 PRE-PROCEDURE LAB EXAM: Primary | ICD-10-CM

## 2020-12-01 PROCEDURE — 87635 SARS-COV-2 COVID-19 AMP PRB: CPT

## 2020-12-02 LAB — SARS-COV-2, COV2NT: NOT DETECTED

## 2020-12-04 ENCOUNTER — ROUTINE PRENATAL (OUTPATIENT)
Dept: OBGYN CLINIC | Age: 35
End: 2020-12-04
Payer: COMMERCIAL

## 2020-12-04 VITALS — SYSTOLIC BLOOD PRESSURE: 106 MMHG | DIASTOLIC BLOOD PRESSURE: 80 MMHG | WEIGHT: 197.6 LBS | BODY MASS INDEX: 30.95 KG/M2

## 2020-12-04 DIAGNOSIS — Z34.80 SUPERVISION OF OTHER NORMAL PREGNANCY: Primary | ICD-10-CM

## 2020-12-04 DIAGNOSIS — Z34.80 PRENATAL CARE OF MULTIGRAVIDA, ANTEPARTUM: ICD-10-CM

## 2020-12-04 PROCEDURE — 0502F SUBSEQUENT PRENATAL CARE: CPT | Performed by: OBSTETRICS & GYNECOLOGY

## 2020-12-04 RX ORDER — HEPARIN SODIUM 5000 [USP'U]/ML
INJECTION INTRAVENOUS; SUBCUTANEOUS
COMMUNITY
Start: 2020-10-28 | End: 2020-12-08

## 2020-12-04 NOTE — PATIENT INSTRUCTIONS
Week 39 of Your Pregnancy: Care Instructions  Your Care Instructions     During these final weeks, you may feel anxious to see your new baby. [place] babies often look different from what you see in pictures or movies. Right after birth, their heads may have a strange shape. Their eyes may be puffy. And their genitals may be swollen. They may also have very dry skin, or red marks on the eyelids, nose, or neck. Still, most parents think their babies are beautiful. Follow-up care is a key part of your treatment and safety. Be sure to make and go to all appointments, and call your doctor if you are having problems. It's also a good idea to know your test results and keep a list of the medicines you take. How can you care for yourself at home? Prepare to breastfeed  · If you are breastfeeding, continue to eat healthy foods. · If your health care provider recommends it, keep taking your prenatal vitamins. · Talk to your doctor before you take any medicine or supplement. That's because some medicines can affect your breast milk. · You can help prevent sore nipples if you feed your baby in the correct position. Nurses will help you learn to do this. Choose the right birth control after your baby is born  · Women who are breastfeeding can still get pregnant. Use birth control if you don't want to get pregnant. · Intrauterine devices (IUDs) are very effective at preventing pregnancy and can provide birth control for 3 to 10 years, depending on the type. If you talk with your doctor before having your baby, the IUD can be placed right after you give birth. If you decide you want to get pregnant later, you can have it removed. They are safe to use while you are breastfeeding. · A hormonal implant is also very effective at preventing pregnancy. It is placed under the skin of the arm. This can be done right after you give birth. It releases the hormone progestin and prevents pregnancy for about 3 years.  This can also be removed by a doctor at any time. It is safe to use while you are breastfeeding. · Depo-Provera can be used while you are breastfeeding. It is a shot you get every 3 months. · Birth control pills work well. But you need a different kind of pill for the first few weeks after giving birth. And when you start taking these pills, you need to make sure to use another type of birth control for 7 days after you start your pack. · Diaphragms, cervical caps, and condoms with spermicide work less well after birth. If you have a diaphragm or cervical cap, talk to your doctor to see if you need a different size. · Tubal ligation (tying your tubes) and vasectomy are both permanent. These are good options if you are sure you are done having children. Where can you learn more? Go to http://www.gray.com/  Enter A811 in the search box to learn more about \"Week 39 of Your Pregnancy: Care Instructions. \"  Current as of: February 11, 2020               Content Version: 12.6  © 9435-5540 [employer]. Care instructions adapted under license by [employer] (which disclaims liability or warranty for this information). If you have questions about a medical condition or this instruction, always ask your healthcare professional. [address] any warranty or liability for your use of this information.

## 2020-12-06 ENCOUNTER — HOSPITAL ENCOUNTER (INPATIENT)
Age: 35
LOS: 2 days | Discharge: HOME | End: 2020-12-08
Attending: OBSTETRICS & GYNECOLOGY | Admitting: OBSTETRICS & GYNECOLOGY
Payer: COMMERCIAL

## 2020-12-06 LAB
ERYTHROCYTE [DISTWIDTH] IN BLOOD BY AUTOMATED COUNT: 13 % (ref 11.5–14.5)
HCT VFR BLD AUTO: 35.4 % (ref 35–47)
HGB BLD-MCNC: 11.9 G/DL (ref 11.5–16)
MCH RBC QN AUTO: 30.1 PG (ref 26–34)
MCHC RBC AUTO-ENTMCNC: 33.6 G/DL (ref 30–36.5)
MCV RBC AUTO: 89.6 FL (ref 80–99)
NRBC # BLD: 0 K/UL (ref 0–0.01)
NRBC BLD-RTO: 0 PER 100 WBC
PLATELET # BLD AUTO: 124 K/UL (ref 150–400)
RBC # BLD AUTO: 3.95 M/UL (ref 3.8–5.2)
WBC # BLD AUTO: 8.2 K/UL (ref 3.6–11)

## 2020-12-06 PROCEDURE — 85027 COMPLETE CBC AUTOMATED: CPT

## 2020-12-06 PROCEDURE — 74011250637 HC RX REV CODE- 250/637: Performed by: OBSTETRICS & GYNECOLOGY

## 2020-12-06 PROCEDURE — 65270000029 HC RM PRIVATE

## 2020-12-06 PROCEDURE — 75410000002 HC LABOR FEE PER 1 HR

## 2020-12-06 PROCEDURE — 36415 COLL VENOUS BLD VENIPUNCTURE: CPT

## 2020-12-06 RX ORDER — TERBUTALINE SULFATE 1 MG/ML
0.25 INJECTION SUBCUTANEOUS AS NEEDED
Status: DISCONTINUED | OUTPATIENT
Start: 2020-12-06 | End: 2020-12-07

## 2020-12-06 RX ORDER — DIPHENHYDRAMINE HCL 50 MG
50 CAPSULE ORAL
Status: DISCONTINUED | OUTPATIENT
Start: 2020-12-06 | End: 2020-12-07

## 2020-12-06 RX ORDER — BUTORPHANOL TARTRATE 2 MG/ML
2 INJECTION INTRAMUSCULAR; INTRAVENOUS
Status: DISCONTINUED | OUTPATIENT
Start: 2020-12-06 | End: 2020-12-07

## 2020-12-06 RX ORDER — SODIUM CHLORIDE, SODIUM LACTATE, POTASSIUM CHLORIDE, CALCIUM CHLORIDE 600; 310; 30; 20 MG/100ML; MG/100ML; MG/100ML; MG/100ML
125 INJECTION, SOLUTION INTRAVENOUS CONTINUOUS
Status: DISCONTINUED | OUTPATIENT
Start: 2020-12-07 | End: 2020-12-07

## 2020-12-06 RX ADMIN — MISOPROSTOL 25 MCG: 100 TABLET ORAL at 23:38

## 2020-12-06 RX ADMIN — MISOPROSTOL 25 MCG: 100 TABLET ORAL at 19:36

## 2020-12-06 RX ADMIN — DIPHENHYDRAMINE HYDROCHLORIDE 50 MG: 50 CAPSULE ORAL at 23:02

## 2020-12-07 ENCOUNTER — ANESTHESIA EVENT (OUTPATIENT)
Dept: LABOR AND DELIVERY | Age: 35
End: 2020-12-07
Payer: COMMERCIAL

## 2020-12-07 ENCOUNTER — ANESTHESIA (OUTPATIENT)
Dept: LABOR AND DELIVERY | Age: 35
End: 2020-12-07
Payer: COMMERCIAL

## 2020-12-07 PROBLEM — Z98.890 STATUS POST INDUCTION OF LABOR: Status: ACTIVE | Noted: 2020-12-07

## 2020-12-07 PROCEDURE — 75410000002 HC LABOR FEE PER 1 HR

## 2020-12-07 PROCEDURE — 59400 OBSTETRICAL CARE: CPT | Performed by: OBSTETRICS & GYNECOLOGY

## 2020-12-07 PROCEDURE — 76060000078 HC EPIDURAL ANESTHESIA

## 2020-12-07 PROCEDURE — A4300 CATH IMPL VASC ACCESS PORTAL: HCPCS

## 2020-12-07 PROCEDURE — 75410000000 HC DELIVERY VAGINAL/SINGLE

## 2020-12-07 PROCEDURE — 74011250636 HC RX REV CODE- 250/636: Performed by: ANESTHESIOLOGY

## 2020-12-07 PROCEDURE — 65410000002 HC RM PRIVATE OB

## 2020-12-07 PROCEDURE — 74011000250 HC RX REV CODE- 250: Performed by: ANESTHESIOLOGY

## 2020-12-07 PROCEDURE — 74011250637 HC RX REV CODE- 250/637: Performed by: OBSTETRICS & GYNECOLOGY

## 2020-12-07 PROCEDURE — 74011250636 HC RX REV CODE- 250/636: Performed by: OBSTETRICS & GYNECOLOGY

## 2020-12-07 PROCEDURE — 77030019905 HC CATH URETH INTMIT MDII -A

## 2020-12-07 PROCEDURE — 75410000003 HC RECOV DEL/VAG/CSECN EA 0.5 HR

## 2020-12-07 PROCEDURE — 00HU33Z INSERTION OF INFUSION DEVICE INTO SPINAL CANAL, PERCUTANEOUS APPROACH: ICD-10-PCS | Performed by: ANESTHESIOLOGY

## 2020-12-07 RX ORDER — LIDOCAINE HYDROCHLORIDE AND EPINEPHRINE 15; 5 MG/ML; UG/ML
INJECTION, SOLUTION EPIDURAL
Status: COMPLETED | OUTPATIENT
Start: 2020-12-07 | End: 2020-12-07

## 2020-12-07 RX ORDER — SIMETHICONE 80 MG
80 TABLET,CHEWABLE ORAL
Status: DISCONTINUED | OUTPATIENT
Start: 2020-12-07 | End: 2020-12-08 | Stop reason: HOSPADM

## 2020-12-07 RX ORDER — BUPIVACAINE HYDROCHLORIDE 2.5 MG/ML
INJECTION, SOLUTION EPIDURAL; INFILTRATION; INTRACAUDAL AS NEEDED
Status: DISCONTINUED | OUTPATIENT
Start: 2020-12-07 | End: 2020-12-07 | Stop reason: HOSPADM

## 2020-12-07 RX ORDER — ACETAMINOPHEN 325 MG/1
650 TABLET ORAL
Status: DISCONTINUED | OUTPATIENT
Start: 2020-12-07 | End: 2020-12-08 | Stop reason: HOSPADM

## 2020-12-07 RX ORDER — SODIUM CHLORIDE 0.9 % (FLUSH) 0.9 %
5-40 SYRINGE (ML) INJECTION EVERY 8 HOURS
Status: DISCONTINUED | OUTPATIENT
Start: 2020-12-07 | End: 2020-12-08 | Stop reason: HOSPADM

## 2020-12-07 RX ORDER — DOCUSATE SODIUM 100 MG/1
100 CAPSULE, LIQUID FILLED ORAL
Status: DISCONTINUED | OUTPATIENT
Start: 2020-12-07 | End: 2020-12-08 | Stop reason: HOSPADM

## 2020-12-07 RX ORDER — HYDROCORTISONE 1 %
CREAM (GRAM) TOPICAL AS NEEDED
Status: DISCONTINUED | OUTPATIENT
Start: 2020-12-07 | End: 2020-12-08 | Stop reason: HOSPADM

## 2020-12-07 RX ORDER — HYDROCODONE BITARTRATE AND ACETAMINOPHEN 5; 325 MG/1; MG/1
1 TABLET ORAL
Status: DISCONTINUED | OUTPATIENT
Start: 2020-12-07 | End: 2020-12-08 | Stop reason: HOSPADM

## 2020-12-07 RX ORDER — OXYTOCIN/RINGER'S LACTATE 30/500 ML
87.3 PLASTIC BAG, INJECTION (ML) INTRAVENOUS AS NEEDED
Status: DISCONTINUED | OUTPATIENT
Start: 2020-12-07 | End: 2020-12-08 | Stop reason: HOSPADM

## 2020-12-07 RX ORDER — AMMONIA 15 % (W/V)
1 AMPUL (EA) INHALATION AS NEEDED
Status: DISCONTINUED | OUTPATIENT
Start: 2020-12-07 | End: 2020-12-08 | Stop reason: HOSPADM

## 2020-12-07 RX ORDER — FENTANYL CITRATE 50 UG/ML
INJECTION, SOLUTION INTRAMUSCULAR; INTRAVENOUS
Status: COMPLETED
Start: 2020-12-07 | End: 2020-12-07

## 2020-12-07 RX ORDER — ENOXAPARIN SODIUM 100 MG/ML
40 INJECTION SUBCUTANEOUS EVERY 24 HOURS
Status: DISCONTINUED | OUTPATIENT
Start: 2020-12-07 | End: 2020-12-08 | Stop reason: HOSPADM

## 2020-12-07 RX ORDER — IBUPROFEN 400 MG/1
800 TABLET ORAL EVERY 8 HOURS
Status: DISCONTINUED | OUTPATIENT
Start: 2020-12-07 | End: 2020-12-07

## 2020-12-07 RX ORDER — HYDROCORTISONE ACETATE PRAMOXINE HCL 2.5; 1 G/100G; G/100G
CREAM TOPICAL AS NEEDED
Status: DISCONTINUED | OUTPATIENT
Start: 2020-12-07 | End: 2020-12-08 | Stop reason: HOSPADM

## 2020-12-07 RX ORDER — ZOLPIDEM TARTRATE 5 MG/1
5 TABLET ORAL
Status: DISCONTINUED | OUTPATIENT
Start: 2020-12-07 | End: 2020-12-08 | Stop reason: HOSPADM

## 2020-12-07 RX ORDER — FENTANYL CITRATE 50 UG/ML
100 INJECTION, SOLUTION INTRAMUSCULAR; INTRAVENOUS ONCE
Status: DISCONTINUED | OUTPATIENT
Start: 2020-12-07 | End: 2020-12-07

## 2020-12-07 RX ORDER — NALOXONE HYDROCHLORIDE 0.4 MG/ML
0.4 INJECTION, SOLUTION INTRAMUSCULAR; INTRAVENOUS; SUBCUTANEOUS AS NEEDED
Status: DISCONTINUED | OUTPATIENT
Start: 2020-12-07 | End: 2020-12-07

## 2020-12-07 RX ORDER — FENTANYL CITRATE 50 UG/ML
INJECTION, SOLUTION INTRAMUSCULAR; INTRAVENOUS AS NEEDED
Status: DISCONTINUED | OUTPATIENT
Start: 2020-12-07 | End: 2020-12-07 | Stop reason: HOSPADM

## 2020-12-07 RX ORDER — EPHEDRINE SULFATE/0.9% NACL/PF 50 MG/5 ML
10 SYRINGE (ML) INTRAVENOUS
Status: DISCONTINUED | OUTPATIENT
Start: 2020-12-07 | End: 2020-12-07

## 2020-12-07 RX ORDER — SODIUM CHLORIDE 0.9 % (FLUSH) 0.9 %
5-40 SYRINGE (ML) INJECTION AS NEEDED
Status: DISCONTINUED | OUTPATIENT
Start: 2020-12-07 | End: 2020-12-08 | Stop reason: HOSPADM

## 2020-12-07 RX ORDER — OXYTOCIN/RINGER'S LACTATE 30/500 ML
10 PLASTIC BAG, INJECTION (ML) INTRAVENOUS AS NEEDED
Status: COMPLETED | OUTPATIENT
Start: 2020-12-07 | End: 2020-12-07

## 2020-12-07 RX ORDER — FENTANYL/BUPIVACAINE/NS/PF 2-1250MCG
1-16 PREFILLED PUMP RESERVOIR EPIDURAL CONTINUOUS
Status: DISCONTINUED | OUTPATIENT
Start: 2020-12-07 | End: 2020-12-07

## 2020-12-07 RX ORDER — OXYTOCIN/RINGER'S LACTATE 30/500 ML
95 PLASTIC BAG, INJECTION (ML) INTRAVENOUS AS NEEDED
Status: COMPLETED | OUTPATIENT
Start: 2020-12-07 | End: 2020-12-07

## 2020-12-07 RX ORDER — BUPIVACAINE HYDROCHLORIDE 2.5 MG/ML
INJECTION, SOLUTION EPIDURAL; INFILTRATION; INTRACAUDAL
Status: COMPLETED
Start: 2020-12-07 | End: 2020-12-07

## 2020-12-07 RX ORDER — OXYTOCIN/RINGER'S LACTATE 30/500 ML
10 PLASTIC BAG, INJECTION (ML) INTRAVENOUS AS NEEDED
Status: DISCONTINUED | OUTPATIENT
Start: 2020-12-07 | End: 2020-12-08 | Stop reason: HOSPADM

## 2020-12-07 RX ADMIN — SODIUM CHLORIDE, SODIUM LACTATE, POTASSIUM CHLORIDE, AND CALCIUM CHLORIDE 125 ML/HR: 600; 310; 30; 20 INJECTION, SOLUTION INTRAVENOUS at 10:08

## 2020-12-07 RX ADMIN — Medication 10 ML/HR: at 08:17

## 2020-12-07 RX ADMIN — BUPIVACAINE HYDROCHLORIDE 5 ML: 2.5 INJECTION, SOLUTION EPIDURAL; INFILTRATION; INTRACAUDAL; PERINEURAL at 08:13

## 2020-12-07 RX ADMIN — FENTANYL CITRATE 100 MCG: 50 INJECTION, SOLUTION INTRAMUSCULAR; INTRAVENOUS at 08:11

## 2020-12-07 RX ADMIN — SODIUM CHLORIDE, SODIUM LACTATE, POTASSIUM CHLORIDE, AND CALCIUM CHLORIDE 999 ML/HR: 600; 310; 30; 20 INJECTION, SOLUTION INTRAVENOUS at 07:24

## 2020-12-07 RX ADMIN — OXYTOCIN 95 MILLI-UNITS/MIN: 10 INJECTION, SOLUTION INTRAMUSCULAR; INTRAVENOUS at 11:31

## 2020-12-07 RX ADMIN — LIDOCAINE HYDROCHLORIDE,EPINEPHRINE BITARTRATE 4.5 ML: 15; .005 INJECTION, SOLUTION EPIDURAL; INFILTRATION; INTRACAUDAL; PERINEURAL at 08:08

## 2020-12-07 RX ADMIN — ACETAMINOPHEN 650 MG: 325 TABLET ORAL at 13:16

## 2020-12-07 RX ADMIN — ACETAMINOPHEN 650 MG: 325 TABLET ORAL at 17:46

## 2020-12-07 RX ADMIN — ENOXAPARIN SODIUM 40 MG: 40 INJECTION SUBCUTANEOUS at 21:27

## 2020-12-07 RX ADMIN — OXYTOCIN: 10 INJECTION, SOLUTION INTRAMUSCULAR; INTRAVENOUS at 11:23

## 2020-12-07 NOTE — H&P
History & Physical    Name: Annika Milligan MRN: [id number]  SSN: [id number]    YOB: 1985  Age: 38 y.o. Sex: female        Subjective:     Estimated Date of Delivery: 20  OB History        3    Para   2    Term   2            AB        Living           SAB        TAB        Ectopic        Molar        Multiple        Live Births                    Ms. Maria Del Carmen Wang is admitted with pregnancy at 39w2d for induction of labor. Prenatal course as below. Please see prenatal records for details. Patient Active Problem List    Diagnosis    Prenatal care of multigravida, antepartum     Induction 20- set  Primary Provider: Harris Tapia P2  [address] by LMP/US     COVID negative  AMA- MFM FS nl   Hx  x 2 40+ weeks; G2 precipitous <2hours 40wk 7lb 9oz in [place]; both low intervention  Hx Sinus venous thrombosis between pregnancies; on lovenox last pregnancy; negative evaluation; lovenox 40mg SC daily; 36-37wk heparin 10, 000 units BID  Hx retained placenta following 2nd delivery, with hemorrhage   [facility]  IOB labs: WNL  Genetic Screening: Declines Panorama/ all genetics  Anatomy:  normal  GTT: 141 - 3 Hour nl  Flu andTDAP: done   GBS: negative  Circ:           Headache    PCOS (polycystic ovarian syndrome)    Venous thrombosis     Sinus; occurred in between her pregnancies       No specialty comments available.   Past Medical History:   Diagnosis Date    Abnormal Papanicolaou smear of cervix     WNL since    Headache     PCOS (polycystic ovarian syndrome)     Polycystic disease, ovaries     Venous thrombosis 2016    Sinus     Past Surgical History:   Procedure Laterality Date    HX HYSTEROSCOPY  2020    Removal of imbedded IUD arm    HX OTHER SURGICAL      Sinus venous thrombosis     Social History     Occupational History    Not on file   Tobacco Use    Smoking status: Never Smoker    Smokeless tobacco: Never Used   Substance and Sexual Activity    Alcohol use: Not Currently     Comment: 2-4 drinks/week    Drug use: Never    Sexual activity: Yes     Partners: Male     Family History   Problem Relation Age of Onset    Breast Cancer Mother 39    High Cholesterol Mother     Parkinson's Disease Father        No Known Allergies  Prior to Admission medications    Medication Sig Start Date End Date Taking? Authorizing Provider   heparin sodium,porcine/PF (heparin, porcine, PF,) 5,000 unit/mL syrg INJECT 1 SYRINGE BY INJECTION ROUTE Q 12 H FOR 46 DAYS 10/28/20  Yes Provider, Historical   Needle, Disp, 27 G 27 gauge x 1/2\" ndle 1 Device by SubCUTAneous route two (2) times a day. 13/03/49  Yes Mak Daily MD   Omeprazole delayed release (PRILOSEC D/R) 20 mg tablet Take 20 mg by mouth daily. Yes Provider, Historical   PNV No12-Iron-FA-DSS-OM-3 29 mg iron-1 mg -50 mg CPKD Take  by mouth. Yes Provider, Historical        Review of Systems: A comprehensive review of systems was negative except for that written in the HPI. Objective:     Vitals:  Vitals:    12/06/20 1859 12/06/20 1900   BP:  123/65   Pulse:  69   Resp:  16   Temp:  97.5 °F (36.4 °C)   Weight: 198 lb (89.8 kg)    Height: 5' 7\" (1.702 m)         Physical Exam:  Patient without distress. Heart: Regular rate and rhythm  Lung: clear to auscultation throughout lung fields, no wheezes, no rales, no rhonchi and normal respiratory effort  Cervical Exam: 3 cm dilated    60% effaced    -2 station    Presenting Part: cephalic  Membranes:  Intact  Fetal Heart Rate: Reactive  Accelerations: yes    Prenatal Labs:   Lab Results   Component Value Date/Time    Rubella, External Immune 06/03/2020    GrBStrep, External negative 11/19/2020    HBsAg, External negative 06/03/2020    HIV, External Non reactive 09/21/2020    Gonorrhea, External Negative 06/03/2020    Chlamydia, External Negative 06/03/2020        Assessment/Plan:     Plan: Admit for Reassuring fetal status, Continue plan for vaginal delivery.   Group B Strep was negative. Intravaginal 25mcg cytotec now and may repeat in 4 hours.   Hoping for low intervention    Signed By:  Keith Martinez MD     December 6, 2020

## 2020-12-07 NOTE — ANESTHESIA PROCEDURE NOTES
Epidural Block    Performed by:  Valentin Dennis MD  Authorized by: Temo Caceres MD     Pre-Procedure  Indication: labor epidural    Preanesthetic Checklist: patient identified, risks and benefits discussed, patient being monitored and timeout performed      Epidural:   Patient position:  Left lateral decubitus  Prep region:  Lumbar  Prep: DuraPrep    Location:  L2-3    Needle and Epidural Catheter:   Needle Type:  Tuohy  Needle Gauge:  17 G  Injection Technique:  Loss of resistance using air and loss of resistance using saline  Attempts:  1  Catheter Size:  19 G  Catheter at Skin Depth (cm):  12  Depth in Epidural Space (cm):  5  Events: no blood with aspiration, no cerebrospinal fluid with aspiration, no paresthesia and negative aspiration test    Test Dose:  Negative    Assessment:   Catheter Secured:  Tegaderm and tape  Insertion:  Uncomplicated  Patient tolerance:  Patient tolerated the procedure well with no immediate complications

## 2020-12-07 NOTE — ANESTHESIA PREPROCEDURE EVALUATION
Relevant Problems   No relevant active problems       Anesthetic History   No history of anesthetic complications            Review of Systems / Medical History  Patient summary reviewed, nursing notes reviewed and pertinent labs reviewed    Pulmonary  Within defined limits                 Neuro/Psych   Within defined limits           Cardiovascular  Within defined limits                Exercise tolerance: >4 METS     GI/Hepatic/Renal  Within defined limits              Endo/Other  Within defined limits           Other Findings   Comments: PCOS  H/o sinus venous thrombosis on SQ heparin- last dose 12/6           Physical Exam    Airway  Mallampati: II  TM Distance: 4 - 6 cm  Neck ROM: normal range of motion   Mouth opening: Normal     Cardiovascular  Regular rate and rhythm,  S1 and S2 normal,  no murmur, click, rub, or gallop             Dental  No notable dental hx       Pulmonary  Breath sounds clear to auscultation               Abdominal  GI exam deferred       Other Findings            Anesthetic Plan    ASA: 2  Anesthesia type: epidural            Anesthetic plan and risks discussed with: Patient

## 2020-12-07 NOTE — ROUTINE PROCESS
TRANSFER - IN REPORT:    Verbal report received from IDALIA Brown RN(name) on Sosa Mcintosh  being received from L&D(unit) for routine progression of care      Report consisted of patients Situation, Background, Assessment and   Recommendations(SBAR). Information from the following report(s) SBAR, Kardex, Procedure Summary, Intake/Output, MAR and Recent Results was reviewed with the receiving nurse. Opportunity for questions and clarification was provided. Assessment completed upon patients arrival to unit and care assumed.

## 2020-12-07 NOTE — PROGRESS NOTES
2513 Bedside and Verbal shift change report given to IDALIA Caceres RN (oncoming nurse) by DANIAL Pretty RN (offgoing nurse). Report included the following information SBAR, Intake/Output, MAR and Recent Results. 8448 Dr Ayaka Christine at bedside and aware patient is getting ready for epidural    0800 Dr Thea Torres at bedside for epidural    1107 Notified Dr Ayaka Christine patient 10cm  and MD on way for delivery    1110 Dr Ayaka Christine at bedside for delivery    1215 [place] REPORT:    Verbal report given to Mariama Sequeira RN(name) on Erwin Moss  being transferred to MIU(unit) for routine progression of care       Report consisted of patients Situation, Background, Assessment and   Recommendations(SBAR). Information from the following report(s) SBAR, Intake/Output, MAR and Recent Results was reviewed with the receiving nurse. Lines:   Peripheral IV 12/06/20 Anterior; Left Forearm (Active)   Site Assessment Clean, dry, & intact 12/07/20 0745   Phlebitis Assessment 0 12/07/20 0745   Infiltration Assessment 0 12/07/20 0745   Dressing Status Clean, dry, & intact 12/07/20 0745   Dressing Type Transparent;Tape 12/07/20 0745   Hub Color/Line Status Pink 12/07/20 0745        Opportunity for questions and clarification was provided.       Patient transported with:   Registered Nurse

## 2020-12-08 VITALS
BODY MASS INDEX: 31.08 KG/M2 | WEIGHT: 198 LBS | HEIGHT: 67 IN | DIASTOLIC BLOOD PRESSURE: 68 MMHG | TEMPERATURE: 97.4 F | HEART RATE: 83 BPM | OXYGEN SATURATION: 93 % | SYSTOLIC BLOOD PRESSURE: 102 MMHG | RESPIRATION RATE: 16 BRPM

## 2020-12-08 PROCEDURE — 2709999900 HC NON-CHARGEABLE SUPPLY

## 2020-12-08 PROCEDURE — 74011250637 HC RX REV CODE- 250/637: Performed by: OBSTETRICS & GYNECOLOGY

## 2020-12-08 PROCEDURE — 3E033VJ INTRODUCTION OF OTHER HORMONE INTO PERIPHERAL VEIN, PERCUTANEOUS APPROACH: ICD-10-PCS | Performed by: OBSTETRICS & GYNECOLOGY

## 2020-12-08 PROCEDURE — 0HQ9XZZ REPAIR PERINEUM SKIN, EXTERNAL APPROACH: ICD-10-PCS | Performed by: OBSTETRICS & GYNECOLOGY

## 2020-12-08 PROCEDURE — 3E0P7VZ INTRODUCTION OF HORMONE INTO FEMALE REPRODUCTIVE, VIA NATURAL OR ARTIFICIAL OPENING: ICD-10-PCS | Performed by: OBSTETRICS & GYNECOLOGY

## 2020-12-08 RX ORDER — ENOXAPARIN SODIUM 100 MG/ML
40 INJECTION SUBCUTANEOUS EVERY 24 HOURS
Qty: 30 SYRINGE | Refills: 3 | Status: SHIPPED | OUTPATIENT
Start: 2020-12-08 | End: 2021-01-21 | Stop reason: ALTCHOICE

## 2020-12-08 RX ADMIN — ACETAMINOPHEN 650 MG: 325 TABLET ORAL at 08:46

## 2020-12-08 RX ADMIN — ACETAMINOPHEN 650 MG: 325 TABLET ORAL at 13:49

## 2020-12-08 RX ADMIN — ACETAMINOPHEN 650 MG: 325 TABLET ORAL at 00:39

## 2020-12-08 NOTE — DISCHARGE INSTRUCTIONS
POST DELIVERY DISCHARGE INSTRUCTIONS    Name: Therese Lutz  YOB: 1985  Primary Diagnosis: Active Problems:    Status post induction of labor (2020)        General:     Diet/Diet Restrictions:  Eight 8-ounce glasses of fluid daily (water, juices); avoid excessive caffeine intake. Meals/snacks as desired which are high in fiber and carbohydrates and low in fat and cholesterol. Physical Activity / Restrictions / Safety:     Avoid heavy lifting, no more that 8 lbs. For 2-3 weeks; limit use of stairs to 2 times daily for the first week home. No driving for one week. Avoid intercourse 4-6 weeks, no douching or tampon use. Check with obstetrician before starting or resuming an exercise program.         Discharge Instructions/Special Treatment/Home Care Needs:     Continue prenatal vitamins. Continue to use squirt bottle with warm water on your episiotomy after each bathroom use until bleeding stops. If steri-strips applied to your incision, remove in 7-10 days. Call your doctor for the following:     Fever over 101 degrees by mouth. Vaginal bleeding heavier than a normal menstrual period or clot larger than a golf ball. Red streaks or increased swelling of legs, painful red streaks on your breast.  Painful urination, constipation and increased pain or swelling or discharge with your incision. If you feel extremely anxious or overwhelmed. If you have thoughts of harming yourself and/or your baby. Pain Management:     Pain Management:   Take Acetaminophen (Tylenol) or Ibuprofen (Advil, Motrin), as directed for pain. Use a warm Sitz bath 3 times daily to relieve episiotomy or hemorrhoidal discomfort. Heating pad to  incision as needed. For hemorrhoidal discomfort, use Tucks and Anusol cream as needed and directed. Follow-Up Care:      These are general instructions for a healthy lifestyle:    No smoking/ No tobacco products/ Avoid exposure to second hand smoke    Surgeon General's Warning:  Quitting smoking now greatly reduces serious risk to your health. Obesity, smoking, and sedentary lifestyle greatly increases your risk for illness    A healthy diet, regular physical exercise & weight monitoring are important for maintaining a healthy lifestyle    Recognize signs and symptoms of STROKE:    F-face looks uneven    A-arms unable to move or move unevenly    S-speech slurred or non-existent    T-time-call 911 as soon as signs and symptoms begin-DO NOT go       Back to bed or wait to see if you get better-TIME IS BRAIN. POSTPARTUM DISCHARGE INSTRUCTIONS       Name:  Hanh Berman  YOB: 1985  Admission Diagnosis:  Status post induction of labor [Z98.890]     Discharge Diagnosis:    Problem List as of 2020 Date Reviewed: 2020          Codes Class Noted - Resolved    Status post induction of labor ICD-10-CM: Z98.890  ICD-9-CM: 659.90  2020 - Present        Prenatal care of multigravida, antepartum ICD-10-CM: Z34.80  ICD-9-CM: V22.1  2020 - Present    Overview Addendum 2020  1:88 AM by Jordon Sr MD     Induction 20- set  Primary Provider: Marjorie Cabrales P2  EDC by LMP/US  [facility]   COVID negative  AMA- MFM FS nl   Hx  x 2 40+ weeks; G2 precipitous <2hours 40wk 7lb 9oz in [place]; both low intervention  Hx Sinus venous thrombosis between pregnancies; on lovenox last pregnancy; negative evaluation; lovenox 40mg SC daily; 36-37wk heparin 10, 000 units BID  Hx retained placenta following 2nd delivery, with hemorrhage   [place]  IOB labs: WNL  Genetic Screening: Declines Panorama/ all genetics  Anatomy:  normal  GTT: 141 - 3 Hour nl  Flu andTDAP: done   GBS: negative  Circ:                  Headache ICD-10-CM: R51.9  ICD-9-CM: 784.0  2020 - Present        PCOS (polycystic ovarian syndrome) ICD-10-CM: E28.2  ICD-9-CM: 256.4  2020 - Present        Venous thrombosis ICD-10-CM: I82.90  ICD-9-CM: 453.9  2016 - Present Overview Signed 2/17/2020  1:30 PM by Joleen Goodell, MD     Sinus; occurred in between her pregnancies                 Attending Physician:  Ofelia Plasencia MD    Delivery Type:  Vaginal Childbirth with Episiotomy, Laceration or Tear: What To Expect At [address] [place] [address] body will slowly heal in the next few weeks. It is easy to get too tired and overwhelmed during the first weeks after your baby is born. Changes in your hormones can shift your mood without warning. You may find it hard to meet the extra demands on your energy and time. Take it easy on yourself. Follow-up care is a key part of your treatment and safety. Be sure to make and go to all appointments, and call your doctor if you are having problems. It's also a good idea to know your test results and keep a list of the medicines you take. How can you care for yourself at home? Vaginal Bleeding and Cramps  · After delivery, you will have a bloody discharge from the vagina. This will turn pink within a week and then white or yellow after about 10 days. It may last for 2 to 4 weeks or longer, until the uterus has healed. Use pads instead of tampons until you stop bleeding. · Do not worry if you pass some blood clots, as long as they are smaller than a golf ball. If you have a tear or stitches in your vaginal area, change the pad at least every 4 hours to prevent soreness and infection. · You may have cramps for the first few days after childbirth. These are normal and occur as the uterus shrinks to normal size. Take an over-the-counter pain medicine, such as acetaminophen (Tylenol), ibuprofen (Advil, Motrin), or naproxen (Aleve), for cramps. Read and follow all instructions on the label. Do not take aspirin, because it can cause more bleeding. Do not take acetaminophen (Tylenol) and other acetaminophen containing medications (i.e. Percocet) at the same time.      Episiotomy, Lacerations or Tears  · If you have stitches, they will dissolve on their own and do not need to be removed. · Put ice or a cold pack on your painful area for 10 to 20 minutes at a time, several times a day, for the first few days. Put a thin cloth between the ice and your skin. · Sit in a few inches of warm water (sitz bath) 3 times a day and after bowel movements. The warm water helps with pain and itching. If you do not have a tub, a warm shower might help. Breast fullness  · Your breasts may overfill (engorge) in the first few days after delivery. To help milk flow and to relieve pain, warm your breasts in the shower or by using warm, moist towels before nursing. · If you are not nursing, do not put warmth on your breasts or touch your breasts. Wear a tight bra or sports bra and use ice until the fullness goes away. This usually takes 2 to 3 days. · Put ice or a cold pack on your breast after nursing to reduce swelling and pain. Put a thin cloth between the ice and your skin. Activity  · Eat a balanced diet. Do not try to lose weight by cutting calories. Keep taking your prenatal vitamins, or take a multivitamin. · Get as much rest as you can. Try to take naps when your baby sleeps during the day. · Get some exercise every day. But do not do any heavy exercise until your doctor says it is okay. · Wait until you are healed (about 4 to 6 weeks) before you have sexual intercourse. Your doctor will tell you when it is okay to have sex. · Talk to your doctor about birth control. You can get pregnant even before your period returns. Also, you can get pregnant while you are breast-feeding. Mental Health  · Many women get the \"baby blues\" during the first few days after childbirth. You may lose sleep, feel irritable, and cry easily. You may feel happy one minute and sad the next. Hormone changes are one cause of these emotional changes. Also, the demands of a new baby, along with visits from relatives or other family needs, add to a mother's stress. The \"baby blues\" often peak around the fourth day. Then they ease up in less than 2 weeks. · If your moodiness or anxiety lasts for more than 2 weeks, or if you feel like life is not worth living, you may have postpartum depression. This is different for each mother. Some mothers with serious depression may worry intensely about their infant's well-being. Others may feel distant from their child. Some mothers might even feel that they might harm their baby. A mother may have signs of paranoia, wondering if someone is watching her. · With all the changes in your life, you may not know if you are depressed. Pregnancy sometimes causes changes in how you feel that are similar to the symptoms of depression. · Symptoms of depression include:  · Feeling sad or hopeless and losing interest in daily activities. These are the most common symptoms of depression. · Sleeping too much or not enough. · Feeling tired. You may feel as if you have no energy. · Eating too much or too little. · [employer] (PSI) offers a Warm line; Chat with the Expert phone sessions; Information and Articles about Pregnancy and Postpartum Mood Disorders; Comprehensive List of Free Support Groups; Knowledgeable local coordinators who will offer support, information, and resources; Guide to Resources on [employer]; Calendar of events in the  mood disorders community; Latest News and Research; and [facility] [address] for [employer]. Remember - You are not alone; You are not to blame; With help, you will be well. 1-775-441-PPD(2911). WWW. POSTPARTUM. NET    · Writing or talking about death, such as writing suicide notes or talking about guns, knives, or pills. Keep the numbers for these national suicide hotlines: 9-805-258-TALK (1-652.138.1785) and 0-882-SDSPUHI (9-660.349.8610). If you or someone you know talks about suicide or feeling hopeless, get help right away.     Constipation and Hemorrhoids  Drink plenty of fluids, enough so that your urine is light yellow or clear like water. If you have kidney, heart, or liver disease and have to limit fluids, talk with your doctor before you increase the amount of fluids you drink. · Eat plenty of fiber each day. Have a bran muffin or bran cereal for breakfast, and try eating a piece of fruit for a mid-afternoon snack. · For painful, itchy hemorrhoids, put ice or a cold pack on the area several times a day for 10 minutes at a time. Follow this by putting a warm compress on the area for another 10 to 20 minutes or by sitting in a shallow, warm bath. When should you call for help? Call 911 anytime you think you may need emergency care. For example, call if:  · You are thinking of hurting yourself, your baby, or anyone else. · You passed out (lost consciousness). · You have symptoms of a blood clot in your lung (called a pulmonary embolism). These may include:  · Sudden chest pain. · Trouble breathing. · Coughing up blood. Call your doctor now or seek immediate medical care if:  · You have severe vaginal bleeding. · You are soaking through a pad each hour for 2 or more hours. · Your vaginal bleeding seems to be getting heavier or is still bright red 4 days after delivery. · You are dizzy or lightheaded, or you feel like you may faint. · You are vomiting or cannot keep fluids down. · You have a fever. · You have new or more belly pain. · You pass tissue (not just blood). · Your vaginal discharge smells bad. · Your belly feels tender or full and hard. · Your breasts are continuously painful or red. · You feel sad, anxious, or hopeless for more than a few days. · You have sudden, severe pain in your belly. · You have symptoms of a blood clot in your leg (called a deep vein thrombosis), such as:  · Pain in your calf, back of the knee, thigh, or groin. · Redness and swelling in your leg or groin.   · You have symptoms of preeclampsia, such as:  · Sudden swelling of your face, hands, or feet. · New vision problems (such as dimness or blurring). · A severe headache. · Your blood pressure is higher than it should be or rises suddenly. · You have new nausea or vomiting. Watch closely for changes in your health, and be sure to contact your doctor if you have any problems. Additional Information:  Postpartum Support    PARENTS:  Are you feeling sad or depressed? Is it difficult for you to enjoy yourself? Do you feel more irritable or tense? Do you feel anxious or panicky? Are you having difficulty bonding with your baby? Do you feel as if you are \"out of control\" or \"going crazy\"? Are you worried that you might hurt your baby or yourself? FAMILIES: Do you worry that something is wrong but don't know how to help? Do you think that your partner or spouse is having problems coping? Are you worried that it may never get better? While many women experience some mild mood change or \"the blues\" during or after the birth of a child, 1 in 9 women experience more significant symptoms of depression or anxiety. 1 in 10 Dads become depressed during the first year. Things you can do  Being a good parent includes taking care of yourself. If you take care of yourself, you will be able to take better care of your baby and your family. · Talk to a counselor or healthcare provider who has training in  mood and anxiety problems. · Learn as much as you can about pregnancy and postpartum depression and anxiety. · Get support from family and friends. Ask for help when you need it. · Join a support group in your area or online. · Keep active by walking, stretching or whatever form of exercise helps you to feel better. · Get enough rest and time for yourself. · Eat a healthy diet. · Don't give up! It may take more than one try to get the right help you need.        These are general instructions for a healthy lifestyle:    No smoking/ No tobacco products/ Avoid exposure to second hand smoke    Surgeon General's Warning:  Quitting smoking now greatly reduces serious risk to your health. Obesity, smoking, and sedentary lifestyle greatly increases your risk for illness    A healthy diet, regular physical exercise & weight monitoring are important for maintaining a healthy lifestyle    Recognize signs and symptoms of STROKE:    F-face looks uneven    A-arms unable to move or move unevenly    S-speech slurred or non-existent    T-time-call 911 as soon as signs and symptoms begin - DO NOT go       back to bed or wait to see if you get better - TIME IS BRAIN. I have had the opportunity to make my options or choices for discharge. I have received and understand these instructions.

## 2020-12-08 NOTE — ROUTINE PROCESS
0750: Bedside shift change report given to DEBBIE Laurent RN (oncoming nurse) by Geremias Calles RN (offgoing nurse). Report included the following information SBAR.

## 2020-12-08 NOTE — LACTATION NOTE
This note was copied from a baby's chart. Initial Lactation Consultation - Baby born vaginally yesterday afternoon to a  mom at 44 3/7 weeks gestation. Mom has 2 other children that she breast fed for 18 months each with an adequate milk supply. She says this baby has been latching and nursing well. Mom is hoping for discharge with baby later today. We reviewed cluster feeding. Frequent feeding during the brief behavioral phase preceeding milk transition is called cluster feeding. Typical  behavior: baby becomes vigorous at the breast and wants to feed frequently- every 1-2 hours for several feedings. Emptying of the breast twice produces double in subsequent feedings. This is the normal process by which the baby demands his/her supply. This type of frequent feeding is the stimulation which causes lactogenesis II (milk coming in). WE discussed engorgement. Breasts may become engorged when milk \"comes in\". How milk is made / normal phases of milk production, supply and demand discussed. Taught care of engorged breasts - frequent breastfeeding encouraged. Mom should put the baby to the breast and allow him to completely finish one breast before offering the second breast. She may pump a couple minutes after nursing for comfort. She can apply ice to the breasts for 10-15 minutes after nursing as needed. Pumping and returning to work/school discussed:  Start pumping for storage after first 2-3 weeks- about one hour after first AM feeding when supply is most abundant, once a day to start, timing of pumping at work/school, storage options and guidelines, and clean private pumping location (never in the bathroom). Breast feeding teaching completed and all questions answered.

## 2020-12-08 NOTE — DISCHARGE SUMMARY
Obstetrical Discharge Summary     Name: Faiza Monique MRN: [id number]  SSN: [id number]    YOB: 1985  Age: 38 y.o. Sex: female      Admit Date: 2020    Discharge Date: 2020     Admitting Physician: Susana Aguilera MD     Attending Physician:  Ailyn Felix MD     Admission Diagnoses: Status post induction of labor [Z98.890]    Discharge Diagnoses:   Information for the patient's :  Joe Lopez [[id number]]   Delivery of a 8 lb 0.4 oz (3.64 kg) male infant via Vaginal, Spontaneous on 2020 at 07:58 AM  by Susana Aguilera. Apgars were 9  and 9 . Additional Diagnoses:   Hospital Problems  Date Reviewed: 2020          Codes Class Noted POA    Status post induction of labor ICD-10-CM: Z98.890  ICD-9-CM: 659.90  2020 Unknown             Lab Results   Component Value Date/Time    Rubella, External Immune 2020    GrBStrep, External negative 2020       Hospital Course: Normal hospital course following the delivery. Patient will continue lovenox for 6 weeks postpartum    Disposition at Discharge: Home or self care    Discharged Condition: Stable    Patient Instructions:   Current Discharge Medication List      START taking these medications    Details   enoxaparin (LOVENOX) 40 mg/0.4 mL 0.4 mL by SubCUTAneous route every twenty-four (24) hours. Qty: 30 Syringe, Refills: 3         CONTINUE these medications which have NOT CHANGED    Details   Omeprazole delayed release (PRILOSEC D/R) 20 mg tablet Take 20 mg by mouth daily. PNV No12-Iron-FA-DSS-OM-3 29 mg iron-1 mg -50 mg CPKD Take  by mouth. STOP taking these medications       heparin sodium,porcine/PF (heparin, porcine, PF,) 5,000 unit/mL syrg Comments:   Reason for Stopping:         Needle, Disp, 27 G 27 gauge x 1/2\" ndle Comments:   Reason for Stopping:               Reference my discharge instructions.     Follow-up Appointments   Procedures    FOLLOW UP VISIT Appointment in: 6 Weeks Standing Status:   Standing     Number of Occurrences:   1     Order Specific Question:   Appointment in     Answer:   6 Weeks        Signed By:  Valerie Hernandez MD     December 8, 2020

## 2020-12-08 NOTE — PROGRESS NOTES
Post-Partum Day Number 1 Progress Note    Kandi Matos     Assessment: Doing well, post partum day 1    Plan:  1. Continue routine postpartum and perineal care as well as maternal education. 2. The risks and benefits of the circumcision  procedure and anesthesia including: bleeding, infection, variability of cosmetic results were discussed at length with the mother. She is aware that future repeat procedures may be necessary. She gives informed consent to proceed as noted and her questions are answered. Information for the patient's :  Allie Fox [[id number]]   Vaginal, Spontaneous    Patient doing well without significant complaint. Voiding without difficulty, normal lochia. Vitals:  Visit Vitals  /72 (BP 1 Location: Left arm, BP Patient Position: At rest)   Pulse 71   Temp 97.4 °F (36.3 °C)   Resp 16   Ht 5' 7\" (1.702 m)   Wt 198 lb (89.8 kg)   LMP 2020 (Exact Date)   SpO2 93%   Breastfeeding Unknown   BMI 31.01 kg/m²     Temp (24hrs), Av.9 °F (36.6 °C), Min:97.4 °F (36.3 °C), Max:98.2 °F (36.8 °C)        Exam:   Patient without distress. Abdomen soft, fundus firm, nontender                Perineum with normal lochia noted. Lower extremities are negative for swelling, cords or tenderness.     Labs:     Lab Results   Component Value Date/Time    WBC 8.2 2020 07:25 PM    WBC 10.7 2020 01:10 PM    WBC 8.7 2020 03:27 PM    WBC 8.2 2020 01:54 PM    HGB 11.9 2020 07:25 PM    HGB 11.6 2020 01:10 PM    HGB 12.6 2020 03:27 PM    HGB 12.8 2020 01:54 PM    HCT 35.4 2020 07:25 PM    HCT 33.9 (L) 2020 01:10 PM    HCT 36.9 2020 03:27 PM    HCT 39.6 2020 01:54 PM    PLATELET 055 (L)  07:25 PM    PLATELET 432 2864 01:10 PM    PLATELET 923  03:27 PM    PLATELET 833  01:54 PM    Hgb, External 11.6 2020    Hgb, External 12.6 2020    Hct, External 33.9 09/21/2020    Hct, External 36.9 06/03/2020    Platelet cnt., External 175 09/21/2020    Platelet cnt., External 192 06/03/2020       No results found for this or any previous visit (from the past 24 hour(s)).

## 2020-12-08 NOTE — ROUTINE PROCESS
1200:Bedside and Verbal shift change report given to FLIP Hernandez (oncoming nurse) by DEBBIE Laurent (offgoing nurse). Report included the following information SBAR.     1793: I have reviewed discharge instructions with the patient. The patient verbalized understanding. All questions answered. Pt being discharged home with . Taken to main entrance for discharge by RN and PCT in wheelchair.

## 2020-12-08 NOTE — L&D DELIVERY NOTE
Delivery Summary    Patient: Nani Jones MRN: [id number]  SSN: [id number]    YOB: 1985  Age: 38 y.o. Sex: female       Information for the patient's :  Amie Mantilla [[id number]]       Labor Events:    Labor: No    Steroids: None   Cervical Ripening Date/Time:       Cervical Ripening Type: Misoprostol   Antibiotics During Labor: No   Rupture Identifier:      Rupture Date/Time: 2020 8:12 AM   Rupture Type: SROM   Amniotic Fluid Volume:      Amniotic Fluid Description: Clear    Amniotic Fluid Odor:      Induction:         Induction Date/Time:        Indications for Induction:      Augmentation: None   Augmentation Date/Time:      Indications for Augmentation:     Labor complications: None       Additional complications:        Delivery Events:  Indications For Episiotomy:     Episiotomy: None   Perineal Laceration(s): 1st   Repaired: Yes   Periurethral Laceration Location:      Repaired:     Labial Laceration Location:     Repaired:     Sulcal Laceration Location:     Repaired:     Vaginal Laceration Location:     Repaired:     Cervical Laceration Location:     Repaired:     Repair Suture: Rapide 3-0   Number of Repair Packets:     Estimated Blood Loss (ml):  ml   Quantitative Blood Loss (ml)                Delivery Date: 2020    Delivery Time: 11:15 AM  Delivery Type: Vaginal, Spontaneous  Sex:  Male    Gestational Age: 38w3d   Delivery Clinician:  Jessica Schwab  Living Status: Living   Delivery Location: L&D L&D 11          APGARS  One minute Five minutes Ten minutes   Skin color: 1   1        Heart rate: 2   2        Grimace: 2   2        Muscle tone: 2   2        Breathin   2        Totals: 9   9            Presentation: Vertex    Position:        Resuscitation Method:  Suctioning-bulb; Tactile Stimulation     Meconium Stained: None      Cord Information: 3 Vessels  Complications: None  Cord around:    Delayed cord clamping?  Yes  Cord clamped date/time:2020 11:18 AM  Disposition of Cord Blood: Discard    Blood Gases Sent?: No    Placenta:  Date/Time: 2020 11:25 AM  Removal: Spontaneous      Appearance: Normal      Measurements:  Birth Weight: 8 lb 0.4 oz (3.64 kg)      Birth Length: 1' 8.5\" (0.521 m)      Head Circumference: 1' 2.37\" (0.365 m)      Chest Circumference:       Abdominal Girth: 1' 1.39\" (0.34 m)    Other Providers:   CHAPITO NUNEZ;LOLA HERZOG;VERONICA HANDLEY, Obstetrician;Primary Nurse;Primary  Nurse           Group B Strep:   Lab Results   Component Value Date/Time    GrBStrep, External negative 2020     Information for the patient's :  Mercedez Section [[id number]]   No results found for: ABORH, PCTABR, PCTDIG, BILI, ABORHEXT, ABORH     No results for input(s): PCO2CB, PO2CB, HCO3I, SO2I, IBD, PTEMPI, SPECTI, PHICB, ISITE, IDEV, IALLEN in the last 72 hours.

## 2020-12-09 ENCOUNTER — PATIENT OUTREACH (OUTPATIENT)
Dept: OTHER | Age: 35
End: 2020-12-09

## 2020-12-09 NOTE — PROGRESS NOTES
ACM attempted to reach patient for Transition of Care/Maternity BIGN call. HIPAA compliant message left requesting a return phone call at patients convenience. Will continue to follow. nformation for the patient's :  Joe Lopez [[id number]]  Delivery of a 8 lb 0.4 oz (3.64 kg) male infant via Vaginal, Spontaneous on 2020 at 55:87 AM  by Susana Aguilera. Apgars were 9  and 9 .

## 2020-12-11 ENCOUNTER — PATIENT OUTREACH (OUTPATIENT)
Dept: OTHER | Age: 35
End: 2020-12-11

## 2020-12-11 NOTE — PROGRESS NOTES
Care Transitions Initial Follow Up Call    Call within 2 business days of discharge: Yes     Patient: Radha Harp Patient : 1985 MRN: [id number]    Last Discharge [address]       Complaint Diagnosis Description Type Department Provider    20 Scheduled Induction  Admission (Discharged) Dorcas Caraballo MD          Patient eligible for [address] [place] Manager contacted the patient by telephone to discuss the maternity management program.  Patient agrees to care management services at this time. Verified name and  with patient as identifiers. Risk Factors Identified: venous thrombosis/pcos/multigravida    Needs to be reviewed by the provider   none         Method of communication with provider : none    Does patient have OB/Gyn Selected? Yes    Discussed follow up appointments. If no appointment was previously scheduled, appointment scheduling offered: Yes  [facility] follow up appointment(s): No future appointments. [facility] follow up appointment(s):     OB History:   OB History    Para Term  AB Living   3 3 3     1   SAB TAB Ectopic Molar Multiple Live Births           0 1      # Outcome Date GA Lbr Go/2nd Weight Sex Delivery Anes PTL Lv   3 Term 20 39w3d / 00:09 8 lb 0.4 oz (3.64 kg) M Vag-Spont EPI N NINOSKA   2 Term 17 40w2d  7 lb 9 oz (3.43 kg) F Vag-Spont      1 Term 10/07/14 40w6d  7 lb 1 oz (3.204 kg) F Vag-Spont          Unknown    Medication reconciliation was performed with patient, who verbalizes understanding of administration of home medications. Advised obtaining a 90-day supply of all daily and as-needed medications. Barriers/Support system:  parents and        Postpartum Assessment:  Vaginal, Lochia-light, Fever No, BM?  Yes , Decreased urinary output No, Perineal care-dicussed with pt, Depression or feelings of sadness or overwhelm-there are no concerns for this now but discussed with pt, Feeding schedule-baby is breast feeding q 3h with some cluster feeding in between breast milk has come in and baby has been gaining weight. , Sleep safety and Infant's weight. Mom and baby doing well mom does not work her  works for [employer]. Baby with circumcision and it is healing well. Color looks good  No yellowing reported. Baby and mom have all appointments made. Will continue to follow. Patient given an opportunity to ask questions and does not have any further questions or concerns at this time. Were discharge instructions available to patient? Yes. Reviewed appropriate site of care based on symptoms and resources available to patient including: When to call 911, TELOS Messaging and Condition related references. The patient agrees to contact the OB/Gyn office for questions related to their healthcare. Plan for follow-up call in 10-14 days based on severity of symptoms and risk factors. Plan for next call: self management-postpartum care/ care    Goals Addressed                 This Visit's Progress     Understands red flags post discharge. · Assess patient knowledge of how to contact the provider for questions if needed;  · Educate patient on importance of monitoring for any red flags;  · Review importance of reporting any changes, red flags to the provider and/or PCP;  · Assess patient's knowledge of discharge instructions and any restrictions;  · Educate patient when to call 911;  · Assess for any barriers with safety at home  · Discuss use of nurse access line and location of number on front of insurance card. · Call 911 anytime you think you may need emergency care. For example, call if:  · · You passed out (lost consciousness). · · You have a seizure. · · You have severe vaginal bleeding. Call your doctor now or seek immediate medical care if:  · You have signs of preeclampsia, such as:  ? Sudden swelling of your face, hands, or feet.   ? New vision problems (such as dimness, blurring, or seeing spots). ? A severe headache. · You have any vaginal bleeding. · You have belly pain or cramping. · You have a fever.

## 2020-12-28 ENCOUNTER — PATIENT OUTREACH (OUTPATIENT)
Dept: OTHER | Age: 35
End: 2020-12-28

## 2021-01-11 NOTE — PROGRESS NOTES
Physical Therapy at [facility],   a part of  [facility]  [address] [facility], [address] [place] [address] Drive  Phone: [phone]  Fax: [phone]    Discharge Summary  2-15    Patient name: Navneet Fuller  : 1985  Provider#: [id number]  Referral source: Ramon Bauman MD      Medical/Treatment Diagnosis: Bilateral hip pain [M25.551, M25.552]     Prior Hospitalization: see medical history     Comorbidities: See Plan of Care  Prior Level of Function:See Plan of Care  Medications: Verified on Patient Summary List    Start of Care: 10/14/20      Onset Date: 8w   Visits from Start of Care: 3     Missed Visits: 0  Reporting Period : 10/14/20 - 10/28/20      ASSESSMENT/SUMMARY OF CARE:  Pt referred for evaluation and treatment of LBP/SIJ pain in pregnancy. Pt scheduled for induction 20. Treatment consisted of manual therapy, therapeutic exercise, therapeutic activity, patient education, posture and body mechanics training, modalities, home exercise program development and progression. Mrs Dolly Mireles demonstrated steady improvements with PT efforts. She had signficant relief with manual therapy and use of SEROLA belt.  20, discharged at this time. Short Term Goals: To be accomplished in 4 weeks:  Pt will be independent with a progressive HEP   MET  Pt will demonstrate good posture with sitting, standing, transitional ADLs  MET  Pt will have no pain with palpation through SIJ MET  Pt will be independent with use of SEROLA belt with ADLs  MET  Pt will tolerate standing to cook 20 mins with no increased pain  MET     Long Term Goals: To be accomplished in 8 weeks:  Pt will demonstrate full functional ROM with no increased pain as expected in pregnancy  MET  Pt will have no pain with bed mobility tranfers MET  Pt will have no pain with stair climbing.  MET      RECOMMENDATIONS:  [x]Discontinue therapy: [x]Patient has reached or is progressing toward set goals      []Patient is non-compliant or has abdicated      []Due to lack of appreciable progress towards set goals      [x]Other;  20    Yanet Haji PT, MSPT     2021

## 2021-01-21 ENCOUNTER — OFFICE VISIT (OUTPATIENT)
Dept: OBGYN CLINIC | Age: 36
End: 2021-01-21
Payer: COMMERCIAL

## 2021-01-21 VITALS — SYSTOLIC BLOOD PRESSURE: 122 MMHG | WEIGHT: 171.6 LBS | DIASTOLIC BLOOD PRESSURE: 74 MMHG | BODY MASS INDEX: 26.88 KG/M2

## 2021-01-21 DIAGNOSIS — Z11.51 SPECIAL SCREENING EXAMINATION FOR HUMAN PAPILLOMAVIRUS (HPV): ICD-10-CM

## 2021-01-21 DIAGNOSIS — Z01.419 WELL WOMAN EXAM: Primary | ICD-10-CM

## 2021-01-21 PROBLEM — Z34.80 PRENATAL CARE OF MULTIGRAVIDA, ANTEPARTUM: Status: RESOLVED | Noted: 2020-04-27 | Resolved: 2021-01-21

## 2021-01-21 PROBLEM — Z98.890 STATUS POST INDUCTION OF LABOR: Status: RESOLVED | Noted: 2020-12-07 | Resolved: 2021-01-21

## 2021-01-21 PROBLEM — R51.9 HEADACHE: Status: RESOLVED | Noted: 2020-02-17 | Resolved: 2021-01-21

## 2021-01-21 PROCEDURE — 0503F POSTPARTUM CARE VISIT: CPT | Performed by: OBSTETRICS & GYNECOLOGY

## 2021-01-21 NOTE — PATIENT INSTRUCTIONS
Postpartum: Care Instructions Your Care Instructions After childbirth (postpartum period), your body goes through many changes. Some of these changes happen over several weeks. In the hours after delivery, your body will begin to recover from childbirth while it prepares to breastfeed your . You may feel emotional during this time. Your hormones can shift your mood without warning for no clear reason. In the first couple of weeks after childbirth, many women have emotions that change from happy to sad. You may find it hard to sleep. You may cry a lot. This is called the \"baby blues. \" These overwhelming emotions often go away within a couple of days or weeks. But it's important to discuss your feelings with your doctor. It is easy to get too tired and overwhelmed during the first weeks after childbirth. Don't try to do too much. Get rest whenever you can, accept help from others, and eat well and drink plenty of fluids. In the first couple of weeks after giving birth, your doctor or midwife may want to check in with you and make a plan for any follow-up care you may need. You will likely have a complete postpartum visit in the first 3 months after delivery. At that time, your doctor or midwife will check on your recovery from childbirth. He or she will also see how you are doing with your emotions and talk about your concerns or questions. Follow-up care is a key part of your treatment and safety. Be sure to make and go to all appointments, and call your doctor if you are having problems. It's also a good idea to know your test results and keep a list of the medicines you take. How can you care for yourself at home? · Sleep or rest when your baby sleeps. · Get help with household chores from family or friends, if you can. Do not try to do it all yourself. · If you have hemorrhoids or swelling or pain around the opening of your vagina, try using cold and heat. You can put ice or a cold pack on the area for 10 to 20 minutes at a time. Put a thin cloth between the ice and your skin. Also try sitting in a few inches of warm water (sitz bath) 3 times a day and after bowel movements. · Take pain medicines exactly as directed. ? If the doctor gave you a prescription medicine for pain, take it as prescribed. ? If you are not taking a prescription pain medicine, ask your doctor if you can take an over-the-counter medicine. · Eat more fiber to avoid constipation. Include foods such as whole-grain breads and cereals, raw vegetables, raw and dried fruits, and beans. · Drink plenty of fluids, enough so that your urine is light yellow or clear like water. If you have kidney, heart, or liver disease and have to limit fluids, talk with your doctor before you increase the amount of fluids you drink. · Do not rinse inside your vagina with fluids (douche). · If you have stitches, keep the area clean by pouring or spraying warm water over the area outside your vagina and anus after you use the toilet. · Keep a list of questions to ask your doctor or midwife. Your questions might be about: 
? Changes in your breasts, such as lumps or soreness. ? When to expect your menstrual period to start again. ? What form of birth control is best for you. ? Weight you have put on during the pregnancy. ? Exercise options. ? What foods and drinks are best for you, especially if you are breastfeeding. ? Problems you might be having with breastfeeding. ? When you can have sex. Some women may want to talk about lubricants for the vagina. ? Any feelings of sadness or restlessness that you are having. When should you call for help? Call 911 anytime you think you may need emergency care. For example, call if: 
  · You have thoughts of harming yourself, your baby, or another person.   · You passed out (lost consciousness).  
  · You have chest pain, are short of breath, or cough up blood.  
  · You have a seizure. Call your doctor now or seek immediate medical care if: 
  · Your vaginal bleeding seems to be getting heavier.  
  · You are dizzy or lightheaded, or you feel like you may faint.  
  · You have a fever.  
  · You have new or more belly pain.  
  · You have symptoms of a blood clot in your leg (called a deep vein thrombosis), such as: 
? Pain in the calf, back of the knee, thigh, or groin. ? Redness and swelling in your leg or groin.  
  · You have signs of preeclampsia, such as: 
? Sudden swelling of your face, hands, or feet. ? New vision problems (such as dimness, blurring, or seeing spots). ? A severe headache. Watch closely for changes in your health, and be sure to contact your doctor if: 
  · You have new or worse vaginal discharge.  
  · You feel sad or depressed.  
  · You are having problems with your breasts or breastfeeding. Where can you learn more? Go to http://www.gray.com/ Enter I418 in the search box to learn more about \"Postpartum: Care Instructions. \" Current as of: February 11, 2020               Content Version: 12.6 © 5007-8857 [employer]. Care instructions adapted under license by "[employer] (which disclaims liability or warranty for this information). If you have questions about a medical condition or this instruction, always ask your healthcare professional. [place] [address] any warranty or liability for your use of this information.

## 2021-01-21 NOTE — PROGRESS NOTES
Postpartum evaluation    Riley Schilling is a 38 y.o. female who presents for a postpartum exam.     She is now 6 weeks post normal spontaneous vaginal delivery. Her baby is doing well. She has had no menses since delivery. She has had the following significant problems since her delivery: none    The patient is breast feeding without difficulty. The patient would like to use vasectomy for birth control. Reviewed VCF     She is currently taking: no medications. She is due for her next AE today. Pt states she scored a 5 on the [place] depression scale yesterday at the pediatricians office. Pt states she has a therapist that she is able to see virtually. Pt also given several names of therapits in [place]. There were no vitals taken for this visit.     PHYSICAL EXAMINATION    Constitutional  · Appearance: well-nourished, well developed, alert, in no acute distress    HENT  · Head and Face: appears normal    Gastrointestinal  · Abdominal Examination: abdomen non-tender to palpation, normal bowel sounds, no masses present  · Liver and spleen: no hepatomegaly present, spleen not palpable  · Hernias: no hernias identified    Genitourinary  · External Genitalia: normal appearance for age, no discharge present, no tenderness present, no inflammatory lesions present, no masses present, no atrophy present  · Vagina: normal vaginal vault without central or paravaginal defects, no discharge present, no inflammatory lesions present, no masses present  · Bladder: non-tender to palpation  · Urethra: appears normal  · Cervix: normal   · Uterus: normal size, shape and consistency  · Adnexa: no adnexal tenderness present, no adnexal masses present  · Perineum: perineum within normal limits, no evidence of trauma, no rashes or skin lesions present  · Anus: anus within normal limits, no hemorrhoids present  · Inguinal Lymph Nodes: no lymphadenopathy present    Skin  · General Inspection: no rash, no lesions identified    Neurologic/Psychiatric  · Mental Status:  · Orientation: grossly oriented to person, place and time  · Mood and Affect: mood normal, affect appropriate    Assessment:  Normal postpartum check    Plan:  Patient declines presence of chaperone during today's visit. RTO for AE.

## 2021-01-23 LAB
CYTOLOGIST CVX/VAG CYTO: NORMAL
CYTOLOGY CVX/VAG DOC CYTO: NORMAL
CYTOLOGY CVX/VAG DOC THIN PREP: NORMAL
DX ICD CODE: NORMAL
LABCORP, 190119: NORMAL
Lab: NORMAL
OTHER STN SPEC: NORMAL
STAT OF ADQ CVX/VAG CYTO-IMP: NORMAL

## 2021-01-25 ENCOUNTER — TELEPHONE (OUTPATIENT)
Dept: OBGYN CLINIC | Age: 36
End: 2021-01-25

## 2021-01-25 NOTE — TELEPHONE ENCOUNTER
Message left at 9:27am      38year old patient last seen in the office on 1/21/2021    [facility] pharmacy calling about prescription number [id number] and the PA sent to the office for      enoxaparin (LOVENOX) 40 mg/0.4 mL     This nurse called the pharmacy back to advise that the medication has been discontinued. Pharmacy verbalized understanding.

## 2021-01-28 ENCOUNTER — TELEPHONE (OUTPATIENT)
Dept: INTERNAL MEDICINE CLINIC | Age: 36
End: 2021-01-28

## 2021-01-28 DIAGNOSIS — Z00.00 WELL ADULT EXAM: Primary | ICD-10-CM

## 2021-01-28 DIAGNOSIS — D69.6 THROMBOCYTOPENIA (HCC): ICD-10-CM

## 2021-01-28 NOTE — TELEPHONE ENCOUNTER
Labs ordered for our office and can be done one week prior. Her platelets were low when last checked.   If they're low again, she may need to get another set of labs when she comes for her physical

## 2021-01-28 NOTE — TELEPHONE ENCOUNTER
----- Message from Ferny Rdz sent at 1/28/2021  2:07 PM EST -----  Regarding: Dr. Hector Peters Message/Vendor Calls    Caller's first and last name:  Javier Childers      Reason for call:  Pt requesting to schedule fasting lab work one week prior to Complete Physical scheduled on 3/22/21. Callback required yes/no and why:  Yes.   Pt would like to confirm she can come in for fasting lab work one week prior to Complete Physical.      Best contact number(s):  [phone]      Details to clarify the request:      Ferny Rdz

## 2021-02-15 RX ORDER — SERTRALINE HYDROCHLORIDE 25 MG/1
25 TABLET, FILM COATED ORAL DAILY
Qty: 30 TAB | Refills: 12 | Status: SHIPPED | OUTPATIENT
Start: 2021-02-15 | End: 2021-03-22

## 2021-02-16 ENCOUNTER — PATIENT OUTREACH (OUTPATIENT)
Dept: OTHER | Age: 36
End: 2021-02-16

## 2021-02-16 NOTE — PATIENT INSTRUCTIONS
Depression After Childbirth: Care Instructions Your Care Instructions Many women get the \"baby blues\" during the first few days after childbirth. You may lose sleep, feel irritable, and cry easily. You may feel happy one minute and sad the next. Hormone changes are one cause of these emotional changes. Also, the demands of a new baby, along with visits from relatives or other family needs, add to a mother's stress. The \"baby blues\" often peak around the fourth day. Then they ease up in less than 2 weeks. If your moodiness or anxiety lasts for more than 2 weeks, or if you feel like life is not worth living, you may have postpartum depression. This is different for each mother. Some mothers with serious depression may worry intensely about their infant's well-being. Others may feel distant from their child. Some mothers might even feel that they might harm their baby. A mother may have signs of paranoia, wondering if someone is watching her. Depression is not a sign of weakness. It is a medical condition that requires treatment. Medicine and counseling often work well to reduce depression. Talk to your doctor about taking antidepressant medicine while breastfeeding. Follow-up care is a key part of your treatment and safety. Be sure to make and go to all appointments, and call your doctor if you are having problems. It's also a good idea to know your test results and keep a list of the medicines you take. How do you know if you are depressed? With all the changes in your life, you may not know if you are depressed. Pregnancy sometimes causes changes in how you feel that are similar to the symptoms of depression. Symptoms of depression include: · Feeling sad or hopeless and losing interest in daily activities. These are the most common symptoms of depression. · Sleeping too much or not enough. · Feeling tired. You may feel as if you have no energy. · Eating too much or too little. · Writing or talking about death, such as writing suicide notes or talking about guns, knives, or pills. Keep the numbers for these national suicide hotlines: 2-518-545-TALK (4-890.223.7169) and 3-728-MJHWZJF (3-760.249.3958). If you or someone you know talks about suicide or feeling hopeless, get help right away. How can you care for yourself at home? · Be safe with medicines. Take your medicines exactly as prescribed. Call your doctor if you think you are having a problem with your medicine. · Eat a healthy diet so that you can keep up your energy. · Get regular daily exercise, such as walks, to help improve your mood. · Get as much sunlight as possible. Keep your shades and curtains open. Get outside as much as you can. · Avoid using alcohol or other substances to feel better. · Get as much rest and sleep as possible. Avoid doing too much. Being too tired can increase depression. · Play stimulating music throughout your day and soothing music at night. · Schedule outings and visits with friends and family. Ask them to call you regularly, so that you do not feel alone. · Ask for help with preparing food and other daily tasks. Family and friends are often happy to help a mother with a . · Be honest with yourself and those who care about you. Tell them about your struggle. · Join a support group of new mothers. No one can better understand the challenges of caring for a  than other new mothers. · If you feel like life is not worth living or are feeling hopeless, get help right away. Keep the numbers for these national suicide hotlines: -TALK (1-491-178-618.356.6706) and 2-769-BJSXBDM (0-119.908.5158). When should you call for help? Call 911 anytime you think you may need emergency care. For example, call if: 
  · You feel you cannot stop from hurting yourself, your baby, or someone else. Call your doctor now or seek immediate medical care if:   · You are having trouble caring for yourself or your baby.  
  · You hear voices. Watch closely for changes in your health, and be sure to contact your doctor if: 
  · You have problems with your depression medicine.  
  · You do not get better as expected. Where can you learn more? Go to http://www.gray.com/ Enter B670 in the search box to learn more about \"Depression After Childbirth: Care Instructions. \" Current as of: January 31, 2020               Content Version: 12.6 © 3465-2663 [employer]. Care instructions adapted under license by [employer] (which disclaims liability or warranty for this information). If you have questions about a medical condition or this instruction, always ask your healthcare professional. [address] any warranty or liability for your use of this information.

## 2021-02-16 NOTE — PROGRESS NOTES
F/U BING call. S/W pt today and she was very happy to hear from me. She and baby are doing well so far, no medical concerns. Pt does not work and is at home on her own most of the time, her  works for the hospital as a physician. Pt mentions on this call she contacted her MD to get a prescription for zoloft. Pt is having some feelings of being overwhelmed, she does not have any thoughts of harming herself or the baby. Pt also mentions starting therapy sessions just last week and has another one this week. I mentioned [facility] to her as well for possible  needs. Discussed and encouraged multiple ways to try to manage overwhelming feelings, also discussed possible psychiatry referral if symptoms continue. Pt had no additional questions or concerns. Will continue to monitor. Goals      Completes all follow-up appointments within 30 days       Educate and encourage importance of FU for prevention of complications or disease;   Assess the patient's relationship with a PCP and next FU visit scheduled;   Discuss importance of adherence to treatment plan and follow up visits;   Identify any barriers in transportation or access to FU appointments.  Assist patient with making FU appointments as needed;    It's also a good idea to know your test results. Keep a list of the medicines you take       Understands red flags post discharge. · Assess patient knowledge of how to contact the provider for questions if needed;  · Educate patient on importance of monitoring for any red flags;  · Review importance of reporting any changes, red flags to the provider and/or PCP;  · Assess patient's knowledge of discharge instructions and any restrictions;  · Educate patient when to call 911;  · Assess for any barriers with safety at home  · Discuss use of nurse access line and location of number on front of insurance card. · Call 911 anytime you think you may need emergency care.  For example, call if: · · You passed out (lost consciousness). · · You have a seizure. · · You have severe vaginal bleeding. Call your doctor now or seek immediate medical care if:  · You have signs of preeclampsia, such as:  ? Sudden swelling of your face, hands, or feet. ? New vision problems (such as dimness, blurring, or seeing spots). ? A severe headache. · You have any vaginal bleeding. · You have belly pain or cramping. · You have a fever.

## 2021-02-25 ENCOUNTER — PATIENT OUTREACH (OUTPATIENT)
Dept: OTHER | Age: 36
End: 2021-02-25

## 2021-02-26 NOTE — PROGRESS NOTES
ACM attempted to reach patient for Transition of Care call. HIPAA compliant message left requesting a return phone call at patients convenience. Will continue to follow. Resolving current episode (Transitions of care complete). No further ED/UC or hospital admissions within 30 days post discharge. Patient attended follow-up appointments as directed. No outreach from patient to [address] [place] [address].

## 2021-03-17 ENCOUNTER — TELEPHONE (OUTPATIENT)
Dept: INTERNAL MEDICINE CLINIC | Age: 36
End: 2021-03-17

## 2021-03-17 NOTE — TELEPHONE ENCOUNTER
----- Message from Tony Hays sent at 3/17/2021  1:31 PM EDT -----  Regarding: Dr. Weinstein Anchors: [phone]  General Message/Vendor Calls    Caller's first and last name: Pt.       Reason for call: Has question about blood work for appointment next week. Callback required yes/no and why: Yes. Would like call back from nurse. Best contact number(s): [phone]      Details to clarify the request: N/a.       Tony Hays

## 2021-03-18 NOTE — TELEPHONE ENCOUNTER
Return call made ; no answer--LVM to advise for a call back with any other questions or concerns regarding labs and CPE next week.

## 2021-03-21 NOTE — PROGRESS NOTES
Assessment and Plan   Diagnoses and all orders for this visit:    1. Routine adult health maintenance  -     HEMOGLOBIN A1C WITH EAG; Future  -     METABOLIC PANEL, COMPREHENSIVE; Future  -     LIPID PANEL; Future  -     HEPATITIS C AB; Future  Son born in December. Up-to-date on vaccines and Pap smear. Plan to start mammo at age 35 given mother's history of breast cancer at age 39.    2. Thrombocytopenia ([address]  -     CBC WITH AUTOMATED DIFF; Future  Noted in December during her delivery. Reports she was on heparin at this time. Recheck to ensure resolution. If still persistent, consider further work-up. 3. Attention deficit hyperactivity disorder (ADHD), predominantly inattentive type  -     methylphenidate ER 18 mg 24 hr tab; Take 1 Tab by mouth every morning. Max Daily Amount: 18 mg. Reports feeling overwhelmed, frustrated, and angry. Thinks Covid is contributingnot able to get out as much or have as much help. She has a 1month-old and a toddler who require a lot of care. Her 10year-old is also home doing zoom school who needs help.  is busy with work. Denies feeling particularly depressed. Had been previously followed by a psychiatrist in high school for depression who subsequently diagnosed her with ADHD and started her on Concerta in college. Was given Zoloft by her OB/GYN but she is hesitant to start. Recommend starting Concerta to see if that helps. Thinks she may have been on 36 mg.  Recommend starting low when we will increase as needed. If not, would consider SSRI. Advised to see me a message in 2 weeks to let me know how its going. Benefits, risks, possible drug interactions, and side effects of all new medications were reviewed with the patient. Pt verbalized understanding.     Return to clinic: 3 months for medication follow-up, advised patient to send me a message in 2 weeks for medication update    An electronic signature was used to authenticate this note.  Franchesca Bazan MD  [facility]  3/22/2021    Future Appointments   Date Time Provider Chris Ann   7/91/3991  3:65 PM Delma Kerns MD [facility] BS AMB        History of Present Illness   Chief Complaint   Physical    Abe Eng is a 38 y.o. female         Review of Systems   Constitutional: Negative for chills and fever. HENT: Negative for hearing loss. Eyes: Negative for blurred vision. Respiratory: Negative for shortness of breath. Cardiovascular: Negative for chest pain. Gastrointestinal: Negative for abdominal pain, blood in stool, constipation, diarrhea, melena, nausea and vomiting. Genitourinary: Negative for dysuria and hematuria. Musculoskeletal: Negative for joint pain. Skin: Negative for rash. Neurological: Negative for headaches. Past Medical History   No Known Allergies     Current Outpatient Medications   Medication Sig    methylphenidate ER 18 mg 24 hr tab Take 1 Tab by mouth every morning. Max Daily Amount: 18 mg.  PNV No12-Iron-FA-DSS-OM-3 29 mg iron-1 mg -50 mg CPKD Take  by mouth. No current facility-administered medications for this visit.            Patient Active Problem List   Diagnosis Code    PCOS (polycystic ovarian syndrome) E28.2    Venous thrombosis I82.90    Thrombocytopenia ([facility] [address] D69.6    Attention deficit hyperactivity disorder (ADHD), predominantly inattentive type F90.0    Anxiety F41.9    Family history of breast cancer Z80.3    History of depression Z86.59     Past Surgical History:   Procedure Laterality Date    HX HYSTEROSCOPY  02/28/2020    Removal of imbedded IUD arm    HX OTHER SURGICAL      Sinus venous thrombosis      Social History     Tobacco Use    Smoking status: Never Smoker    Smokeless tobacco: Never Used   Substance Use Topics    Alcohol use: Not Currently     Comment: 2-4 drinks/week      Family History   Problem Relation Age of Onset    Breast Cancer Mother 39    High Cholesterol Mother     Parkinson's Disease Father         Physical Exam   Vitals:       Visit Vitals  /70   Pulse (!) 53   Temp 97.8 °F (36.6 °C) (Oral)   Resp 20   Ht 5' 7\" (1.702 m)   Wt 162 lb (73.5 kg)   SpO2 100%   BMI 25.37 kg/m²        Physical Exam  Constitutional:       General: She is not in acute distress. Appearance: She is well-developed. HENT:      Right Ear: Tympanic membrane, ear canal and external ear normal.      Left Ear: Tympanic membrane, ear canal and external ear normal.   Eyes:      Extraocular Movements: Extraocular movements intact. Conjunctiva/sclera: Conjunctivae normal.   Neck:      Musculoskeletal: Neck supple. Cardiovascular:      Rate and Rhythm: Normal rate and regular rhythm. Pulses: Normal pulses. Heart sounds: No murmur. No friction rub. No gallop. Pulmonary:      Effort: No respiratory distress. Breath sounds: No wheezing, rhonchi or rales. Abdominal:      General: Bowel sounds are normal. There is no distension. Palpations: Abdomen is soft. There is no hepatomegaly, splenomegaly or mass. Tenderness: There is no abdominal tenderness. There is no guarding. Skin:     General: Skin is warm. Findings: No rash. Neurological:      Mental Status: She is alert.

## 2021-03-22 ENCOUNTER — TELEPHONE (OUTPATIENT)
Dept: INTERNAL MEDICINE CLINIC | Age: 36
End: 2021-03-22

## 2021-03-22 ENCOUNTER — OFFICE VISIT (OUTPATIENT)
Dept: INTERNAL MEDICINE CLINIC | Age: 36
End: 2021-03-22
Payer: COMMERCIAL

## 2021-03-22 VITALS
DIASTOLIC BLOOD PRESSURE: 70 MMHG | HEIGHT: 67 IN | HEART RATE: 53 BPM | WEIGHT: 162 LBS | BODY MASS INDEX: 25.43 KG/M2 | RESPIRATION RATE: 20 BRPM | SYSTOLIC BLOOD PRESSURE: 106 MMHG | TEMPERATURE: 97.8 F | OXYGEN SATURATION: 100 %

## 2021-03-22 DIAGNOSIS — F90.0 ATTENTION DEFICIT HYPERACTIVITY DISORDER (ADHD), PREDOMINANTLY INATTENTIVE TYPE: ICD-10-CM

## 2021-03-22 DIAGNOSIS — Z00.00 ROUTINE ADULT HEALTH MAINTENANCE: Primary | ICD-10-CM

## 2021-03-22 DIAGNOSIS — D69.6 THROMBOCYTOPENIA (HCC): ICD-10-CM

## 2021-03-22 PROBLEM — Z80.3 FAMILY HISTORY OF BREAST CANCER: Status: ACTIVE | Noted: 2021-03-22

## 2021-03-22 PROBLEM — F41.9 ANXIETY: Status: ACTIVE | Noted: 2021-03-22

## 2021-03-22 PROCEDURE — 99395 PREV VISIT EST AGE 18-39: CPT | Performed by: INTERNAL MEDICINE

## 2021-03-22 PROCEDURE — 99214 OFFICE O/P EST MOD 30 MIN: CPT | Performed by: INTERNAL MEDICINE

## 2021-03-22 RX ORDER — METHYLPHENIDATE HYDROCHLORIDE 18 MG/1
18 TABLET ORAL
Qty: 30 TAB | Refills: 0 | Status: SHIPPED | OUTPATIENT
Start: 2021-03-22 | End: 2021-04-14 | Stop reason: SDUPTHER

## 2021-03-22 NOTE — TELEPHONE ENCOUNTER
Call made to patient to advise per Dr. Velma Rivera hold off on starting aspirin until platelet levels come back . Advised  if her plts are very low, we shouldn't use aspirin. Patient verbalized understanding and was thankful for the call.

## 2021-03-23 ENCOUNTER — TELEPHONE (OUTPATIENT)
Dept: INTERNAL MEDICINE CLINIC | Age: 36
End: 2021-03-23

## 2021-03-23 NOTE — TELEPHONE ENCOUNTER
Incoming fax from pharmacy: medication methylphenidate 18mg is not covered by plan. Please send in another medication.

## 2021-03-24 NOTE — TELEPHONE ENCOUNTER
Call made to patient ; no answer--LVM to advise of providers message regarding having her pharmacy to run her regular insurance to get medication covered. Advise for a call back for any questions or concerns.

## 2021-03-24 NOTE — TELEPHONE ENCOUNTER
Sometimes the pharmacy fax will include a  list of alternative medications sometimes the message will be just to complete a PA.

## 2021-03-24 NOTE — TELEPHONE ENCOUNTER
She is a [email or web address] employee. She needs to bring her regular health insurance card to her pharmacy so they can run with her regular insurance.

## 2021-03-24 NOTE — TELEPHONE ENCOUNTER
Call made to patient insurance. Per prior auth representative patient insurance does not cover medication at all or offers to complete a PA. Representative looked into Vyvanse, adderall and dextroamephetamine to see if that was covered under patient plan. Stated that none are covered and none offers to complete a PA. Informed me that patient has  a contraceptive only benefit plan.

## 2021-03-24 NOTE — TELEPHONE ENCOUNTER
I can try to complete a PA for this medication to see if we can get it approved that way. I might need to call her insurance as well.

## 2021-04-02 LAB
ALBUMIN SERPL-MCNC: 4.2 G/DL (ref 3.5–5)
ALBUMIN/GLOB SERPL: 1.2 {RATIO} (ref 1.1–2.2)
ALP SERPL-CCNC: 69 U/L (ref 45–117)
ALT SERPL-CCNC: 23 U/L (ref 12–78)
ANION GAP SERPL CALC-SCNC: 6 MMOL/L (ref 5–15)
AST SERPL-CCNC: 14 U/L (ref 15–37)
BASOPHILS # BLD: 0 K/UL (ref 0–0.1)
BASOPHILS NFR BLD: 1 % (ref 0–1)
BILIRUB SERPL-MCNC: 0.3 MG/DL (ref 0.2–1)
BUN SERPL-MCNC: 20 MG/DL (ref 6–20)
BUN/CREAT SERPL: 28 (ref 12–20)
CALCIUM SERPL-MCNC: 9.6 MG/DL (ref 8.5–10.1)
CHLORIDE SERPL-SCNC: 104 MMOL/L (ref 97–108)
CHOLEST SERPL-MCNC: 185 MG/DL
CO2 SERPL-SCNC: 30 MMOL/L (ref 21–32)
CREAT SERPL-MCNC: 0.72 MG/DL (ref 0.55–1.02)
DIFFERENTIAL METHOD BLD: NORMAL
EOSINOPHIL # BLD: 0.1 K/UL (ref 0–0.4)
EOSINOPHIL NFR BLD: 2 % (ref 0–7)
ERYTHROCYTE [DISTWIDTH] IN BLOOD BY AUTOMATED COUNT: 12.2 % (ref 11.5–14.5)
EST. AVERAGE GLUCOSE BLD GHB EST-MCNC: 120 MG/DL
GLOBULIN SER CALC-MCNC: 3.4 G/DL (ref 2–4)
GLUCOSE SERPL-MCNC: 88 MG/DL (ref 65–100)
HBA1C MFR BLD: 5.8 % (ref 4–5.6)
HCT VFR BLD AUTO: 43.1 % (ref 35–47)
HCV AB SERPL QL IA: NONREACTIVE
HCV COMMENT,HCGAC: NORMAL
HDLC SERPL-MCNC: 63 MG/DL
HDLC SERPL: 2.9 {RATIO} (ref 0–5)
HGB BLD-MCNC: 13.4 G/DL (ref 11.5–16)
IMM GRANULOCYTES # BLD AUTO: 0 K/UL (ref 0–0.04)
IMM GRANULOCYTES NFR BLD AUTO: 0 % (ref 0–0.5)
LDLC SERPL CALC-MCNC: 109.2 MG/DL (ref 0–100)
LIPID PROFILE,FLP: ABNORMAL
LYMPHOCYTES # BLD: 2.7 K/UL (ref 0.8–3.5)
LYMPHOCYTES NFR BLD: 49 % (ref 12–49)
MCH RBC QN AUTO: 29.3 PG (ref 26–34)
MCHC RBC AUTO-ENTMCNC: 31.1 G/DL (ref 30–36.5)
MCV RBC AUTO: 94.3 FL (ref 80–99)
MONOCYTES # BLD: 0.5 K/UL (ref 0–1)
MONOCYTES NFR BLD: 8 % (ref 5–13)
NEUTS SEG # BLD: 2.2 K/UL (ref 1.8–8)
NEUTS SEG NFR BLD: 39 % (ref 32–75)
NRBC # BLD: 0 K/UL (ref 0–0.01)
NRBC BLD-RTO: 0 PER 100 WBC
PLATELET # BLD AUTO: 178 K/UL (ref 150–400)
POTASSIUM SERPL-SCNC: 4.2 MMOL/L (ref 3.5–5.1)
PROT SERPL-MCNC: 7.6 G/DL (ref 6.4–8.2)
RBC # BLD AUTO: 4.57 M/UL (ref 3.8–5.2)
SODIUM SERPL-SCNC: 140 MMOL/L (ref 136–145)
TRIGL SERPL-MCNC: 64 MG/DL (ref ?–150)
VLDLC SERPL CALC-MCNC: 12.8 MG/DL
WBC # BLD AUTO: 5.5 K/UL (ref 3.6–11)

## 2021-04-05 NOTE — PROGRESS NOTES
[employer] message sent. Hepatitis C negative. . CMP normal.  A1c 5.8. CBC normal.  Platelet 320 from 197.  =  Your test results are normal except for the following: Your cholesterol is barely elevated. You do not need medications for this. The best way to manage your cholesterol is through diet and exercise. The Mediterranean diet is a good guide to healthy eating. Your hemoglobin A1c, a diabetes test, showed that you are in the pre-diabetic range. You can decrease your risk of progressing to diabetes through diet changes and exercise. Your platelets are normal.  Your low platelets more likely related to your delivery and medications at that time. We will continue to monitor.

## 2021-04-14 DIAGNOSIS — F90.0 ATTENTION DEFICIT HYPERACTIVITY DISORDER (ADHD), PREDOMINANTLY INATTENTIVE TYPE: ICD-10-CM

## 2021-04-14 RX ORDER — METHYLPHENIDATE HYDROCHLORIDE 18 MG/1
18 TABLET ORAL
Qty: 31 TAB | Refills: 0 | Status: SHIPPED | OUTPATIENT
Start: 2021-04-24 | End: 2022-08-19

## 2021-04-14 RX ORDER — METHYLPHENIDATE HYDROCHLORIDE 18 MG/1
18 TABLET ORAL
Qty: 31 TAB | Refills: 0 | Status: SHIPPED | OUTPATIENT
Start: 2021-05-24 | End: 2021-06-24

## 2021-04-29 ENCOUNTER — OFFICE VISIT (OUTPATIENT)
Dept: INTERNAL MEDICINE CLINIC | Age: 36
End: 2021-04-29
Payer: COMMERCIAL

## 2021-04-29 VITALS
BODY MASS INDEX: 24.99 KG/M2 | DIASTOLIC BLOOD PRESSURE: 79 MMHG | SYSTOLIC BLOOD PRESSURE: 124 MMHG | WEIGHT: 159.2 LBS | HEIGHT: 67 IN | RESPIRATION RATE: 16 BRPM | TEMPERATURE: 97.8 F

## 2021-04-29 DIAGNOSIS — Z78.9 DISCOMFORT: Primary | ICD-10-CM

## 2021-04-29 DIAGNOSIS — F90.0 ATTENTION DEFICIT HYPERACTIVITY DISORDER (ADHD), PREDOMINANTLY INATTENTIVE TYPE: ICD-10-CM

## 2021-04-29 PROCEDURE — 99213 OFFICE O/P EST LOW 20 MIN: CPT | Performed by: INTERNAL MEDICINE

## 2021-04-29 NOTE — PROGRESS NOTES
Assessment and Plan   Diagnoses and all orders for this visit:    1. Discomfort  At the J&J vaccine 3/11. A few weeks after the back, she started noticing a \"weird sensation\" in her legs mostly in the right but also on the left. Occasionally has tender points near her shin. Had a bruise on her leg. No new activities. Feels like intermittent \"sensations. \"  Denies numbness, tingling, sharp pain. No lower extremity edema. Normal sensation. Good cap refill. Unclear etiology of symptoms. Recommend monitoring. If symptoms change or worsen, let me know. Low clinical concern for DVT at this time so will defer work-up. Advised on symptoms for DVT. If no improvement, consider CBC and Dopplers. She does have a history of thrombocytopenia in the setting of heparin after her pregnancy. Also has a history of venous thrombosis. ADHD  Methylphenidate started last visit. Working well for her. Denies any side effects. No medication changes recommended. Benefits, risks, possible drug interactions, and side effects of all new medications were reviewed with the patient. Pt verbalized understanding. Return to clinic: As scheduled    An electronic signature was used to authenticate this note. Charly Gaitan MD  [facility]  4/29/2021    Future Appointments   Date Time Provider Chris Ann   5/99/5774  6:97 PM Obdulia Villavicencio MD [facility] BS AMB        Subjective   Chief Complaint       Justin Greco is a 38 y.o. female           Objective   Vitals:       Visit Vitals  /79 (BP 1 Location: Left upper arm, BP Patient Position: Sitting, BP Cuff Size: Adult)   Temp 97.8 °F (36.6 °C) (Oral)   Resp 16   Ht 5' 7\" (1.702 m)   Wt 159 lb 3.2 oz (72.2 kg)   LMP 04/29/2021   Breastfeeding Yes   BMI 24.93 kg/m²        Physical Exam     Current Outpatient Medications   Medication Sig    methylphenidate ER 18 mg 24 hr tab Take 1 Tab by mouth every morning.  Max Daily Amount: 18 mg.    [START ON 5/24/2021] methylphenidate ER 18 mg 24 hr tab Take 1 Tab by mouth every morning. Max Daily Amount: 18 mg.  PNV No12-Iron-FA-DSS-OM-3 29 mg iron-1 mg -50 mg CPKD Take  by mouth. No current facility-administered medications for this visit.

## 2021-06-24 ENCOUNTER — OFFICE VISIT (OUTPATIENT)
Dept: INTERNAL MEDICINE CLINIC | Age: 36
End: 2021-06-24
Payer: COMMERCIAL

## 2021-06-24 VITALS
WEIGHT: 156.2 LBS | HEIGHT: 67 IN | DIASTOLIC BLOOD PRESSURE: 68 MMHG | BODY MASS INDEX: 24.52 KG/M2 | SYSTOLIC BLOOD PRESSURE: 104 MMHG | RESPIRATION RATE: 14 BRPM | TEMPERATURE: 97.6 F | HEART RATE: 62 BPM | OXYGEN SATURATION: 97 %

## 2021-06-24 DIAGNOSIS — F90.0 ATTENTION DEFICIT HYPERACTIVITY DISORDER (ADHD), PREDOMINANTLY INATTENTIVE TYPE: ICD-10-CM

## 2021-06-24 DIAGNOSIS — F33.2 SEVERE EPISODE OF RECURRENT MAJOR DEPRESSIVE DISORDER, WITHOUT PSYCHOTIC FEATURES (HCC): Primary | ICD-10-CM

## 2021-06-24 PROBLEM — Z00.00 ROUTINE ADULT HEALTH MAINTENANCE: Status: ACTIVE | Noted: 2021-06-24

## 2021-06-24 PROCEDURE — 99214 OFFICE O/P EST MOD 30 MIN: CPT | Performed by: INTERNAL MEDICINE

## 2021-06-24 RX ORDER — DULOXETIN HYDROCHLORIDE 30 MG/1
30 CAPSULE, DELAYED RELEASE ORAL DAILY
Qty: 30 CAPSULE | Refills: 1 | Status: SHIPPED | OUTPATIENT
Start: 2021-06-24 | End: 2021-07-21 | Stop reason: SDUPTHER

## 2021-06-24 RX ORDER — METHYLPHENIDATE HYDROCHLORIDE 18 MG/1
18 TABLET ORAL
Qty: 31 TABLET | Refills: 0 | Status: SHIPPED | OUTPATIENT
Start: 2021-07-24 | End: 2022-08-19

## 2021-06-24 RX ORDER — METHYLPHENIDATE HYDROCHLORIDE 18 MG/1
18 TABLET ORAL
Qty: 31 TABLET | Refills: 0 | Status: SHIPPED | OUTPATIENT
Start: 2021-06-24 | End: 2022-02-08 | Stop reason: SDUPTHER

## 2021-06-24 RX ORDER — METHYLPHENIDATE HYDROCHLORIDE 18 MG/1
18 TABLET ORAL
Qty: 31 TABLET | Refills: 0 | Status: SHIPPED | OUTPATIENT
Start: 2021-08-23 | End: 2022-08-19

## 2021-06-24 NOTE — PROGRESS NOTES
Note   Chief Complaint   depression    Mary Agrawal is a 39 y.o. female     Accompanied by her     1. Severe episode of recurrent major depressive disorder, without psychotic features [facility])  Assessment & Plan:  Last visit:  Reports feeling overwhelmed, frustrated, and angry. Thinks Covid is contributing-not able to get out as much or have as much help. She has a 1month-old and a toddler who require a lot of care. Her 10year-old is also home doing zoom school who needs help.  is busy with work. Denies feeling particularly depressed. Had been previously followed by a psychiatrist in high school for depression who subsequently diagnosed her with ADHD and started her on Concerta in college.     Was given Zoloft by her OB/GYN but she is hesitant to start. Recommend starting Concerta to see if that helps. Thinks she may have been on 36 mg.  Recommend starting low when we will increase as needed. If not, would consider SSRI. Advised to see me a message in 2 weeks to let me know how its going.  ===  Reports severely worsening symptoms since last visit. Had a panic attack up a couple nights ago. Reports a lot of situational stressors including family issues. Reports suicidal ideation but denies any plan or intent. She started the Zoloft that was given by her OB/GYN but 2 weeks later developed a morbilliform rash (had a picture of it on her phone-looks consistent with a drug rash). Discussed various options including SNRIs, Trintellix, Viibryd. Recommend avoiding SSRIs given what looks like a drug rash. She is currently breast-feeding so decided on Cymbalta. Given community resources for more intensive therapy. She is currently getting therapy. Tried [employer] but did not find a good match and is not interested in telehealth. Discussed potential for short inpatient voluntary stay. Orders:  -     DULoxetine (CYMBALTA) 30 mg capsule; Take 1 Capsule by mouth daily.  Indications: depression, anxiety, Normal, Disp-30 Capsule, R-1  2. Attention deficit hyperactivity disorder (ADHD), predominantly inattentive type  Assessment & Plan:  Last visit:  3/22/21  Reports feeling overwhelmed, frustrated, and angry. Thinks Covid is contributing-not able to get out as much or have as much help. She has a 1month-old and a toddler who require a lot of care. Her 10year-old is also home doing zoom school who needs help.  is busy with work. Denies feeling particularly depressed. Had been previously followed by a psychiatrist in high school for depression who subsequently diagnosed her with ADHD and started her on Concerta in college.     Was given Zoloft by her OB/GYN but she is hesitant to start. Recommend starting Concerta to see if that helps. Thinks she may have been on 36 mg.  Recommend starting low when we will increase as needed. If not, would consider SSRI. Advised to see me a message in 2 weeks to let me know how its going. 4/29  Methylphenidate started last visit. Working well for her. Denies any side effects. No medication changes recommended.   =====  Methylphenidate 18 mg working well for her. Continue, no changes recommended  Orders:  -     methylphenidate ER 18 mg 24 hr tab; Take 1 Tablet by mouth every morning. Max Daily Amount: 18 mg., Normal, Disp-31 Tablet, R-0  -     methylphenidate ER 18 mg 24 hr tab; Take 1 Tablet by mouth every morning. Max Daily Amount: 18 mg., Normal, Disp-31 Tablet, R-0  -     methylphenidate ER 18 mg 24 hr tab; Take 1 Tablet by mouth every morning. Max Daily Amount: 18 mg., Normal, Disp-31 Tablet, R-0       Benefits, risks, possible drug interactions, and side effects of all new medications were reviewed with the patient. Pt verbalized understanding. Return to clinic:  1 month for cymbalta    An electronic signature was used to authenticate this note.   Annamarie Triplett MD  [facility]  6/24/2021    Future Appointments   Date Time Provider Chris Ann   1/99/2671 48:99 AM Rebecca Paula MD [facility] BS AMB        Objective   Vitals:       Visit Vitals  /68 (BP 1 Location: Left upper arm, BP Patient Position: Sitting, BP Cuff Size: Adult)   Pulse 62   Temp 97.6 °F (36.4 °C) (Oral)   Resp 14   Ht 5' 7\" (1.702 m)   Wt 156 lb 3.2 oz (70.9 kg)   LMP 06/11/2021   SpO2 97%   BMI 24.46 kg/m²        Physical Exam  Constitutional:       Appearance: Normal appearance. She is not ill-appearing. Cardiovascular:      Rate and Rhythm: Normal rate. Pulmonary:      Effort: No respiratory distress. Neurological:      Mental Status: She is alert. Current Outpatient Medications   Medication Sig    DULoxetine (CYMBALTA) 30 mg capsule Take 1 Capsule by mouth daily. Indications: depression, anxiety    methylphenidate ER 18 mg 24 hr tab Take 1 Tablet by mouth every morning. Max Daily Amount: 18 mg.    [START ON 7/24/2021] methylphenidate ER 18 mg 24 hr tab Take 1 Tablet by mouth every morning. Max Daily Amount: 18 mg.    [START ON 8/23/2021] methylphenidate ER 18 mg 24 hr tab Take 1 Tablet by mouth every morning. Max Daily Amount: 18 mg.    methylphenidate ER 18 mg 24 hr tab Take 1 Tab by mouth every morning. Max Daily Amount: 18 mg.  PNV No12-Iron-FA-DSS-OM-3 29 mg iron-1 mg -50 mg CPKD Take  by mouth. No current facility-administered medications for this visit.

## 2021-06-24 NOTE — ASSESSMENT & PLAN NOTE
Son born in December. Up-to-date on vaccines and Pap smear. Plan to start mammo at age 35 given mother's history of breast cancer at age 39.

## 2021-06-24 NOTE — ASSESSMENT & PLAN NOTE
Last visit:  3/22/21  Reports feeling overwhelmed, frustrated, and angry. Thinks Covid is contributing-not able to get out as much or have as much help. She has a 1month-old and a toddler who require a lot of care. Her 10year-old is also home doing zoom school who needs help.  is busy with work. Denies feeling particularly depressed. Had been previously followed by a psychiatrist in high school for depression who subsequently diagnosed her with ADHD and started her on Concerta in college.     Was given Zoloft by her OB/GYN but she is hesitant to start. Recommend starting Concerta to see if that helps. Thinks she may have been on 36 mg.  Recommend starting low when we will increase as needed. If not, would consider SSRI. Advised to see me a message in 2 weeks to let me know how its going. 4/29  Methylphenidate started last visit. Working well for her. Denies any side effects. No medication changes recommended.   =====  Methylphenidate 18 mg working well for her.   Continue, no changes recommended

## 2021-06-24 NOTE — ASSESSMENT & PLAN NOTE
Last visit:  Reports feeling overwhelmed, frustrated, and angry. Thinks Covid is contributing-not able to get out as much or have as much help. She has a 1month-old and a toddler who require a lot of care. Her 10year-old is also home doing zoom school who needs help.  is busy with work. Denies feeling particularly depressed. Had been previously followed by a psychiatrist in high school for depression who subsequently diagnosed her with ADHD and started her on Concerta in college.     Was given Zoloft by her OB/GYN but she is hesitant to start. Recommend starting Concerta to see if that helps. Thinks she may have been on 36 mg.  Recommend starting low when we will increase as needed. If not, would consider SSRI. Advised to see me a message in 2 weeks to let me know how its going.  ===  Reports severely worsening symptoms since last visit. Had a panic attack up a couple nights ago. Reports a lot of situational stressors including family issues. Reports suicidal ideation but denies any plan or intent. She started the Zoloft that was given by her OB/GYN but 2 weeks later developed a morbilliform rash (had a picture of it on her phone-looks consistent with a drug rash). Discussed various options including SNRIs, Trintellix, Viibryd. Recommend avoiding SSRIs given what looks like a drug rash. She is currently breast-feeding so decided on Cymbalta. Given community resources for more intensive therapy. She is currently getting therapy. Tried [facility] [facility] but did not find a good match and is not interested in telehealth. Discussed potential for short inpatient voluntary stay.

## 2021-07-21 ENCOUNTER — OFFICE VISIT (OUTPATIENT)
Dept: INTERNAL MEDICINE CLINIC | Age: 36
End: 2021-07-21
Payer: COMMERCIAL

## 2021-07-21 VITALS
RESPIRATION RATE: 14 BRPM | HEIGHT: 67 IN | TEMPERATURE: 97.8 F | SYSTOLIC BLOOD PRESSURE: 116 MMHG | HEART RATE: 77 BPM | BODY MASS INDEX: 24.64 KG/M2 | OXYGEN SATURATION: 97 % | WEIGHT: 157 LBS | DIASTOLIC BLOOD PRESSURE: 66 MMHG

## 2021-07-21 DIAGNOSIS — F33.2 SEVERE EPISODE OF RECURRENT MAJOR DEPRESSIVE DISORDER, WITHOUT PSYCHOTIC FEATURES (HCC): ICD-10-CM

## 2021-07-21 PROCEDURE — 99214 OFFICE O/P EST MOD 30 MIN: CPT | Performed by: INTERNAL MEDICINE

## 2021-07-21 RX ORDER — DULOXETIN HYDROCHLORIDE 30 MG/1
30 CAPSULE, DELAYED RELEASE ORAL DAILY
Qty: 90 CAPSULE | Refills: 1 | Status: SHIPPED | OUTPATIENT
Start: 2021-07-21 | End: 2021-11-10

## 2021-07-21 NOTE — PROGRESS NOTES
Note   Chief Complaint   Depression    Basil Aguillon is a 39 y.o. female     1. Severe episode of recurrent major depressive disorder, without psychotic features ([address]  Assessment & Plan:  Significantly improved from last visit. Medication was initially making her tired. Has also noticed difficulty achieving orgasm. Was able to find a therapist since last visit who she feels is a good fit. Continues to have some family stressors but her immediate family stressors are less. PHQ-9 score of 7. Unable to do Trintellix or Viibryd because she is breast-feeding. Given significant improvement in symptoms, recommending continuing Cymbalta 30 mg daily. We discussed potentially adding or substituting with Wellbutrin if sexual dysfunction issues continue to be an issue for her. Orders:  -     DULoxetine (CYMBALTA) 30 mg capsule; Take 1 Capsule by mouth daily. Indications: depression, anxiety, Normal, Disp-90 Capsule, R-1     Of note, she also had a question about the Covid vaccine. She received the J&J vaccine and was concerned about some reports noting decreased efficacy with the delta variant. She is wondering if she can get revaccinated with another vaccine. Advised that I would look into this further and let her know. Benefits, risks, possible drug interactions, and side effects of all new medications were reviewed with the patient. Pt verbalized understanding. Return to clinic: 2 months for depression    An electronic signature was used to authenticate this note.   Viky Key MD  [facility]  7/21/2021    Future Appointments   Date Time Provider Chris Marissa   9/96/4916 25:70 AM Juan Jose Polo MD [facility] BS AMB        Objective   Vitals:       Visit Vitals  /66 (BP 1 Location: Left upper arm, BP Patient Position: Sitting, BP Cuff Size: Adult)   Pulse 77   Temp 97.8 °F (36.6 °C) (Temporal)   Resp 14   Ht 5' 7\" (1.702 m)   Wt 157 lb (71.2 kg)   LMP (LMP Unknown)   SpO2 97%   Breastfeeding Yes   BMI 24.59 kg/m²        Physical Exam  Constitutional:       Appearance: Normal appearance. She is not ill-appearing. Cardiovascular:      Rate and Rhythm: Normal rate. Pulmonary:      Effort: No respiratory distress. Neurological:      Mental Status: She is alert. Current Outpatient Medications   Medication Sig    DULoxetine (CYMBALTA) 30 mg capsule Take 1 Capsule by mouth daily. Indications: depression, anxiety    methylphenidate ER 18 mg 24 hr tab Take 1 Tablet by mouth every morning. Max Daily Amount: 18 mg.    [START ON 7/24/2021] methylphenidate ER 18 mg 24 hr tab Take 1 Tablet by mouth every morning. Max Daily Amount: 18 mg.    [START ON 8/23/2021] methylphenidate ER 18 mg 24 hr tab Take 1 Tablet by mouth every morning. Max Daily Amount: 18 mg.    methylphenidate ER 18 mg 24 hr tab Take 1 Tab by mouth every morning. Max Daily Amount: 18 mg.  PNV No12-Iron-FA-DSS-OM-3 29 mg iron-1 mg -50 mg CPKD Take  by mouth. No current facility-administered medications for this visit.

## 2021-07-21 NOTE — ASSESSMENT & PLAN NOTE
Significantly improved from last visit. Medication was initially making her tired. Has also noticed difficulty achieving orgasm. Was able to find a therapist since last visit who she feels is a good fit. Continues to have some family stressors but her immediate family stressors are less. PHQ-9 score of 7. Unable to do Trintellix or Viibryd because she is breast-feeding. Given significant improvement in symptoms, recommending continuing Cymbalta 30 mg daily. We discussed potentially adding or substituting with Wellbutrin if sexual dysfunction issues continue to be an issue for her.

## 2021-09-22 ENCOUNTER — OFFICE VISIT (OUTPATIENT)
Dept: INTERNAL MEDICINE CLINIC | Age: 36
End: 2021-09-22
Payer: COMMERCIAL

## 2021-09-22 VITALS
HEIGHT: 67 IN | SYSTOLIC BLOOD PRESSURE: 107 MMHG | OXYGEN SATURATION: 96 % | HEART RATE: 56 BPM | RESPIRATION RATE: 14 BRPM | DIASTOLIC BLOOD PRESSURE: 69 MMHG | WEIGHT: 154 LBS | BODY MASS INDEX: 24.17 KG/M2 | TEMPERATURE: 97.2 F

## 2021-09-22 DIAGNOSIS — F90.0 ATTENTION DEFICIT HYPERACTIVITY DISORDER (ADHD), PREDOMINANTLY INATTENTIVE TYPE: ICD-10-CM

## 2021-09-22 DIAGNOSIS — F33.1 MODERATE EPISODE OF RECURRENT MAJOR DEPRESSIVE DISORDER (HCC): Primary | ICD-10-CM

## 2021-09-22 DIAGNOSIS — F41.9 ANXIETY: ICD-10-CM

## 2021-09-22 PROCEDURE — 99214 OFFICE O/P EST MOD 30 MIN: CPT | Performed by: INTERNAL MEDICINE

## 2021-09-22 RX ORDER — SULFACETAMIDE SODIUM AND SULFUR 9; 4.5 MG/G; MG/G
RINSE TOPICAL
COMMUNITY
Start: 2021-07-06

## 2021-09-22 NOTE — ASSESSMENT & PLAN NOTE
Doing better since last visit. Reports that she still has some baseline anxiety but this is closer to her normal.  Denies any suicidal ideation. Patient would like to continue her current medications. Continue Cymbalta 30 mg daily. Of note, we had previously discussed Wellbutrin if needed due to some sexual dysfunction.   She would like to reevaluate this at her next visit to see if she is still breast-feeding

## 2021-09-22 NOTE — PROGRESS NOTES
Note   Chief Complaint   Depression, anxiety    Radha Carr is a 39 y.o. female     1. Moderate episode of recurrent major depressive disorder [facility])  Assessment & Plan:  Doing better since last visit. Reports that she still has some baseline anxiety but this is closer to her normal.  Denies any suicidal ideation. Patient would like to continue her current medications. Continue Cymbalta 30 mg daily. Of note, we had previously discussed Wellbutrin if needed due to some sexual dysfunction. She would like to reevaluate this at her next visit to see if she is still breast-feeding  2. Anxiety  Assessment & Plan:  See depression note  3. Attention deficit hyperactivity disorder (ADHD), predominantly inattentive type  Assessment & Plan:  Denies any side effects. Working well for her. Does notice when she does not take the medication. Continue methylphenidate ER 18 mg daily, no changes recommended       Benefits, risks, possible drug interactions, and side effects of all new medications were reviewed with the patient. Pt verbalized understanding. Return to clinic: 6 months for physical    An electronic signature was used to authenticate this note. Zulma Montiel MD  [facility]  9/22/2021    Future Appointments   Date Time Provider Chris Marissa   3/15/0869  3:30 PM [facility]        Objective   Vitals:       Visit Vitals  /69 (BP 1 Location: Left upper arm, BP Patient Position: Sitting, BP Cuff Size: Adult)   Pulse (!) 56   Temp 97.2 °F (36.2 °C) (Temporal)   Resp 14   Ht 5' 7\" (1.702 m)   Wt 154 lb (69.9 kg)   LMP 09/08/2021   SpO2 96%   BMI 24.12 kg/m²        Physical Exam  Constitutional:       Appearance: Normal appearance. She is not ill-appearing. Cardiovascular:      Rate and Rhythm: Normal rate. Pulmonary:      Effort: No respiratory distress. Neurological:      Mental Status: She is alert.           Current Outpatient Medications   Medication Sig    sulfacetamide sodium-sulfur 9-4.5 % clsr APPLY 1 APPLICATION TO THE FACE EXTERNALLY ONE TO TWO TIMES DAILY    DULoxetine (CYMBALTA) 30 mg capsule Take 1 Capsule by mouth daily. Indications: depression, anxiety    methylphenidate ER 18 mg 24 hr tab Take 1 Tablet by mouth every morning. Max Daily Amount: 18 mg.    methylphenidate ER 18 mg 24 hr tab Take 1 Tablet by mouth every morning. Max Daily Amount: 18 mg.    methylphenidate ER 18 mg 24 hr tab Take 1 Tablet by mouth every morning. Max Daily Amount: 18 mg.    methylphenidate ER 18 mg 24 hr tab Take 1 Tab by mouth every morning. Max Daily Amount: 18 mg.  PNV No12-Iron-FA-DSS-OM-3 29 mg iron-1 mg -50 mg CPKD Take  by mouth. No current facility-administered medications for this visit.

## 2021-11-10 DIAGNOSIS — F33.2 SEVERE EPISODE OF RECURRENT MAJOR DEPRESSIVE DISORDER, WITHOUT PSYCHOTIC FEATURES (HCC): ICD-10-CM

## 2021-11-10 RX ORDER — DULOXETIN HYDROCHLORIDE 30 MG/1
30 CAPSULE, DELAYED RELEASE ORAL DAILY
Qty: 90 CAPSULE | Refills: 3 | Status: SHIPPED | OUTPATIENT
Start: 2021-11-10 | End: 2022-03-28 | Stop reason: SDUPTHER

## 2021-12-13 ENCOUNTER — TELEPHONE (OUTPATIENT)
Dept: INTERNAL MEDICINE CLINIC | Age: 36
End: 2021-12-13

## 2021-12-13 NOTE — TELEPHONE ENCOUNTER
----- Message from Lois Sandhu sent at 12/13/2021  2:09 PM EST -----  Subject: Message to Provider    QUESTIONS  Information for Provider? Pt is needing to r/s her appt for a physical in   March/2022.  ---------------------------------------------------------------------------  --------------  CALL BACK INFO  What is the best way for the office to contact you? OK to leave message on   voicemail  Preferred Call Back Phone Number? [phone]  ---------------------------------------------------------------------------  --------------  SCRIPT ANSWERS  Relationship to Patient?  Self

## 2022-02-08 DIAGNOSIS — F90.0 ATTENTION DEFICIT HYPERACTIVITY DISORDER (ADHD), PREDOMINANTLY INATTENTIVE TYPE: ICD-10-CM

## 2022-02-10 RX ORDER — METHYLPHENIDATE HYDROCHLORIDE 18 MG/1
18 TABLET ORAL
Qty: 30 TABLET | Refills: 0 | Status: SHIPPED | OUTPATIENT
Start: 2022-02-10 | End: 2022-03-28 | Stop reason: SDUPTHER

## 2022-03-18 PROBLEM — Z80.3 FAMILY HISTORY OF BREAST CANCER: Status: ACTIVE | Noted: 2021-03-22

## 2022-03-18 PROBLEM — Z00.00 ROUTINE ADULT HEALTH MAINTENANCE: Status: ACTIVE | Noted: 2021-06-24

## 2022-03-19 PROBLEM — E28.2 PCOS (POLYCYSTIC OVARIAN SYNDROME): Status: ACTIVE | Noted: 2020-02-17

## 2022-03-19 PROBLEM — F33.1 MODERATE EPISODE OF RECURRENT MAJOR DEPRESSIVE DISORDER (HCC): Status: ACTIVE | Noted: 2021-06-24

## 2022-03-19 PROBLEM — F90.0 ATTENTION DEFICIT HYPERACTIVITY DISORDER (ADHD), PREDOMINANTLY INATTENTIVE TYPE: Status: ACTIVE | Noted: 2021-03-22

## 2022-03-19 PROBLEM — D69.6 THROMBOCYTOPENIA (HCC): Status: ACTIVE | Noted: 2021-03-22

## 2022-03-20 PROBLEM — F41.9 ANXIETY: Status: ACTIVE | Noted: 2021-03-22

## 2022-03-28 ENCOUNTER — OFFICE VISIT (OUTPATIENT)
Dept: INTERNAL MEDICINE CLINIC | Age: 37
End: 2022-03-28
Payer: COMMERCIAL

## 2022-03-28 VITALS
DIASTOLIC BLOOD PRESSURE: 70 MMHG | SYSTOLIC BLOOD PRESSURE: 113 MMHG | RESPIRATION RATE: 14 BRPM | HEART RATE: 60 BPM | HEIGHT: 67 IN | TEMPERATURE: 97.4 F | BODY MASS INDEX: 23.86 KG/M2 | WEIGHT: 152 LBS | OXYGEN SATURATION: 98 %

## 2022-03-28 DIAGNOSIS — F33.1 MODERATE EPISODE OF RECURRENT MAJOR DEPRESSIVE DISORDER (HCC): ICD-10-CM

## 2022-03-28 DIAGNOSIS — F90.0 ATTENTION DEFICIT HYPERACTIVITY DISORDER (ADHD), PREDOMINANTLY INATTENTIVE TYPE: ICD-10-CM

## 2022-03-28 DIAGNOSIS — Z00.00 WELL ADULT EXAM: Primary | ICD-10-CM

## 2022-03-28 DIAGNOSIS — D69.6 THROMBOCYTOPENIA (HCC): ICD-10-CM

## 2022-03-28 DIAGNOSIS — E78.00 HYPERCHOLESTEROLEMIA: ICD-10-CM

## 2022-03-28 DIAGNOSIS — D22.9 MULTIPLE NEVI: ICD-10-CM

## 2022-03-28 DIAGNOSIS — R73.03 PREDIABETES: ICD-10-CM

## 2022-03-28 DIAGNOSIS — F41.9 ANXIETY: ICD-10-CM

## 2022-03-28 PROBLEM — U07.1 COVID-19: Status: ACTIVE | Noted: 2022-03-28

## 2022-03-28 PROCEDURE — 99395 PREV VISIT EST AGE 18-39: CPT | Performed by: INTERNAL MEDICINE

## 2022-03-28 RX ORDER — METHYLPHENIDATE HYDROCHLORIDE 18 MG/1
18 TABLET ORAL
Qty: 30 TABLET | Refills: 0 | Status: SHIPPED | OUTPATIENT
Start: 2022-04-27 | End: 2022-08-19

## 2022-03-28 RX ORDER — METHYLPHENIDATE HYDROCHLORIDE 18 MG/1
18 TABLET ORAL
Qty: 30 TABLET | Refills: 0 | Status: SHIPPED | OUTPATIENT
Start: 2022-05-27 | End: 2022-10-27

## 2022-03-28 RX ORDER — DULOXETIN HYDROCHLORIDE 30 MG/1
30 CAPSULE, DELAYED RELEASE ORAL DAILY
Qty: 90 CAPSULE | Refills: 3 | Status: SHIPPED | OUTPATIENT
Start: 2022-03-28 | End: 2022-10-27

## 2022-03-28 RX ORDER — METHYLPHENIDATE HYDROCHLORIDE 18 MG/1
18 TABLET ORAL
Qty: 30 TABLET | Refills: 0 | Status: SHIPPED | OUTPATIENT
Start: 2022-03-28 | End: 2022-08-19

## 2022-03-28 NOTE — ASSESSMENT & PLAN NOTE
Still experiencing some difficulty achieving climax but feeling good with current medication and does not want make any changes at this time. She was considering stopping the medication  Continue Cymbalta 30 mg daily. Advised to call if she wants to stop the medication prior to next visit.   Advised not to stop cold turkey

## 2022-03-28 NOTE — PROGRESS NOTES
Assessment and Plan     1. Well adult exam  Assessment & Plan:  Encourage healthy habits  Requesting early breast cancer screening due to mother's history of breast cancer at 39. She is still breast-feeding. We will touch base with OB/GYN for timing of mammorelated to ending breast-feeding and modality  2. Attention deficit hyperactivity disorder (ADHD), predominantly inattentive type  Assessment & Plan:   well controlled, continue current medications   occl skips doses; sometimes forgets and doesn't want to take it late because it'll cause sleep issues  Methylphenidate ER 18 mg daily  Orders:  -     methylphenidate ER 18 mg 24 hr tab; Take 1 Tablet by mouth every morning. Max Daily Amount: 18 mg., Normal, Disp-30 Tablet, R-0  -     methylphenidate ER 18 mg 24 hr tab; Take 1 Tablet by mouth every morning. Max Daily Amount: 18 mg., Normal, Disp-30 Tablet, R-0  -     methylphenidate ER 18 mg 24 hr tab; Take 1 Tablet by mouth every morning. Max Daily Amount: 18 mg., Normal, Disp-30 Tablet, R-0  -     METABOLIC PANEL, COMPREHENSIVE; Future  3. Moderate episode of recurrent major depressive disorder [facility])  Assessment & Plan:  Still experiencing some difficulty achieving climax but feeling good with current medication and does not want make any changes at this time. She was considering stopping the medication  Continue Cymbalta 30 mg daily. Advised to call if she wants to stop the medication prior to next visit. Advised not to stop cold turkey  Orders:  -     DULoxetine (CYMBALTA) 30 mg capsule; Take 1 Capsule by mouth daily. Indications: depression, Normal, Disp-90 Capsule, R-3  4. Thrombocytopenia ([address]  Assessment & Plan:  Previously noted in the setting of delivery and heparin use. Repeat normal.  Recheck today  Orders:  -     CBC W/O DIFF; Future  5. Prediabetes  Assessment & Plan:  Noted previously. Check today  Orders:  -     HEMOGLOBIN A1C WITH EAG; Future  6.  Hypercholesterolemia  Assessment & Plan:  Noted previously, check today  Orders:  -     LIPID PANEL; Future  7. Anxiety  Assessment & Plan:  See depression note  8. Multiple nevi  Assessment & Plan:  Followed by Kandi Denny for skin checks       Benefits, risks, possible drug interactions, and side effects of all new medications were reviewed with the patient. Pt verbalized understanding. 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 Edson Solo MD  [facility]  3/28/2022    Future Appointments   Date Time Provider Chris Ann   2/21/7657 66:07 AM Vicki Wright MD [facility] BS AMB        History of Present Illness   Chief Complaint   Physical    Blu Wilson is a 39 y.o. female         Review of Systems   Constitutional: Negative for chills and fever. HENT: Negative for hearing loss. Eyes: Negative for blurred vision. Respiratory: Negative for shortness of breath. Cardiovascular: Negative for chest pain. Gastrointestinal: Negative for abdominal pain, blood in stool, constipation, diarrhea, melena, nausea and vomiting. Genitourinary: Negative for dysuria and hematuria. Musculoskeletal: Negative for joint pain. Skin: Negative for rash. Neurological: Negative for headaches. Past Medical History     Allergies   Allergen Reactions    Zoloft [Sertraline] Rash        Current Outpatient Medications   Medication Sig    methylphenidate ER 18 mg 24 hr tab Take 1 Tablet by mouth every morning. Max Daily Amount: 18 mg.    [START ON 4/27/2022] methylphenidate ER 18 mg 24 hr tab Take 1 Tablet by mouth every morning. Max Daily Amount: 18 mg.    [START ON 5/27/2022] methylphenidate ER 18 mg 24 hr tab Take 1 Tablet by mouth every morning.  Max Daily Amount: 18 mg.    DULoxetine (CYMBALTA) 30 mg capsule Take 1 Capsule by mouth daily. Indications: depression    sulfacetamide sodium-sulfur 9-4.5 % clsr APPLY 1 APPLICATION TO THE FACE EXTERNALLY ONE TO TWO TIMES DAILY    methylphenidate ER 18 mg 24 hr tab Take 1 Tablet by mouth every morning. Max Daily Amount: 18 mg.    methylphenidate ER 18 mg 24 hr tab Take 1 Tablet by mouth every morning. Max Daily Amount: 18 mg.    methylphenidate ER 18 mg 24 hr tab Take 1 Tab by mouth every morning. Max Daily Amount: 18 mg.  PNV No12-Iron-FA-DSS-OM-3 29 mg iron-1 mg -50 mg CPKD Take  by mouth. No current facility-administered medications for this visit.           Patient Active Problem List   Diagnosis Code    PCOS (polycystic ovarian syndrome) E28.2    Venous thrombosis I82.90    Thrombocytopenia ([facility] D69.6    Attention deficit hyperactivity disorder (ADHD), predominantly inattentive type F90.0    Anxiety F41.9    Family history of breast cancer Z80.3    Moderate episode of recurrent major depressive disorder ([facility] F33.1    Well adult exam Z00.00    Prediabetes R73.03    Hypercholesterolemia E78.00    COVID-19 U07.1    Multiple nevi D22.9     Past Surgical History:   Procedure Laterality Date    HX HYSTEROSCOPY  02/28/2020    Removal of imbedded IUD arm    HX OTHER SURGICAL      Sinus venous thrombosis      Social History     Tobacco Use    Smoking status: Never Smoker    Smokeless tobacco: Never Used   Substance Use Topics    Alcohol use: Yes     Comment: 2-4 drinks/week      Family History   Problem Relation Age of Onset    Breast Cancer Mother 39    High Cholesterol Mother     Parkinson's Disease Father         Physical Exam   Vitals:       Visit Vitals  /70 (BP 1 Location: Left upper arm, BP Patient Position: Sitting, BP Cuff Size: Adult)   Pulse 60   Temp 97.4 °F (36.3 °C) (Oral)   Resp 14   Ht 5' 7\" (1.702 m)   Wt 152 lb (68.9 kg)   LMP 02/26/2022   SpO2 98%   BMI 23.81 kg/m²        Physical Exam  Constitutional:       General: She is not in acute distress. Appearance: She is well-developed. HENT:      Right Ear: Tympanic membrane, ear canal and external ear normal.      Left Ear: Tympanic membrane, ear canal and external ear normal.   Eyes:      Extraocular Movements: Extraocular movements intact. Conjunctiva/sclera: Conjunctivae normal.   Cardiovascular:      Rate and Rhythm: Normal rate and regular rhythm. Pulses: Normal pulses. Heart sounds: No murmur heard. No friction rub. No gallop. Pulmonary:      Effort: No respiratory distress. Breath sounds: No wheezing, rhonchi or rales. Abdominal:      General: Bowel sounds are normal. There is no distension. Palpations: Abdomen is soft. There is no hepatomegaly, splenomegaly or mass. Tenderness: There is no abdominal tenderness. There is no guarding. Musculoskeletal:      Cervical back: Neck supple. Right lower leg: No edema. Left lower leg: No edema. Lymphadenopathy:      Cervical: No cervical adenopathy. Skin:     General: Skin is warm. Findings: No rash. Neurological:      Mental Status: She is alert.

## 2022-03-29 NOTE — ASSESSMENT & PLAN NOTE
Encourage healthy habits  Requesting early breast cancer screening due to mother's history of breast cancer at 39. She is still breast-feeding.   We will touch base with OB/GYN for timing of mammorelated to ending breast-feeding and modality

## 2022-04-14 LAB
ALBUMIN SERPL-MCNC: 3.7 G/DL (ref 3.5–5)
ALBUMIN/GLOB SERPL: 1.2 {RATIO} (ref 1.1–2.2)
ALP SERPL-CCNC: 44 U/L (ref 45–117)
ALT SERPL-CCNC: 21 U/L (ref 12–78)
ANION GAP SERPL CALC-SCNC: 4 MMOL/L (ref 5–15)
AST SERPL-CCNC: 10 U/L (ref 15–37)
BILIRUB SERPL-MCNC: 0.5 MG/DL (ref 0.2–1)
BUN SERPL-MCNC: 24 MG/DL (ref 6–20)
BUN/CREAT SERPL: 32 (ref 12–20)
CALCIUM SERPL-MCNC: 9 MG/DL (ref 8.5–10.1)
CHLORIDE SERPL-SCNC: 109 MMOL/L (ref 97–108)
CHOLEST SERPL-MCNC: 175 MG/DL
CO2 SERPL-SCNC: 26 MMOL/L (ref 21–32)
CREAT SERPL-MCNC: 0.76 MG/DL (ref 0.55–1.02)
ERYTHROCYTE [DISTWIDTH] IN BLOOD BY AUTOMATED COUNT: 13.1 % (ref 11.5–14.5)
EST. AVERAGE GLUCOSE BLD GHB EST-MCNC: 108 MG/DL
GLOBULIN SER CALC-MCNC: 3.1 G/DL (ref 2–4)
GLUCOSE SERPL-MCNC: 94 MG/DL (ref 65–100)
HBA1C MFR BLD: 5.4 % (ref 4–5.6)
HCT VFR BLD AUTO: 39.3 % (ref 35–47)
HDLC SERPL-MCNC: 71 MG/DL
HDLC SERPL: 2.5 {RATIO} (ref 0–5)
HGB BLD-MCNC: 12.8 G/DL (ref 11.5–16)
LDLC SERPL CALC-MCNC: 94.6 MG/DL (ref 0–100)
MCH RBC QN AUTO: 30.3 PG (ref 26–34)
MCHC RBC AUTO-ENTMCNC: 32.6 G/DL (ref 30–36.5)
MCV RBC AUTO: 93.1 FL (ref 80–99)
NRBC # BLD: 0 K/UL (ref 0–0.01)
NRBC BLD-RTO: 0 PER 100 WBC
PLATELET # BLD AUTO: 194 K/UL (ref 150–400)
POTASSIUM SERPL-SCNC: 4.2 MMOL/L (ref 3.5–5.1)
PROT SERPL-MCNC: 6.8 G/DL (ref 6.4–8.2)
RBC # BLD AUTO: 4.22 M/UL (ref 3.8–5.2)
SODIUM SERPL-SCNC: 139 MMOL/L (ref 136–145)
TRIGL SERPL-MCNC: 47 MG/DL (ref ?–150)
VLDLC SERPL CALC-MCNC: 9.4 MG/DL
WBC # BLD AUTO: 5.8 K/UL (ref 3.6–11)

## 2022-04-17 NOTE — PROGRESS NOTES
Venda message sent. LDL 94 from 109. A1c 5.4 from 5.8.   CMP normal.  CBC normal    Normal.  Monitor

## 2022-04-27 PROBLEM — Z00.00 WELL ADULT EXAM: Status: RESOLVED | Noted: 2021-06-24 | Resolved: 2022-04-27

## 2022-08-19 ENCOUNTER — OFFICE VISIT (OUTPATIENT)
Dept: INTERNAL MEDICINE CLINIC | Age: 37
End: 2022-08-19
Payer: COMMERCIAL

## 2022-08-19 VITALS
OXYGEN SATURATION: 95 % | HEIGHT: 67 IN | TEMPERATURE: 98 F | DIASTOLIC BLOOD PRESSURE: 69 MMHG | BODY MASS INDEX: 24.64 KG/M2 | SYSTOLIC BLOOD PRESSURE: 108 MMHG | HEART RATE: 71 BPM | RESPIRATION RATE: 14 BRPM | WEIGHT: 157 LBS

## 2022-08-19 DIAGNOSIS — E28.2 PCOS (POLYCYSTIC OVARIAN SYNDROME): ICD-10-CM

## 2022-08-19 DIAGNOSIS — F90.0 ATTENTION DEFICIT HYPERACTIVITY DISORDER (ADHD), PREDOMINANTLY INATTENTIVE TYPE: ICD-10-CM

## 2022-08-19 DIAGNOSIS — R53.83 FATIGUE, UNSPECIFIED TYPE: Primary | ICD-10-CM

## 2022-08-19 DIAGNOSIS — Z00.00 ROUTINE ADULT HEALTH MAINTENANCE: ICD-10-CM

## 2022-08-19 DIAGNOSIS — Z12.31 ENCOUNTER FOR SCREENING MAMMOGRAM FOR MALIGNANT NEOPLASM OF BREAST: ICD-10-CM

## 2022-08-19 DIAGNOSIS — F33.1 MODERATE EPISODE OF RECURRENT MAJOR DEPRESSIVE DISORDER (HCC): ICD-10-CM

## 2022-08-19 PROCEDURE — 99214 OFFICE O/P EST MOD 30 MIN: CPT | Performed by: INTERNAL MEDICINE

## 2022-08-19 RX ORDER — BUPROPION HYDROCHLORIDE 150 MG/1
150 TABLET ORAL
Qty: 30 TABLET | Refills: 1 | Status: SHIPPED | OUTPATIENT
Start: 2022-08-19 | End: 2022-10-28 | Stop reason: SDUPTHER

## 2022-08-19 RX ORDER — TRETINOIN 0.25 MG/G
CREAM TOPICAL
COMMUNITY
Start: 2022-08-11

## 2022-08-19 RX ORDER — CLASCOTERONE 1 G/100G
1 CREAM TOPICAL
COMMUNITY
Start: 2022-08-11

## 2022-08-19 NOTE — ASSESSMENT & PLAN NOTE
Has been managing with diet/exercise  Diagnosed when younger with weight gain, acne, abnormal periods  Derm - recommended topical spirono  Not on metformin before   Having issues currently with losing weight  Consider metformin in the future which may help with weight

## 2022-08-19 NOTE — ASSESSMENT & PLAN NOTE
Still feels some depression though not as severe as before  Has been noting difficulty with losing weight  No longer breastfeeding, no history of seizures  Rec adding wellbutrin 150XL daily, cont cymbalta 30mg daily.  Consider weaning off cymbalta in the future

## 2022-08-19 NOTE — ASSESSMENT & PLAN NOTE
Hasn't been using methylphenidate much since last visit due to sleep issues - last fill in April  Starting wellbutrin 150mg XL daily mostly for depression but may help with ADHD.  Consider other medications in the future

## 2022-08-19 NOTE — PROGRESS NOTES
Note   Chief Complaint   fatigue    Vik Bray is a 44 y.o. female     Will need be well paperwork filled out. 35in waist circumference    1. Fatigue, unspecified type  Assessment & Plan:  Worsening fatigue  Stopped breastfeeding in may  Did a cleanse with on diary, gluten, sugar, alcohol  Has been doing a lot of activity this summer  Despite all that, still gaining weight  Fatigue has been there since teen years  Strong family history of thyroid disease, hashimoto's  Sleeps 7 hours a night, been taking benadryl to fall asleep   No CP, SOB   Symptoms and weight likely multifactorial. Rec checking labs  Orders:  -     MAGNESIUM; Future  -     CBC WITH AUTOMATED DIFF; Future  -     METABOLIC PANEL, COMPREHENSIVE; Future  -     VITAMIN D, 25 HYDROXY; Future  -     VITAMIN B12 & FOLATE; Future  -     TSH 3RD GENERATION; Future  -     T4, FREE; Future  -     IRON PROFILE; Future  -     FERRITIN; Future  2. Encounter for screening mammogram for malignant neoplasm of breast  -     [facility] 3D SOLITARIO W MAMMO BI SCREENING INCL CAD; Future  3. Moderate episode of recurrent major depressive disorder ([facility] [address]  Assessment & Plan:  Still feels some depression though not as severe as before  Has been noting difficulty with losing weight  No longer breastfeeding, no history of seizures  Rec adding wellbutrin 150XL daily, cont cymbalta 30mg daily. Consider weaning off cymbalta in the future  Orders:  -     buPROPion XL (WELLBUTRIN XL) 150 mg tablet; Take 1 Tablet by mouth every morning. Indications: depression, Normal, Disp-30 Tablet, R-1  4. PCOS (polycystic ovarian syndrome)  Assessment & Plan:   Has been managing with diet/exercise  Diagnosed when younger with weight gain, acne, abnormal periods  Derm - recommended topical spirono  Not on metformin before   Having issues currently with losing weight  Consider metformin in the future which may help with weight  5.  Attention deficit hyperactivity disorder (ADHD), predominantly inattentive type  Assessment & Plan:   Hasn't been using methylphenidate much since last visit due to sleep issues - last fill in April  Starting wellbutrin 150mg XL daily mostly for depression but may help with ADHD. Consider other medications in the future  6. Routine adult health maintenance  Assessment & Plan:  Mammogram ordered due to high risk of breast cancer due to family history       Benefits, risks, possible drug interactions, and side effects of all new medications were reviewed with the patient. Pt verbalized understanding. 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 Don Mckeon MD  [facility]  8/19/2022    Future Appointments   Date Time Provider Chris Lundisti   2/62/3007 02:87 AM Yuni Cormier MD [facility] BS AMB        Objective   Vitals:       Visit Vitals  /69 (BP 1 Location: Left upper arm, BP Patient Position: Sitting, BP Cuff Size: Adult)   Pulse 71   Temp 98 °F (36.7 °C) (Oral)   Resp 14   Ht 5' 7\" (1.702 m)   Wt 157 lb (71.2 kg)   LMP 07/24/2022 (Exact Date)   SpO2 95%   Breastfeeding No   BMI 24.59 kg/m²        Physical Exam  Constitutional:       Appearance: Normal appearance. She is not ill-appearing. Cardiovascular:      Rate and Rhythm: Normal rate and regular rhythm. Heart sounds: No murmur heard. No friction rub. No gallop. Pulmonary:      Effort: No respiratory distress. Breath sounds: Normal breath sounds. No wheezing, rhonchi or rales. Neurological:      Mental Status: She is alert. Current Outpatient Medications   Medication Sig    clascoterone (Winlevi) 1 % crea 1 Tube daily as needed.     tretinoin (RETIN-A) 0.025 % topical cream 1 application    buPROPion XL (WELLBUTRIN XL) 150 mg tablet Take 1 Tablet by mouth every morning. Indications: depression    methylphenidate ER 18 mg 24 hr tab Take 1 Tablet by mouth every morning. Max Daily Amount: 18 mg. DULoxetine (CYMBALTA) 30 mg capsule Take 1 Capsule by mouth daily. Indications: depression    PNV No12-Iron-FA-DSS-OM-3 29 mg iron-1 mg -50 mg CPKD Take  by mouth.    sulfacetamide sodium-sulfur 9-4.5 % clsr APPLY 1 APPLICATION TO THE FACE EXTERNALLY ONE TO TWO TIMES DAILY (Patient not taking: Reported on 8/19/2022)     No current facility-administered medications for this visit.

## 2022-08-19 NOTE — ASSESSMENT & PLAN NOTE
Worsening fatigue  Stopped breastfeeding in may  Did a cleanse with on diary, gluten, sugar, alcohol  Has been doing a lot of activity this summer  Despite all that, still gaining weight  Fatigue has been there since teen years  Strong family history of thyroid disease, hashimoto's  Sleeps 7 hours a night, been taking benadryl to fall asleep   No CP, SOB   Symptoms and weight likely multifactorial. Rec checking labs

## 2022-08-23 RX ORDER — DOXYCYCLINE 100 MG/1
200 CAPSULE ORAL ONCE
Qty: 2 CAPSULE | Refills: 0 | Status: SHIPPED | OUTPATIENT
Start: 2022-08-23 | End: 2022-08-23

## 2022-09-18 PROBLEM — Z00.00 ROUTINE ADULT HEALTH MAINTENANCE: Status: RESOLVED | Noted: 2021-06-24 | Resolved: 2022-09-18

## 2022-09-29 ENCOUNTER — OFFICE VISIT (OUTPATIENT)
Dept: INTERNAL MEDICINE CLINIC | Age: 37
End: 2022-09-29
Payer: COMMERCIAL

## 2022-09-29 VITALS
BODY MASS INDEX: 25.11 KG/M2 | DIASTOLIC BLOOD PRESSURE: 64 MMHG | WEIGHT: 160 LBS | SYSTOLIC BLOOD PRESSURE: 112 MMHG | RESPIRATION RATE: 16 BRPM | OXYGEN SATURATION: 98 % | HEART RATE: 64 BPM | TEMPERATURE: 97.7 F | HEIGHT: 67 IN

## 2022-09-29 DIAGNOSIS — F90.0 ATTENTION DEFICIT HYPERACTIVITY DISORDER (ADHD), PREDOMINANTLY INATTENTIVE TYPE: ICD-10-CM

## 2022-09-29 DIAGNOSIS — Z86.39 HISTORY OF GOITER: ICD-10-CM

## 2022-09-29 DIAGNOSIS — R53.83 FATIGUE, UNSPECIFIED TYPE: ICD-10-CM

## 2022-09-29 DIAGNOSIS — F33.1 MODERATE EPISODE OF RECURRENT MAJOR DEPRESSIVE DISORDER (HCC): ICD-10-CM

## 2022-09-29 PROCEDURE — 99214 OFFICE O/P EST MOD 30 MIN: CPT | Performed by: INTERNAL MEDICINE

## 2022-09-29 NOTE — PROGRESS NOTES
Note   Chief Complaint   Medication    Jae Player is a 44 y.o. female     1. Attention deficit hyperactivity disorder (ADHD), predominantly inattentive type  Assessment & Plan:  Minimal improvement in symptoms since starting Wellbutrin although has had a lot of stuff going on in the past month  No history of heart problems, heart arrhythmia problems, glaucoma  Continue Wellbutrin 150 XL daily for now as that is helping with depression. We discussed potentially starting phentermine which could help with ADHD, fatigue, and weight  Previous medications: Methylphenidate (insomnia)  2. Fatigue, unspecified type  Assessment & Plan:   Daily fatigue about the same   Very tired after exercising - tried working out sun/mon 45min-1hr class and was very tired the rest of the week   Now doing lighter activities like yoga/pilates   Not running because tired; has done half marathons in the past   No CP, SOB   Sleep maybe better since last visit - gets at least 7 hours - doesn't wake up refreshed but she's never felt like that   Not usually snoring, apnea   No leg swelling  No fevers, chils, night sweats, unexpected weight loss   Maybe worse since COVID   Wonder if this is long COVID. Testing from last visit was normal.  We discussed potentially starting phentermine which could help with ADHD, weight, and fatigue  3. History of goiter  Assessment & Plan:  Noted incidentally on an MRI   Most recent TFTs normal  4. Moderate episode of recurrent major depressive disorder [facility])  Assessment & Plan:   Been doing okay  No SE with wellbutrin   Had a lot going on in the past month  Interested in weaning off Cymbalta  Continue Wellbutrin 150 XL daily. Wean Cymbalta-take every other day for 2 weeks then stop      Benefits, risks, possible drug interactions, and side effects of all new medications were reviewed with the patient. Pt verbalized understanding.     Return to clinic: Advised to send me a message in 2 weeks to let me know how medication changes are going. Plan to start phentermine if doing okay off Cymbalta for a month and follow-up month after that  \"SHY\"  two daughters 2 and 5 ryan cortes and son Carmela Jesus  moved from [place]   is a pediatrician    An electronic signature was used to authenticate this note. Isa Watkins MD  [facility]  9/29/2022    Future Appointments   Date Time Provider Chris Culleni   10/20/2022 10:15 AM [address]        Objective   Vitals:       Visit Vitals  /64   Pulse 64   Temp 97.7 °F (36.5 °C) (Oral)   Resp 16   Ht 5' 7\" (1.702 m)   Wt 160 lb (72.6 kg)   SpO2 98%   BMI 25.06 kg/m²        Physical Exam  Constitutional:       Appearance: Normal appearance. She is not ill-appearing. Cardiovascular:      Rate and Rhythm: Normal rate and regular rhythm. Heart sounds: No murmur heard. No friction rub. No gallop. Pulmonary:      Effort: No respiratory distress. Breath sounds: Normal breath sounds. No wheezing, rhonchi or rales. Neurological:      Mental Status: She is alert. Current Outpatient Medications   Medication Sig    clascoterone (Winlevi) 1 % crea 1 Tube daily as needed. tretinoin (RETIN-A) 0.025 % topical cream 1 application    buPROPion XL (WELLBUTRIN XL) 150 mg tablet Take 1 Tablet by mouth every morning. Indications: depression    DULoxetine (CYMBALTA) 30 mg capsule Take 1 Capsule by mouth daily. Indications: depression    PNV No12-Iron-FA-DSS-OM-3 29 mg iron-1 mg -50 mg CPKD Take  by mouth.    methylphenidate ER 18 mg 24 hr tab Take 1 Tablet by mouth every morning. Max Daily Amount: 18 mg. (Patient not taking: Reported on 9/29/2022)    sulfacetamide sodium-sulfur 9-4.5 % clsr APPLY 1 APPLICATION TO THE FACE EXTERNALLY ONE TO TWO TIMES DAILY (Patient not taking: No sig reported)     No current facility-administered medications for this visit.

## 2022-09-29 NOTE — PATIENT INSTRUCTIONS
Take cymbalta every other day for 2 weeks then stop   Send me a message in a month to let me know how that's going.   We may plan to start phentermine

## 2022-09-29 NOTE — ASSESSMENT & PLAN NOTE
Minimal improvement in symptoms since starting Wellbutrin although has had a lot of stuff going on in the past month  No history of heart problems, heart arrhythmia problems, glaucoma  Continue Wellbutrin 150 XL daily for now as that is helping with depression.   We discussed potentially starting phentermine which could help with ADHD, fatigue, and weight  Previous medications: Methylphenidate (insomnia)

## 2022-09-29 NOTE — ASSESSMENT & PLAN NOTE
Daily fatigue about the same   Very tired after exercising - tried working out sun/mon 45min-1hr class and was very tired the rest of the week   Now doing lighter activities like yoga/pilates   Not running because tired; has done half marathons in the past   No CP, SOB   Sleep maybe better since last visit - gets at least 7 hours - doesn't wake up refreshed but she's never felt like that   Not usually snoring, apnea   No leg swelling  No fevers, chils, night sweats, unexpected weight loss   Maybe worse since COVID   Wonder if this is long COVID.   Testing from last visit was normal.  We discussed potentially starting phentermine which could help with ADHD, weight, and fatigue

## 2022-09-29 NOTE — ASSESSMENT & PLAN NOTE
Been doing okay  No SE with wellbutrin   Had a lot going on in the past month  Interested in weaning off Cymbalta  Continue Wellbutrin 150 XL daily.   Wean Cymbalta-take every other day for 2 weeks then stop

## 2022-10-20 ENCOUNTER — HOSPITAL ENCOUNTER (OUTPATIENT)
Dept: MAMMOGRAPHY | Age: 37
Discharge: HOME | End: 2022-10-20
Attending: INTERNAL MEDICINE
Payer: COMMERCIAL

## 2022-10-20 DIAGNOSIS — Z12.31 ENCOUNTER FOR SCREENING MAMMOGRAM FOR MALIGNANT NEOPLASM OF BREAST: ICD-10-CM

## 2022-10-20 PROCEDURE — 77063 BREAST TOMOSYNTHESIS BI: CPT

## 2022-10-27 DIAGNOSIS — E66.3 OVERWEIGHT: Primary | ICD-10-CM

## 2022-10-27 RX ORDER — PHENTERMINE HYDROCHLORIDE 37.5 MG/1
18.75 TABLET ORAL
Qty: 30 TABLET | Refills: 1 | Status: SHIPPED | OUTPATIENT
Start: 2022-10-27

## 2022-10-28 DIAGNOSIS — F33.1 MODERATE EPISODE OF RECURRENT MAJOR DEPRESSIVE DISORDER (HCC): ICD-10-CM

## 2022-10-29 RX ORDER — BUPROPION HYDROCHLORIDE 150 MG/1
150 TABLET ORAL
Qty: 30 TABLET | Refills: 1 | Status: SHIPPED | OUTPATIENT
Start: 2022-10-29 | End: 2022-10-29 | Stop reason: SDUPTHER

## 2023-03-07 ENCOUNTER — TELEPHONE (OUTPATIENT)
Dept: INTERNAL MEDICINE CLINIC | Age: 38
End: 2023-03-07

## 2023-03-07 NOTE — TELEPHONE ENCOUNTER
Patient would like a call back from the  as she would like to discuss seeing another internist and not a family medicine doctor. Patient states she has a complicated medical history which is why she wants to get in with another internal medicine type of doctor when her PCP leaves.

## 2023-04-13 PROBLEM — Z00.00 WELL ADULT EXAM: Status: ACTIVE | Noted: 2021-06-24

## 2023-04-13 PROBLEM — L70.0 ACNE VULGARIS: Status: ACTIVE | Noted: 2023-04-13

## 2023-04-13 PROBLEM — D69.6 THROMBOCYTOPENIA (HCC): Status: RESOLVED | Noted: 2021-03-22 | Resolved: 2023-04-13

## 2023-05-02 ENCOUNTER — OFFICE VISIT (OUTPATIENT)
Dept: ORTHOPEDIC SURGERY | Age: 38
End: 2023-05-02
Payer: COMMERCIAL

## 2023-05-02 VITALS — BODY MASS INDEX: 23.86 KG/M2 | HEIGHT: 67 IN | WEIGHT: 152 LBS

## 2023-05-02 DIAGNOSIS — S43.432A LABRAL TEAR OF SHOULDER, LEFT, INITIAL ENCOUNTER: ICD-10-CM

## 2023-05-02 DIAGNOSIS — M25.512 LEFT SHOULDER PAIN, UNSPECIFIED CHRONICITY: Primary | ICD-10-CM

## 2023-05-02 PROCEDURE — 99204 OFFICE O/P NEW MOD 45 MIN: CPT | Performed by: ORTHOPAEDIC SURGERY

## 2023-05-02 NOTE — PROGRESS NOTES
ASSESSMENT/PLAN:  Below is the assessment and plan developed based on review of pertinent history, physical exam, labs, studies, and medications. 1. Left shoulder pain, unspecified chronicity  -     XR SHOULDER LT AP/LAT MIN 2 V; Future  2. Labral tear of shoulder, left, initial encounter      No follow-ups on file. In discussion with the patient, we considered the numerus possible diagnoses that could be contributing to their present symptoms. We also deliberated on the extensive management options that must be considered to treat their current condition. We reviewed their accessible prior medical records, diagnostic tests, and current health and employment information. We considered how these symptoms were affecting the patient´s activities of daily living as well as employment and fitness activities. The patient had various questions regarding the possible risks, benefits, complications, morbidity and mortality regarding their diagnosis and treatment options. The patients´ comorbidities were considered, and I advocated that they consider maximizing lifestyle modification through nutrition and exercise to aid in addressing their symptoms. Shared decision making yielded an understanding to move forward with conservation treatment preferences. The patient expressed understanding that if conservative management fails to alleviate the present symptoms they will return to office for re-evaluation and consideration of additional diagnostic tests and potential surgical options. In the interim, we have recommended ice, elevation, and take prescription anti-inflammatory medications along with a physician directed home exercise program. We discussed the risks and common side effects of anti-inflammatory medications and instructed the patient to discontinue the medication and contact us if they experienced any side effects.  The patient was encouraged to discuss the possible side effects with their family physician or pharmacist prior to initiating any new medications. We discussed the fact that many of the recommended treatment options presented are significantly limited by the patient´s social determinants of health. We also reviewed the circumstances surrounding the environment that they live and work which affect a wide range of health risk. We considered the limited access to appropriate educational resources regarding proper nutrition and exercise as well as the economic and social support necessary to maintain health and wellbeing. Given that the patient's symptoms are increasing in frequency and duration, we have decided to evaluate the etiology of the pain and loss of function with an MRI. We discussed the risks of an MRI which include, but are not limited to the enclosed space, noisy environment, magnetic effect on implanted metal. We also talked about the fact that MRI is also contraindicated in the presence of internal metallic objects such as bullets or shrapnel, as well as surgical clips, pins, plates, screws, metal sutures, or wire mesh. We talked about the fact that MRI does not use radiation, but it may be contrindicated if the patient has implanted pacemakers, intracranial aneurysm clips, cochlear implants, certain prosthetic devices, implanted drug infusion pumps, neurostimulators, bone-growth stimulators, certain intrauterine contraceptive devices; or any other type of iron-based metal implants. We discussed the fact that if you are pregnant or suspect that you may be pregnant, you should notify your physician and consult with your primary care or obstetrician before having an MRI. Although rare, we talked about the fact that if contrast dye is used, there is a risk for allergic reaction to the dye. Patients who are allergic to or sensitive to medications, contrast dye, iodine, or shellfish should notify the radiologist or technologist prior to the administration of dye.  MRI contrast may also influence other conditions such as allergies, asthma, anemia, hypotension (low blood pressure), and sickle cell disease. The patient has expressed understanding of these risks and I will see the patient back after the MRI to discuss the findings as well as the treatment options. Patient has a long history of left shoulder issues that is getting grossly worse. We were able to elicit some clinical symptoms on physical exam today. We discussed with her that obtaining an MRI would help us further investigate the cause of her persistent shoulder issues. Patient will return to clinic after getting the MRI for discussion of results and management moving forward. SUBJECTIVE/OBJECTIVE:  Harrison Ernandez (: 1985) is a 44 y.o. female, patient,here for evaluation of the Shoulder Pain (left)  . 42-year-old female presents to the clinic today for evaluation of her left shoulder. Patient states that her left shoulder is becoming increasingly problematic to her. She feels a \"shift \". She denies any history of dislocation. She did have an incident 10 years ago in which she fell down the stairs and caught herself with her left upper extremity. She did not seek any consultation at that point. She does feel her shoulder pain and issues began at that time. Now, she is starting to have some issues with sleeping on her left side. She says her left shoulder feels weak. She is quite active with weekly workout routines and training. She denies numbness or tingling down her arm. PHYSICAL EXAM:    Patient is alert and oriented x3. She is in no acute distress. She walks with a nonantalgic gait. [place]:     Left shoulder: No deformity or step-off. Patient has full active and passive range of motion to the left shoulder. She has 5 out of 5 strength to forward flexion, internal rotation. She does have some side to side weakness with external rotation.   Patient did have a reproducible clicking in the left shoulder with passive assessment  This was mildly uncomfortable. Positive apprehension. Negative sulcus sign. IMAGING:    I have independently reviewed and interpreted the following images:     Radiographs of the left shoulder were taken and reviewed in office today. There is a concentric glenohumeral joint. There is no obvious findings of degenerative joint disease to the glenohumeral joint of the [facility] joint. Allergies   Allergen Reactions    Zoloft [Sertraline] Rash       Current Outpatient Medications   Medication Sig    spironolactone (ALDACTONE) 50 mg tablet 1 Tablet two (2) times a day. methylphenidate ER 18 mg 24 hr tab Take  by mouth every morning.    multivitamin (ONE A DAY) tablet Take 1 Tablet by mouth daily. methylphenidate ER 18 mg 24 hr tab Take 1 Tablet by mouth every morning. Max Daily Amount: 18 mg. buPROPion XL (WELLBUTRIN XL) 150 mg tablet Take 1 Tablet by mouth every morning. Indications: depression    [START ON 5/13/2023] methylphenidate ER 18 mg 24 hr tab Take 1 Tablet by mouth every morning. Max Daily Amount: 18 mg. [START ON 6/12/2023] methylphenidate ER 18 mg 24 hr tab Take 1 Tablet by mouth every morning. Max Daily Amount: 18 mg. [START ON 7/12/2023] methylphenidate ER 18 mg 24 hr tab Take 1 Tablet by mouth every morning. Max Daily Amount: 18 mg. [START ON 8/11/2023] methylphenidate ER 18 mg 24 hr tab Take 1 Tablet by mouth every morning. Max Daily Amount: 18 mg. [START ON 9/10/2023] methylphenidate ER 18 mg 24 hr tab Take 1 Tablet by mouth every morning. Max Daily Amount: 18 mg.    tretinoin (RETIN-A) 0.025 % topical cream 1 application     No current facility-administered medications for this visit.        Past Medical History:   Diagnosis Date    Abnormal Papanicolaou smear of cervix     WNL since    Attention deficit hyperactivity disorder (ADHD), predominantly inattentive type 3/22/2021    Headache     PCOS (polycystic ovarian syndrome)     Venous thrombosis 2016    Sinus       Past Surgical History:   Procedure Laterality Date    HX HYSTEROSCOPY  02/28/2020    Removal of imbedded IUD arm    HX OTHER SURGICAL      Sinus venous thrombosis       Family History   Problem Relation Age of Onset    Breast Cancer Mother 39    High Cholesterol Mother     Parkinson's Disease Father        Social History     Socioeconomic History    Marital status:      Spouse name: Not on file    Number of children: Not on file    Years of education: Not on file    Highest education level: Not on file   Occupational History    Not on file   Tobacco Use    Smoking status: Never    Smokeless tobacco: Never   Vaping Use    Vaping Use: Never used   Substance and Sexual Activity    Alcohol use: Yes     Comment: 2-4 drinks/week    Drug use: Never    Sexual activity: Yes     Partners: Male   Other Topics Concern     Service Not Asked    Blood Transfusions Not Asked    Caffeine Concern Not Asked    Occupational Exposure Not Asked    Hobby Hazards Not Asked    Sleep Concern Not Asked    Stress Concern Not Asked    Weight Concern Not Asked    Special Diet Not Asked    Back Care Not Asked    Exercise Not Asked    Bike Helmet Not Asked    Seat Belt Not Asked    Self-Exams Not Asked   Social History Narrative    Not on file     Social Determinants of Health     Financial Resource Strain: Not on file   Food Insecurity: Not on file   Transportation Needs: Not on file   Physical Activity: Not on file   Stress: Not on file   Social Connections: Not on file   Intimate Partner Violence: Not on file   Housing Stability: Not on file       Review of Systems    No flowsheet data found. Vitals:  Ht 5' 7\" (1.702 m)   Wt 152 lb (68.9 kg)   LMP 03/31/2023   BMI 23.81 kg/m²    Body mass index is 23.81 kg/m². An electronic signature was used to authenticate this note.   -- William John MD

## 2023-05-13 PROBLEM — Z00.00 WELL ADULT EXAM: Status: RESOLVED | Noted: 2021-06-24 | Resolved: 2023-05-13

## 2023-05-24 RX ORDER — METHYLPHENIDATE HYDROCHLORIDE 18 MG/1
18 TABLET, EXTENDED RELEASE ORAL
COMMUNITY
Start: 2023-04-13

## 2023-05-24 RX ORDER — BUPROPION HYDROCHLORIDE 150 MG/1
150 TABLET ORAL
COMMUNITY
Start: 2023-04-13

## 2023-05-24 RX ORDER — SPIRONOLACTONE 50 MG/1
50 TABLET, FILM COATED ORAL 2 TIMES DAILY
COMMUNITY
Start: 2023-01-27

## 2023-06-02 ENCOUNTER — HOSPITAL ENCOUNTER (OUTPATIENT)
Facility: HOSPITAL | Age: 38
Setting detail: SPECIMEN
Discharge: HOME | End: 2023-06-05
Payer: COMMERCIAL

## 2023-06-02 ENCOUNTER — OFFICE VISIT (OUTPATIENT)
Age: 38
End: 2023-06-02

## 2023-06-02 VITALS — BODY MASS INDEX: 23.49 KG/M2 | WEIGHT: 150 LBS | SYSTOLIC BLOOD PRESSURE: 102 MMHG | DIASTOLIC BLOOD PRESSURE: 60 MMHG

## 2023-06-02 DIAGNOSIS — Z01.419 WELL WOMAN EXAM: Primary | ICD-10-CM

## 2023-06-02 PROCEDURE — 87491 CHLMYD TRACH DNA AMP PROBE: CPT

## 2023-06-02 PROCEDURE — 87591 N.GONORRHOEAE DNA AMP PROB: CPT

## 2023-06-02 PROCEDURE — 87624 HPV HI-RISK TYP POOLED RSLT: CPT

## 2023-06-02 PROCEDURE — 88175 CYTOPATH C/V AUTO FLUID REDO: CPT

## 2023-06-02 PROCEDURE — 87661 TRICHOMONAS VAGINALIS AMPLIF: CPT

## 2023-06-02 NOTE — PROGRESS NOTES
Annual exam ages 20-44    Abida Larry is a No obstetric history on file. ,  43 y.o. female White (non-) Patient's last menstrual period was 05/28/2023 (exact date). .    She presents for her annual checkup. She is having concerns with menstrual cycles being heavy in flow along with cramping and pain. She states the length and regularity is normal, but extremely heavy with flow. Kids now ages 6, 11 and 2. Weaned a year ago.  vasectomy  Hx of cerebral thrombosis    Menstrual status:    Her periods are heavy in flow. She denies dysmenorrhea. She reports no premenstrual symptoms    Contraception:    The current method of family planning is vasectomy. Sexual history:     She  has no history on file for sexual activity. .    Medical conditions:    Since her last annual GYN exam about 2 years ago 1/2021, she has not the following changes in her health history: none. Pap and Mammogram History:    Her most recent Pap smear was WNL, obtained 2 year(s) ago. Breast Cancer History/Substance Abuse: negative    Past Medical History:   Diagnosis Date    Abnormal Papanicolaou smear of cervix     WNL since    Attention deficit hyperactivity disorder (ADHD), predominantly inattentive type 3/22/2021    Headache     PCOS (polycystic ovarian syndrome)     Venous thrombosis 2016    Sinus     Past Surgical History:   Procedure Laterality Date    HYSTEROSCOPY  02/28/2020    Removal of imbedded IUD arm    OTHER SURGICAL HISTORY      Sinus venous thrombosis       Current Outpatient Medications   Medication Sig Dispense Refill    buPROPion (WELLBUTRIN XL) 150 MG extended release tablet Take 1 tablet by mouth      Methylphenidate HCl ER 18 MG TB24 Take 18 mg by mouth. spironolactone (ALDACTONE) 50 MG tablet 1 tablet 2 times daily      tretinoin (RETIN-A) 7.813 % cream 1 application       No current facility-administered medications for this visit.      Allergies: Sertraline     Tobacco History:  reports

## 2023-06-07 LAB
C TRACH RRNA SPEC QL NAA+PROBE: NEGATIVE
N GONORRHOEA RRNA SPEC QL NAA+PROBE: NEGATIVE
SPECIMEN SOURCE: NORMAL
T VAGINALIS RRNA SPEC QL NAA+PROBE: NEGATIVE

## 2023-06-15 ENCOUNTER — TELEPHONE (OUTPATIENT)
Age: 38
End: 2023-06-15

## 2023-06-19 ENCOUNTER — HOSPITAL ENCOUNTER (OUTPATIENT)
Facility: HOSPITAL | Age: 38
Setting detail: RECURRING SERIES
Discharge: HOME | End: 2023-06-22
Payer: COMMERCIAL

## 2023-06-19 PROCEDURE — 97112 NEUROMUSCULAR REEDUCATION: CPT | Performed by: PHYSICAL THERAPIST

## 2023-06-19 NOTE — PROGRESS NOTES
PHYSICAL THERAPY - MEDICARE DAILY TREATMENT NOTE (updated 3/23)      Date: 2023          Patient Name:  Myron Garcia :  1985   Medical   Diagnosis:  Left shoulder pain [M25.512]  Neck pain [M54.2] Treatment Diagnosis:  M25.512  LEFT SHOULDER PAIN    Referral Source:  Eleazar Iniguez Insurance:   Payor: Eric Rodriguez / Plan: [address] / Product Type: *No Product type* /                     Patient  verified yes     Visit #   Current  / Total 2 30   Time   In / Out 105  PM   Total Treatment Time 55   Total Timed Codes 55   1:1 Treatment Time 54      [facility] Totals Reminder:  bill using total billable   min of TIMED therapeutic procedures and modalities. 8-22 min = 1 unit; 23-37 min = 2 units; 38-52 min = 3 units; 53-67 min = 4 units; 68-82 min = 5 units            SUBJECTIVE    Pain Level (0-10 scale): 0    Any medication changes, allergies to medications, adverse drug reactions, diagnosis change, or new procedure performed?: [x] No    [] Yes (see summary sheet for update)  Medications: Verified on Patient Summary List    Subjective functional status/changes:     Pt reports she went to body pump Wednesday and she could tell her L shoulder is less stable    OBJECTIVE      Therapeutic Procedures: Tx Min Billable or 1:1 Min (if diff from Tx Min) Procedure, Rationale, Specifics   55  [id number] Neuromuscular Re-Education (timed):  improve balance, coordination, kinesthetic sense, posture, core stability and proprioception to improve patient's ability to develop conscious control of individual muscles and awareness of position of extremities in order to progress to PLOF and address remaining functional goals.  (see flow sheet as applicable)     Details if applicable:     55     Total Total         Other Objective/Functional Measures  HAdDT +L (- after interventions)  HG IR + B (- after interventions)  Shoulder flexion - B    Some pain with B press down    Pain Level at end of session

## 2023-06-23 ENCOUNTER — OFFICE VISIT (OUTPATIENT)
Age: 38
End: 2023-06-23

## 2023-06-23 VITALS
WEIGHT: 146 LBS | HEIGHT: 67 IN | SYSTOLIC BLOOD PRESSURE: 100 MMHG | OXYGEN SATURATION: 97 % | TEMPERATURE: 98.1 F | HEART RATE: 58 BPM | DIASTOLIC BLOOD PRESSURE: 68 MMHG | RESPIRATION RATE: 18 BRPM | BODY MASS INDEX: 22.91 KG/M2

## 2023-06-23 DIAGNOSIS — R21 RASH OF BODY: Primary | ICD-10-CM

## 2023-06-23 RX ORDER — CLOBETASOL PROPIONATE 0.5 MG/G
OINTMENT TOPICAL
Qty: 15 G | Refills: 0 | Status: SHIPPED | OUTPATIENT
Start: 2023-06-23

## 2023-06-23 RX ORDER — HYDROXYZINE HYDROCHLORIDE 25 MG/1
25 TABLET, FILM COATED ORAL NIGHTLY
Qty: 30 TABLET | Refills: 0 | Status: SHIPPED | OUTPATIENT
Start: 2023-06-23 | End: 2023-07-23

## 2023-06-23 RX ORDER — CLOBETASOL PROPIONATE 0.5 MG/G
OINTMENT TOPICAL
Status: CANCELLED | OUTPATIENT
Start: 2023-06-23

## 2023-06-23 RX ORDER — CETIRIZINE HYDROCHLORIDE 10 MG/1
10 TABLET ORAL DAILY
Qty: 30 TABLET | Refills: 0 | Status: SHIPPED | OUTPATIENT
Start: 2023-06-23 | End: 2023-07-23

## 2023-06-23 RX ORDER — METHYLPREDNISOLONE 4 MG/1
TABLET ORAL
Qty: 1 KIT | Refills: 0 | Status: SHIPPED | OUTPATIENT
Start: 2023-06-23 | End: 2023-06-29

## 2023-06-23 ASSESSMENT — ENCOUNTER SYMPTOMS
DIARRHEA: 0
GASTROINTESTINAL NEGATIVE: 1
COUGH: 0
RESPIRATORY NEGATIVE: 1
SHORTNESS OF BREATH: 0
EYES NEGATIVE: 1

## 2023-06-28 ENCOUNTER — HOSPITAL ENCOUNTER (OUTPATIENT)
Facility: HOSPITAL | Age: 38
Setting detail: RECURRING SERIES
Discharge: HOME | End: 2023-07-01
Payer: COMMERCIAL

## 2023-06-28 PROCEDURE — 97112 NEUROMUSCULAR REEDUCATION: CPT | Performed by: PHYSICAL THERAPIST

## 2023-06-30 ENCOUNTER — APPOINTMENT (OUTPATIENT)
Facility: HOSPITAL | Age: 38
End: 2023-06-30
Payer: COMMERCIAL

## 2023-07-03 ENCOUNTER — HOSPITAL ENCOUNTER (OUTPATIENT)
Facility: HOSPITAL | Age: 38
Setting detail: RECURRING SERIES
Discharge: HOME | End: 2023-07-06
Payer: COMMERCIAL

## 2023-07-03 PROCEDURE — 97112 NEUROMUSCULAR REEDUCATION: CPT | Performed by: PHYSICAL THERAPIST

## 2023-07-03 NOTE — PROGRESS NOTES
PHYSICAL THERAPY - MEDICARE DAILY TREATMENT NOTE (updated 3/23)      Date: 7/3/2023          Patient Name:  Marilyn Patricio :  1985   Medical   Diagnosis:  Left shoulder pain [M25.512]  Neck pain [M54.2] Treatment Diagnosis:  M25.512  LEFT SHOULDER PAIN    Referral Source:  Milton Soto MD Insurance:   Payor: Yehuda Garay / Plan: [place] [address] / Product Type: *No Product type* /                     Patient  verified yes     Visit #   Current  / Total 4 30   Time   In / Out 1030 AM 1120 AM   Total Treatment Time 50   Total Timed Codes 50   1:1 Treatment Time 48      [facility] Totals Reminder:  bill using total billable   min of TIMED therapeutic procedures and modalities. 8-22 min = 1 unit; 23-37 min = 2 units; 38-52 min = 3 units; 53-67 min = 4 units; 68-82 min = 5 units            SUBJECTIVE    Pain Level (0-10 scale): 0    Any medication changes, allergies to medications, adverse drug reactions, diagnosis change, or new procedure performed?: [x] No    [] Yes (see summary sheet for update)  Medications: Verified on Patient Summary List    Subjective functional status/changes:     Pt reports she needs to go back on steroids because the itching from the poison ivy is coming back  Pt reports compliance with HEP \"I can find my left hamstring\"    OBJECTIVE      Therapeutic Procedures: Tx Min Billable or 1:1 Min (if diff from Tx Min) Procedure, Rationale, Specifics   50  [id number] Neuromuscular Re-Education (timed):  improve balance, coordination, kinesthetic sense, posture, core stability and proprioception to improve patient's ability to develop conscious control of individual muscles and awareness of position of extremities in order to progress to PLOF and address remaining functional goals.  (see flow sheet as applicable)     Details if applicable:     50     Total Total         Other Objective/Functional Measures  HAdDT - B  HAdLT R L1 L L1 (L2 after standing wall reach)  HG IR +R (-after supine

## 2023-07-10 ENCOUNTER — HOSPITAL ENCOUNTER (OUTPATIENT)
Facility: HOSPITAL | Age: 38
Setting detail: RECURRING SERIES
Discharge: HOME | End: 2023-07-13
Payer: COMMERCIAL

## 2023-07-10 PROCEDURE — 97140 MANUAL THERAPY 1/> REGIONS: CPT | Performed by: PHYSICAL THERAPIST

## 2023-07-10 PROCEDURE — 97112 NEUROMUSCULAR REEDUCATION: CPT | Performed by: PHYSICAL THERAPIST

## 2023-07-10 NOTE — PROGRESS NOTES
PHYSICAL THERAPY - MEDICARE DAILY TREATMENT NOTE (updated 3/23)      Date: 7/10/2023          Patient Name:  Gris Newberry :  1985   Medical   Diagnosis:  Left shoulder pain [M25.512]  Neck pain [M54.2] Treatment Diagnosis:  M25.512  LEFT SHOULDER PAIN    Referral Source:  Bina Rivera MD Insurance:   Payor: Beverley Babinski / Plan: Ernesto Desai / Product Type: *No Product type* /                     Patient  verified yes     Visit #   Current  / Total 5 30   Time   In / Out 1035 AM 1130 AM   Total Treatment Time 55   Total Timed Codes 55   1:1 Treatment Time 54      [facility] Totals Reminder:  bill using total billable   min of TIMED therapeutic procedures and modalities. 8-22 min = 1 unit; 23-37 min = 2 units; 38-52 min = 3 units; 53-67 min = 4 units; 68-82 min = 5 units            SUBJECTIVE    Pain Level (0-10 scale): 0    Any medication changes, allergies to medications, adverse drug reactions, diagnosis change, or new procedure performed?: [x] No    [] Yes (see summary sheet for update)  Medications: Verified on Patient Summary List    Subjective functional status/changes:     Pt reports she has not been compliant with HEP because of her busy schedule and some stress    OBJECTIVE      Therapeutic Procedures: Tx Min Billable or 1:1 Min (if diff from Tx Min) Procedure, Rationale, Specifics   10  [id number] Manual Therapy (timed):  decrease pain, increase ROM, increase tissue extensibility, and decrease trigger points to improve patient's ability to progress to PLOF and address remaining functional goals. The manual therapy interventions were performed at a separate and distinct time from the therapeutic activities interventions .  (see flow sheet as applicable)     Details if applicable:  Rib pumping   45  [id number] Neuromuscular Re-Education (timed):  improve balance, coordination, kinesthetic sense, posture, core stability and proprioception to improve patient's ability to develop conscious

## 2023-07-17 ENCOUNTER — APPOINTMENT (OUTPATIENT)
Facility: HOSPITAL | Age: 38
End: 2023-07-17
Payer: COMMERCIAL

## 2023-07-18 ENCOUNTER — HOSPITAL ENCOUNTER (OUTPATIENT)
Facility: HOSPITAL | Age: 38
Setting detail: SPECIMEN
Discharge: HOME | End: 2023-07-21

## 2023-07-18 ENCOUNTER — OFFICE VISIT (OUTPATIENT)
Age: 38
End: 2023-07-18

## 2023-07-18 VITALS — BODY MASS INDEX: 23.34 KG/M2 | DIASTOLIC BLOOD PRESSURE: 68 MMHG | WEIGHT: 149 LBS | SYSTOLIC BLOOD PRESSURE: 110 MMHG

## 2023-07-18 DIAGNOSIS — N92.4 EXCESSIVE BLEEDING IN PREMENOPAUSAL PERIOD: Primary | ICD-10-CM

## 2023-07-18 LAB
HCG, PREGNANCY, URINE, POC: NEGATIVE
VALID INTERNAL CONTROL, POC: YES

## 2023-07-18 NOTE — PROGRESS NOTES
General problem visit    Michelle Pettit is a 43 y.o. female who is here following an US for menorrhagia. Having mildly irregular cycles; soaking thru double protection and limiting activity. Known PCOS. Hx thrombus and IUD complication    US today shows the following:  TV ULTRASOUND  THE UTERUS IS RETROVERTED, NORMAL IN SIZE AND ECHOGENICITY. THE ENDOMETRIUM MEASURES 11. 5MM IN THICKNESS. NO MASSES OR ABNORMALITIES ARE SEEN. RIGHT OVARY APPEARS TO HAVE MULTIPLE FOLLICLES SEEN ON THE PERIPHERY OF THE OVARY. LEFT OVARY APPEARS WNL. SMALL AMOUNT FREE FLUID IS SEEN IN THE CDS. Her relevant past medical history:   Past Medical History:   Diagnosis Date    Abnormal Papanicolaou smear of cervix     WNL since    Attention deficit hyperactivity disorder (ADHD), predominantly inattentive type 3/22/2021    Headache     PCOS (polycystic ovarian syndrome)     Venous thrombosis 2016    Sinus        Past Surgical History:   Procedure Laterality Date    HYSTEROSCOPY  02/28/2020    Removal of imbedded IUD arm    OTHER SURGICAL HISTORY      Sinus venous thrombosis     Social History     Occupational History    Not on file   Tobacco Use    Smoking status: Never    Smokeless tobacco: Never   Vaping Use    Vaping Use: Never used   Substance and Sexual Activity    Alcohol use: Yes     Comment: Rarely drink    Drug use: Never    Sexual activity: Yes     Partners: Male     Birth control/protection: Surgical     Comment:  has vasectomy     Family History   Problem Relation Age of Onset    Parkinson's Disease Father     High Cholesterol Mother     Breast Cancer Mother     Cancer Mother        Allergies   Allergen Reactions    Sertraline Rash     Prior to Admission medications    Medication Sig Start Date End Date Taking?  Authorizing Provider   cetirizine (ZYRTEC) 10 MG tablet Take 1 tablet by mouth daily 6/23/23 7/23/23 Yes ROMANA Guevara CNP   hydrOXYzine HCl (ATARAX) 25 MG tablet Take 1 tablet by mouth

## 2023-07-21 ENCOUNTER — HOSPITAL ENCOUNTER (OUTPATIENT)
Facility: HOSPITAL | Age: 38
Setting detail: RECURRING SERIES
Discharge: HOME | End: 2023-07-24
Payer: COMMERCIAL

## 2023-07-21 PROCEDURE — 97112 NEUROMUSCULAR REEDUCATION: CPT | Performed by: PHYSICAL THERAPIST

## 2023-07-21 NOTE — PROGRESS NOTES
Physical Therapy at [facility],   a part of [address] [place] [address] [address]  Phone: [phone]  Fax: [phone]  PHYSICAL THERAPY PROGRESS NOTE AND UPDATED POC  Patient Name:  Jaclyn Macias :  1985   Treatment/Medical Diagnosis: Left shoulder pain [M25.512]  Neck pain [M54.2]   Referral Source:  Ghislaine Mendoza MD     Date of Initial Visit:  23 Attended Visits:  6 Missed Visits:  0     SUMMARY OF TREATMENT/ASSESSMENT:  The patient has completed 6 PT visits and has made limited progress towards goals secondary to inability to complete HEP independently due to time constraints and stress. She would like to resume PT when she has time to devote to her shoulder stabilization program once her children return to school. CURRENT STATUS    Short Term Goals: To be accomplished in 4 weeks NOT MET  Pt will demonstrate competency and understanding with HEP  Pt will improve ER/IR strength to 4/5 to allow for greater strength and ease carrying child   Pt will hold modified plank position for 1 min without an increase in symptoms to improve ability to play on ground with children a  Long Term Goals: To be accomplished in 12 weeks NOT MET, UPDATE TO 18 VISITS  Pt will lift 10 lbs overhead without an increase in symptoms to allow for household chores without difficulty  Pt will lift child from the ground without an increase in symptoms to allow for motherly duties  Pt will have - apprehension test to indicate stability in the shoulder in overhead positions. RECOMMENDATIONS  Continue PT for 12 additional visits. Dennys Díaz, PT       2023       1:01 PM    If you have any questions/comments please contact us directly at [phone]. Thank you for allowing us to assist in the care of your patient.
and proprioception to improve patient's ability to develop conscious control of individual muscles and awareness of position of extremities in order to progress to PLOF and address remaining functional goals. (see flow sheet as applicable)   45     Total Total   Modalities Rationale:     decrease inflammation and decrease pain to improve patient's ability to progress to PLOF and address remaining functional goals. min [] Estim Unattended,             type/location:       []  w/ice    []  w/heat        min [] Estim Attended,             type/location:       []  w/ice   []  w/heat         []  w/US   []  TENS insruct            min []  Mechanical Traction,        type/lbs:        []  pro      []  sup           []  int       []  cont            []  before manual           []  after manual     min []  Ultrasound,         settings/location:     15 min  unbilled [x]  Ice     []  Heat            location/position: seated, L shoulder         min []  Vasopneumatic Device,      press/temp:   pre-treatment girth :    post-treatment girth :    measured at (landmark       location) :    If using vaso (only need to measure limb vaso being performed on)        min []  Other:        Skin assessment post-treatment (if applicable):    [x]  intact    []  redness- no adverse reaction                 []redness - adverse reaction:        Other Objective/Functional Measures  HAdDT - B  HAdLT R L1 L L1 NT  HG IR - B  Shoulder flexion +L - L after interventions  Pain with side plank supported on forearm  Good apical expansion following tactile and verbal instruction    Pain Level at end of session (0-10 scale): 0      Assessment   Patient will continue to benefit from skilled PT / OT services to modify and progress therapeutic interventions, analyze and address functional mobility deficits, analyze and address ROM deficits, analyze and address strength deficits, analyze and address soft tissue restrictions, analyze and cue for proper

## 2023-08-01 ENCOUNTER — HOSPITAL ENCOUNTER (OUTPATIENT)
Facility: HOSPITAL | Age: 38
Setting detail: RECURRING SERIES
Discharge: HOME | End: 2023-08-04
Payer: COMMERCIAL

## 2023-08-01 PROCEDURE — 97112 NEUROMUSCULAR REEDUCATION: CPT | Performed by: PHYSICAL THERAPIST

## 2023-08-01 NOTE — PROGRESS NOTES
PHYSICAL THERAPY - MEDICARE DAILY TREATMENT NOTE (updated 3/23)      Date: 2023          Patient Name:  Deborah James :  1985   Medical   Diagnosis:  Left shoulder pain [M25.512]  Neck pain [M54.2] Treatment Diagnosis:  M25.512  LEFT SHOULDER PAIN    Referral Source:  Marychuy Cash MD Insurance:   Payor: Merary Cee / Plan: [employer] [place] / Product Type: *No Product type* /                     Patient  verified yes     Visit #   Current  / Total 7 30   Time   In / Out 115 PM 1215 PM   Total Treatment Time 60   Total Timed Codes 45   1:1 Treatment Time 39      [facility] Totals Reminder:  bill using total billable   min of TIMED therapeutic procedures and modalities. 8-22 min = 1 unit; 23-37 min = 2 units; 38-52 min = 3 units; 53-67 min = 4 units; 68-82 min = 5 units            SUBJECTIVE    Pain Level (0-10 scale): 0    Any medication changes, allergies to medications, adverse drug reactions, diagnosis change, or new procedure performed?: [x] No    [] Yes (see summary sheet for update)  Medications: Verified on Patient Summary List    Subjective functional status/changes:     Pt reports she was a little sore after last visit. Pt reports she has not been that compliant with HEP. OBJECTIVE      Therapeutic Procedures: Tx Min Billable or 1:1 Min (if diff from Tx Min) Procedure, Rationale, Specifics     01639 Manual Therapy (timed):  decrease pain, increase ROM, increase tissue extensibility, and decrease trigger points to improve patient's ability to progress to PLOF and address remaining functional goals. The manual therapy interventions were performed at a separate and distinct time from the therapeutic activities interventions .  (see flow sheet as applicable)     Details if applicable:  Rib pumping   45  31371 Neuromuscular Re-Education (timed):  improve balance, coordination, kinesthetic sense, posture, core stability and proprioception to improve patient's ability to develop

## 2023-08-14 ENCOUNTER — PREP FOR PROCEDURE (OUTPATIENT)
Facility: HOSPITAL | Age: 38
End: 2023-08-14

## 2023-08-14 ENCOUNTER — HOSPITAL ENCOUNTER (OUTPATIENT)
Facility: HOSPITAL | Age: 38
Setting detail: RECURRING SERIES
Discharge: HOME | End: 2023-08-17
Payer: COMMERCIAL

## 2023-08-14 DIAGNOSIS — N92.0 MENORRHAGIA WITH REGULAR CYCLE: Primary | ICD-10-CM

## 2023-08-14 PROCEDURE — 97112 NEUROMUSCULAR REEDUCATION: CPT | Performed by: PHYSICAL THERAPIST

## 2023-08-14 NOTE — PROGRESS NOTES
PHYSICAL THERAPY - MEDICARE DAILY TREATMENT NOTE (updated 3/23)      Date: 2023          Patient Name:  Jaclyn Macias :  1985   Medical   Diagnosis:  Left shoulder pain [M25.512]  Neck pain [M54.2] Treatment Diagnosis:  M25.512  LEFT SHOULDER PAIN    Referral Source:  Ghislaine Mendoza MD Insurance:   Payor: Josefina John / Plan: Muna Marquise [facility] [place] / Product Type: *No Product type* /                     Patient  verified yes     Visit #   Current  / Total 8 30   Time   In / Out 130  PM   Total Treatment Time 50   Total Timed Codes 50   1:1 Treatment Time 48      [facility] Totals Reminder:  bill using total billable   min of TIMED therapeutic procedures and modalities. 8-22 min = 1 unit; 23-37 min = 2 units; 38-52 min = 3 units; 53-67 min = 4 units; 68-82 min = 5 units            SUBJECTIVE    Pain Level (0-10 scale): 0    Any medication changes, allergies to medications, adverse drug reactions, diagnosis change, or new procedure performed?: [x] No    [] Yes (see summary sheet for update)  Medications: Verified on Patient Summary List    Subjective functional status/changes:     Pt reports she has not had a chance to practice her HEP    OBJECTIVE      Therapeutic Procedures: Tx Min Billable or 1:1 Min (if diff from Tx Min) Procedure, Rationale, Specifics     62069 Manual Therapy (timed):  decrease pain, increase ROM, increase tissue extensibility, and decrease trigger points to improve patient's ability to progress to PLOF and address remaining functional goals. The manual therapy interventions were performed at a separate and distinct time from the therapeutic activities interventions .  (see flow sheet as applicable)     Details if applicable:  Rib pumping   50  10299 Neuromuscular Re-Education (timed):  improve balance, coordination, kinesthetic sense, posture, core stability and proprioception to improve patient's ability to develop conscious control of individual muscles and awareness

## 2023-08-29 ENCOUNTER — HOSPITAL ENCOUNTER (OUTPATIENT)
Facility: HOSPITAL | Age: 38
Setting detail: RECURRING SERIES
Discharge: HOME | End: 2023-09-01
Payer: COMMERCIAL

## 2023-08-29 DIAGNOSIS — F90.0 ATTENTION-DEFICIT HYPERACTIVITY DISORDER, PREDOMINANTLY INATTENTIVE TYPE: ICD-10-CM

## 2023-08-29 DIAGNOSIS — Z13.1 SCREENING FOR DIABETES MELLITUS (DM): ICD-10-CM

## 2023-08-29 DIAGNOSIS — Z13.220 SCREENING FOR HYPERLIPIDEMIA: ICD-10-CM

## 2023-08-29 LAB
ALBUMIN SERPL-MCNC: 3.8 G/DL (ref 3.5–5)
ALBUMIN/GLOB SERPL: 1.3 (ref 1.1–2.2)
ALP SERPL-CCNC: 41 U/L (ref 45–117)
ALT SERPL-CCNC: 22 U/L (ref 12–78)
ANION GAP SERPL CALC-SCNC: 3 MMOL/L (ref 5–15)
AST SERPL-CCNC: 14 U/L (ref 15–37)
BILIRUB SERPL-MCNC: 0.5 MG/DL (ref 0.2–1)
BUN SERPL-MCNC: 16 MG/DL (ref 6–20)
BUN/CREAT SERPL: 19 (ref 12–20)
CALCIUM SERPL-MCNC: 9.2 MG/DL (ref 8.5–10.1)
CHLORIDE SERPL-SCNC: 106 MMOL/L (ref 97–108)
CHOLEST SERPL-MCNC: 136 MG/DL
CO2 SERPL-SCNC: 30 MMOL/L (ref 21–32)
CREAT SERPL-MCNC: 0.86 MG/DL (ref 0.55–1.02)
GLOBULIN SER CALC-MCNC: 3 G/DL (ref 2–4)
GLUCOSE SERPL-MCNC: 100 MG/DL (ref 65–100)
HDLC SERPL-MCNC: 54 MG/DL
HDLC SERPL: 2.5 (ref 0–5)
LDLC SERPL CALC-MCNC: 74 MG/DL (ref 0–100)
POTASSIUM SERPL-SCNC: 4.2 MMOL/L (ref 3.5–5.1)
PROT SERPL-MCNC: 6.8 G/DL (ref 6.4–8.2)
SODIUM SERPL-SCNC: 139 MMOL/L (ref 136–145)
TRIGL SERPL-MCNC: 40 MG/DL
VLDLC SERPL CALC-MCNC: 8 MG/DL

## 2023-08-29 PROCEDURE — 97112 NEUROMUSCULAR REEDUCATION: CPT | Performed by: PHYSICAL THERAPIST

## 2023-08-29 PROCEDURE — 97140 MANUAL THERAPY 1/> REGIONS: CPT | Performed by: PHYSICAL THERAPIST

## 2023-08-29 NOTE — PROGRESS NOTES
Physical Therapy at [facility],   a part of [address] [place] [address]  Phone: [phone]  Fax: [phone]  PHYSICAL THERAPY PROGRESS NOTE AND UPDATED POC  Patient Name:  Donna Quinones :  1985   Treatment/Medical Diagnosis: Left shoulder pain [M25.512]  Neck pain [M54.2]   Referral Source:  Gerson Ni MD     Date of Initial Visit:  23 Attended Visits:  9 Missed Visits:  0     SUMMARY OF TREATMENT/ASSESSMENT:  The patient has completed 9 PT visits and has made limited progress towards goals secondary to inability to complete HEP independently due to time constraints, stress and recent COVID diagnosis. She would like to resume PT when she has time to devote to her shoulder stabilization program once her children return to school. CURRENT STATUS    Short Term Goals: To be accomplished in 4 weeks NOT MET  Pt will demonstrate competency and understanding with HEP  Pt will improve ER/IR strength to 4/5 to allow for greater strength and ease carrying child   Pt will hold modified plank position for 1 min without an increase in symptoms to improve ability to play on ground with children a  Long Term Goals: To be accomplished in 12 weeks NOT MET, UPDATE TO 18 VISITS  Pt will lift 10 lbs overhead without an increase in symptoms to allow for household chores without difficulty  Pt will lift child from the ground without an increase in symptoms to allow for motherly duties  Pt will have - apprehension test to indicate stability in the shoulder in overhead positions. RECOMMENDATIONS  Continue PT for 9 additional visits. Tena Rodriguez, PT       2023       11:27 AM    If you have any questions/comments please contact us directly at [phone]. Thank you for allowing us to assist in the care of your patient.
address imbalance/dizziness, and instruct in home and community integration to address functional deficits and attain remaining goals. Progress toward goals / Updated goals:  [x]  See Progress Note/Recertification  Pt plans to initiate practice of HEP this visit. Good shoulder flexion with manual shoulder blade depression and following L pec release    Short Term Goals: To be accomplished in 4 weeks NOT MET  Pt will demonstrate competency and understanding with HEP  Pt will improve ER/IR strength to 4/5 to allow for greater strength and ease carrying child   Pt will hold modified plank position for 1 min without an increase in symptoms to improve ability to play on ground with children a  Long Term Goals: To be accomplished in 12 weeks UPDATE TO 18 VISITS  Pt will lift 10 lbs overhead without an increase in symptoms to allow for household chores without difficulty  Pt will lift child from the ground without an increase in symptoms to allow for motherly duties  Pt will have - apprehension test to indicate stability in the shoulder in overhead positions.    PLAN  Yes  Continue plan of care  Re-Cert Due: n/a  [x]  Upgrade activities as tolerated  []  Discharge due to :  []  Other:      Juan Manuel Rao, PT       8/29/2023       10:18 AM

## 2023-08-30 LAB
EST. AVERAGE GLUCOSE BLD GHB EST-MCNC: 111 MG/DL
HBA1C MFR BLD: 5.5 % (ref 4–5.6)

## 2023-09-05 ENCOUNTER — TELEPHONE (OUTPATIENT)
Age: 38
End: 2023-09-05

## 2023-09-05 RX ORDER — TRIFAROTENE 50 UG/G
CREAM TOPICAL AS NEEDED
COMMUNITY

## 2023-09-05 RX ORDER — DAPSONE 50 MG/G
GEL TOPICAL AS NEEDED
COMMUNITY

## 2023-09-05 NOTE — TELEPHONE ENCOUNTER
PAT RN calling to share that this patient tested positive for COVID on 8/19 and negative on 8/29. She is scheduled for surgery on 9/11 at [facility] OR. No further information needed at this time from PAT.

## 2023-09-05 NOTE — PERIOP NOTE
[address] INSTRUCTIONS    Surgery Date:   9-11-23    Your surgeon's office or [facility] staff will call you between 4 PM- 8 PM the day before surgery with your arrival time. If your surgery is on a Monday, you will receive a call the preceding Friday. Please report to [facility] Patient Access/Admitting on the 1st floor. Bring your insurance card, photo identification, and any copayment ( if applicable). If you are going home the same day of your surgery, you must have a responsible adult to drive you home. You need to have a responsible adult to stay with you the first 24 hours after surgery and you should not drive a car for 24 hours following your surgery. Nothing to eat or drink after midnight the night before surgery. This includes no water, gum, mints, coffee, juice, etc.  Please note special instructions, if applicable, below for medications. Do NOT drink alcohol or smoke 24 hours before surgery. STOP smoking for 14 days prior as it helps with breathing and healing after surgery. If you are being admitted to the hospital, please leave personal belongings/luggage in your car until you have an assigned hospital room number. Please wear comfortable clothes. Wear your glasses instead of contacts. We ask that all money, jewelry and valuables be left at home. Wear no make up, particularly mascara, the day of surgery. All body piercings, rings, and jewelry need to be removed and left at home. Please remove any nail polish or artificial nails from your fingernails. Please wear your hair loose or down. Please no pony-tails, buns, or any metal hair accessories. If you shower the morning of surgery, please do not apply any lotions or powders afterwards. You may wear deodorant, unless having breast surgery. Do not shave any body area within 24 hours of your surgery. Please follow all instructions to avoid any potential surgical cancellation.   Should your physical condition change,

## 2023-09-06 ENCOUNTER — HOSPITAL ENCOUNTER (OUTPATIENT)
Facility: HOSPITAL | Age: 38
Setting detail: RECURRING SERIES
Discharge: HOME | End: 2023-09-09
Payer: COMMERCIAL

## 2023-09-06 PROCEDURE — 97112 NEUROMUSCULAR REEDUCATION: CPT | Performed by: PHYSICAL THERAPIST

## 2023-09-06 NOTE — PROGRESS NOTES
PHYSICAL THERAPY - MEDICARE DAILY TREATMENT NOTE (updated 3/23)      Date: 2023          Patient Name:  Manfred Olszewski :  1985   Medical   Diagnosis:  Left shoulder pain [M25.512]  Neck pain [M54.2] Treatment Diagnosis:  M25.512  LEFT SHOULDER PAIN    Referral Source:  Paola Newton MD Insurance:   Payor: [employer] / Plan: [employer] / Product Type: *No Product type* /                     Patient  verified yes     Visit #   Current  / Total 10 30   Time   In / Out 1025 AM 1100 AM   Total Treatment Time 35   Total Timed Codes 35   1:1 Treatment Time 30      [facility] Totals Reminder:  bill using total billable   min of TIMED therapeutic procedures and modalities. 8-22 min = 1 unit; 23-37 min = 2 units; 38-52 min = 3 units; 53-67 min = 4 units; 68-82 min = 5 units            SUBJECTIVE    Pain Level (0-10 scale): 0    Any medication changes, allergies to medications, adverse drug reactions, diagnosis change, or new procedure performed?: [x] No    [] Yes (see summary sheet for update)  Medications: Verified on Patient Summary List    Subjective functional status/changes:     Pt reports she practiced her exercises almost every day    OBJECTIVE      Therapeutic Procedures: Tx Min Billable or 1:1 Min (if diff from Tx Min) Procedure, Rationale, Specifics   5 5 [id number] Manual Therapy (timed):  decrease pain, increase ROM, increase tissue extensibility, and decrease trigger points to improve patient's ability to progress to PLOF and address remaining functional goals. The manual therapy interventions were performed at a separate and distinct time from the therapeutic activities interventions .  (see flow sheet as applicable)     Details if applicable:  Rib pumping   30  60348 Neuromuscular Re-Education (timed):  improve balance, coordination, kinesthetic sense, posture, core stability and proprioception to improve patient's ability to develop conscious control of individual muscles and

## 2023-09-08 ENCOUNTER — ANESTHESIA EVENT (OUTPATIENT)
Facility: HOSPITAL | Age: 38
End: 2023-09-08
Payer: COMMERCIAL

## 2023-09-08 NOTE — H&P
Rosanna Calalway is a 43 y.o. female who is here following an US for menorrhagia and negative pipelle. Having mildly irregular cycles; soaking thru double protection and limiting activity. Known PCOS. Hx thrombus and IUD complication     US today shows the following:  TV ULTRASOUND  THE UTERUS IS RETROVERTED, NORMAL IN SIZE AND ECHOGENICITY. THE ENDOMETRIUM MEASURES 11. 5MM IN THICKNESS. NO MASSES OR ABNORMALITIES ARE SEEN. RIGHT OVARY APPEARS TO HAVE MULTIPLE FOLLICLES SEEN ON THE PERIPHERY OF THE OVARY. LEFT OVARY APPEARS WNL. SMALL AMOUNT FREE FLUID IS SEEN IN THE CDS. Her relevant past medical history:   Past Medical History        Past Medical History:   Diagnosis Date    Abnormal Papanicolaou smear of cervix       WNL since    Attention deficit hyperactivity disorder (ADHD), predominantly inattentive type 3/22/2021    Headache      PCOS (polycystic ovarian syndrome)      Venous thrombosis 2016     Sinus            Past Surgical History         Past Surgical History:   Procedure Laterality Date    HYSTEROSCOPY   02/28/2020     Removal of imbedded IUD arm    OTHER SURGICAL HISTORY         Sinus venous thrombosis         Social History            Occupational History    Not on file   Tobacco Use    Smoking status: Never    Smokeless tobacco: Never   Vaping Use    Vaping Use: Never used   Substance and Sexual Activity    Alcohol use: Yes       Comment: Rarely drink    Drug use: Never    Sexual activity: Yes       Partners: Male       Birth control/protection: Surgical       Comment:  has vasectomy      Family History         Family History   Problem Relation Age of Onset    Parkinson's Disease Father      High Cholesterol Mother      Breast Cancer Mother      Cancer Mother                   Allergies   Allergen Reactions    Sertraline Rash      Home Medications           Prior to Admission medications    Medication Sig Start Date End Date Taking?  Authorizing Provider   cetirizine (ZYRTEC) 10 MG

## 2023-09-11 ENCOUNTER — ANESTHESIA (OUTPATIENT)
Facility: HOSPITAL | Age: 38
End: 2023-09-11
Payer: COMMERCIAL

## 2023-09-11 ENCOUNTER — HOSPITAL ENCOUNTER (OUTPATIENT)
Facility: HOSPITAL | Age: 38
Setting detail: OUTPATIENT SURGERY
Discharge: HOME | End: 2023-09-11
Attending: OBSTETRICS & GYNECOLOGY | Admitting: OBSTETRICS & GYNECOLOGY
Payer: COMMERCIAL

## 2023-09-11 VITALS
WEIGHT: 149.91 LBS | DIASTOLIC BLOOD PRESSURE: 67 MMHG | TEMPERATURE: 97.8 F | OXYGEN SATURATION: 100 % | RESPIRATION RATE: 14 BRPM | BODY MASS INDEX: 23.53 KG/M2 | HEIGHT: 67 IN | SYSTOLIC BLOOD PRESSURE: 101 MMHG | HEART RATE: 62 BPM

## 2023-09-11 DIAGNOSIS — N92.0 MENORRHAGIA WITH REGULAR CYCLE: ICD-10-CM

## 2023-09-11 LAB — HCG UR QL: NEGATIVE

## 2023-09-11 PROCEDURE — 6360000002 HC RX W HCPCS: Performed by: ANESTHESIOLOGY

## 2023-09-11 PROCEDURE — 2580000003 HC RX 258: Performed by: ANESTHESIOLOGY

## 2023-09-11 PROCEDURE — 3600000014 HC SURGERY LEVEL 4 ADDTL 15MIN: Performed by: OBSTETRICS & GYNECOLOGY

## 2023-09-11 PROCEDURE — 3600000004 HC SURGERY LEVEL 4 BASE: Performed by: OBSTETRICS & GYNECOLOGY

## 2023-09-11 PROCEDURE — 58563 HYSTEROSCOPY ABLATION: CPT | Performed by: OBSTETRICS & GYNECOLOGY

## 2023-09-11 PROCEDURE — 7100000000 HC PACU RECOVERY - FIRST 15 MIN: Performed by: OBSTETRICS & GYNECOLOGY

## 2023-09-11 PROCEDURE — 7100000010 HC PHASE II RECOVERY - FIRST 15 MIN: Performed by: OBSTETRICS & GYNECOLOGY

## 2023-09-11 PROCEDURE — 2709999900 HC NON-CHARGEABLE SUPPLY: Performed by: OBSTETRICS & GYNECOLOGY

## 2023-09-11 PROCEDURE — 2720000010 HC SURG SUPPLY STERILE: Performed by: OBSTETRICS & GYNECOLOGY

## 2023-09-11 PROCEDURE — 88305 TISSUE EXAM BY PATHOLOGIST: CPT

## 2023-09-11 PROCEDURE — 2500000003 HC RX 250 WO HCPCS: Performed by: ANESTHESIOLOGY

## 2023-09-11 PROCEDURE — 81025 URINE PREGNANCY TEST: CPT

## 2023-09-11 PROCEDURE — 3700000000 HC ANESTHESIA ATTENDED CARE: Performed by: OBSTETRICS & GYNECOLOGY

## 2023-09-11 PROCEDURE — 6370000000 HC RX 637 (ALT 250 FOR IP): Performed by: ANESTHESIOLOGY

## 2023-09-11 PROCEDURE — 7100000001 HC PACU RECOVERY - ADDTL 15 MIN: Performed by: OBSTETRICS & GYNECOLOGY

## 2023-09-11 PROCEDURE — 3700000001 HC ADD 15 MINUTES (ANESTHESIA): Performed by: OBSTETRICS & GYNECOLOGY

## 2023-09-11 RX ORDER — PROPOFOL 10 MG/ML
INJECTION, EMULSION INTRAVENOUS PRN
Status: DISCONTINUED | OUTPATIENT
Start: 2023-09-11 | End: 2023-09-11 | Stop reason: SDUPTHER

## 2023-09-11 RX ORDER — HYDROMORPHONE HYDROCHLORIDE 1 MG/ML
0.5 INJECTION, SOLUTION INTRAMUSCULAR; INTRAVENOUS; SUBCUTANEOUS EVERY 5 MIN PRN
Status: DISCONTINUED | OUTPATIENT
Start: 2023-09-11 | End: 2023-09-11 | Stop reason: HOSPADM

## 2023-09-11 RX ORDER — MIDAZOLAM HYDROCHLORIDE 1 MG/ML
INJECTION INTRAMUSCULAR; INTRAVENOUS PRN
Status: DISCONTINUED | OUTPATIENT
Start: 2023-09-11 | End: 2023-09-11 | Stop reason: SDUPTHER

## 2023-09-11 RX ORDER — PROCHLORPERAZINE EDISYLATE 5 MG/ML
5 INJECTION INTRAMUSCULAR; INTRAVENOUS
Status: DISCONTINUED | OUTPATIENT
Start: 2023-09-11 | End: 2023-09-11 | Stop reason: HOSPADM

## 2023-09-11 RX ORDER — ONDANSETRON 2 MG/ML
INJECTION INTRAMUSCULAR; INTRAVENOUS PRN
Status: DISCONTINUED | OUTPATIENT
Start: 2023-09-11 | End: 2023-09-11 | Stop reason: SDUPTHER

## 2023-09-11 RX ORDER — ONDANSETRON 2 MG/ML
4 INJECTION INTRAMUSCULAR; INTRAVENOUS
Status: COMPLETED | OUTPATIENT
Start: 2023-09-11 | End: 2023-09-11

## 2023-09-11 RX ORDER — PHENYLEPHRINE HCL IN 0.9% NACL 0.4MG/10ML
SYRINGE (ML) INTRAVENOUS PRN
Status: DISCONTINUED | OUTPATIENT
Start: 2023-09-11 | End: 2023-09-11 | Stop reason: SDUPTHER

## 2023-09-11 RX ORDER — LIDOCAINE HYDROCHLORIDE 10 MG/ML
1 INJECTION, SOLUTION EPIDURAL; INFILTRATION; INTRACAUDAL; PERINEURAL
Status: DISCONTINUED | OUTPATIENT
Start: 2023-09-11 | End: 2023-09-11 | Stop reason: HOSPADM

## 2023-09-11 RX ORDER — OXYCODONE HYDROCHLORIDE 5 MG/1
5 TABLET ORAL
Status: DISCONTINUED | OUTPATIENT
Start: 2023-09-11 | End: 2023-09-11 | Stop reason: HOSPADM

## 2023-09-11 RX ORDER — LIDOCAINE HYDROCHLORIDE 20 MG/ML
INJECTION, SOLUTION EPIDURAL; INFILTRATION; INTRACAUDAL; PERINEURAL PRN
Status: DISCONTINUED | OUTPATIENT
Start: 2023-09-11 | End: 2023-09-11 | Stop reason: SDUPTHER

## 2023-09-11 RX ORDER — SODIUM CHLORIDE 0.9 % (FLUSH) 0.9 %
5-40 SYRINGE (ML) INJECTION PRN
Status: DISCONTINUED | OUTPATIENT
Start: 2023-09-11 | End: 2023-09-11 | Stop reason: HOSPADM

## 2023-09-11 RX ORDER — SODIUM CHLORIDE 9 MG/ML
INJECTION, SOLUTION INTRAVENOUS PRN
Status: DISCONTINUED | OUTPATIENT
Start: 2023-09-11 | End: 2023-09-11 | Stop reason: HOSPADM

## 2023-09-11 RX ORDER — HYDRALAZINE HYDROCHLORIDE 20 MG/ML
10 INJECTION INTRAMUSCULAR; INTRAVENOUS
Status: DISCONTINUED | OUTPATIENT
Start: 2023-09-11 | End: 2023-09-11 | Stop reason: HOSPADM

## 2023-09-11 RX ORDER — MIDAZOLAM HYDROCHLORIDE 2 MG/2ML
2 INJECTION, SOLUTION INTRAMUSCULAR; INTRAVENOUS
Status: DISCONTINUED | OUTPATIENT
Start: 2023-09-11 | End: 2023-09-11 | Stop reason: HOSPADM

## 2023-09-11 RX ORDER — ACETAMINOPHEN 500 MG
1000 TABLET ORAL ONCE
Status: COMPLETED | OUTPATIENT
Start: 2023-09-11 | End: 2023-09-11

## 2023-09-11 RX ORDER — SODIUM CHLORIDE 0.9 % (FLUSH) 0.9 %
5-40 SYRINGE (ML) INJECTION EVERY 12 HOURS SCHEDULED
Status: DISCONTINUED | OUTPATIENT
Start: 2023-09-11 | End: 2023-09-11 | Stop reason: HOSPADM

## 2023-09-11 RX ORDER — EPHEDRINE SULFATE 50 MG/ML
INJECTION INTRAVENOUS PRN
Status: DISCONTINUED | OUTPATIENT
Start: 2023-09-11 | End: 2023-09-11 | Stop reason: SDUPTHER

## 2023-09-11 RX ORDER — FENTANYL CITRATE 50 UG/ML
100 INJECTION, SOLUTION INTRAMUSCULAR; INTRAVENOUS
Status: DISCONTINUED | OUTPATIENT
Start: 2023-09-11 | End: 2023-09-11 | Stop reason: HOSPADM

## 2023-09-11 RX ORDER — SODIUM CHLORIDE, SODIUM LACTATE, POTASSIUM CHLORIDE, CALCIUM CHLORIDE 600; 310; 30; 20 MG/100ML; MG/100ML; MG/100ML; MG/100ML
INJECTION, SOLUTION INTRAVENOUS CONTINUOUS
Status: DISCONTINUED | OUTPATIENT
Start: 2023-09-11 | End: 2023-09-11 | Stop reason: HOSPADM

## 2023-09-11 RX ORDER — FENTANYL CITRATE 50 UG/ML
25 INJECTION, SOLUTION INTRAMUSCULAR; INTRAVENOUS EVERY 5 MIN PRN
Status: DISCONTINUED | OUTPATIENT
Start: 2023-09-11 | End: 2023-09-11 | Stop reason: HOSPADM

## 2023-09-11 RX ORDER — FENTANYL CITRATE 50 UG/ML
INJECTION, SOLUTION INTRAMUSCULAR; INTRAVENOUS PRN
Status: DISCONTINUED | OUTPATIENT
Start: 2023-09-11 | End: 2023-09-11 | Stop reason: SDUPTHER

## 2023-09-11 RX ORDER — KETOROLAC TROMETHAMINE 30 MG/ML
INJECTION, SOLUTION INTRAMUSCULAR; INTRAVENOUS PRN
Status: DISCONTINUED | OUTPATIENT
Start: 2023-09-11 | End: 2023-09-11 | Stop reason: SDUPTHER

## 2023-09-11 RX ADMIN — FENTANYL CITRATE 25 MCG: 50 INJECTION, SOLUTION INTRAMUSCULAR; INTRAVENOUS at 08:03

## 2023-09-11 RX ADMIN — MIDAZOLAM 2 MG: 1 INJECTION INTRAMUSCULAR; INTRAVENOUS at 07:30

## 2023-09-11 RX ADMIN — ONDANSETRON HYDROCHLORIDE 4 MG: 2 INJECTION, SOLUTION INTRAMUSCULAR; INTRAVENOUS at 08:02

## 2023-09-11 RX ADMIN — ACETAMINOPHEN 1000 MG: 500 TABLET ORAL at 06:36

## 2023-09-11 RX ADMIN — PROPOFOL 75 MCG/KG/MIN: 10 INJECTION, EMULSION INTRAVENOUS at 07:35

## 2023-09-11 RX ADMIN — PROPOFOL 50 MG: 10 INJECTION, EMULSION INTRAVENOUS at 07:35

## 2023-09-11 RX ADMIN — EPHEDRINE SULFATE 10 MG: 50 INJECTION INTRAVENOUS at 07:49

## 2023-09-11 RX ADMIN — ONDANSETRON 4 MG: 2 INJECTION INTRAMUSCULAR; INTRAVENOUS at 09:00

## 2023-09-11 RX ADMIN — SODIUM CHLORIDE, POTASSIUM CHLORIDE, SODIUM LACTATE AND CALCIUM CHLORIDE: 600; 310; 30; 20 INJECTION, SOLUTION INTRAVENOUS at 08:16

## 2023-09-11 RX ADMIN — KETOROLAC TROMETHAMINE 30 MG: 30 INJECTION, SOLUTION INTRAMUSCULAR at 08:20

## 2023-09-11 RX ADMIN — SODIUM CHLORIDE, POTASSIUM CHLORIDE, SODIUM LACTATE AND CALCIUM CHLORIDE: 600; 310; 30; 20 INJECTION, SOLUTION INTRAVENOUS at 06:34

## 2023-09-11 RX ADMIN — Medication 80 MCG: at 07:43

## 2023-09-11 RX ADMIN — FENTANYL CITRATE 50 MCG: 50 INJECTION, SOLUTION INTRAMUSCULAR; INTRAVENOUS at 07:35

## 2023-09-11 RX ADMIN — LIDOCAINE HYDROCHLORIDE 80 MG: 20 INJECTION, SOLUTION EPIDURAL; INFILTRATION; INTRACAUDAL; PERINEURAL at 07:35

## 2023-09-11 ASSESSMENT — PAIN - FUNCTIONAL ASSESSMENT: PAIN_FUNCTIONAL_ASSESSMENT: 0-10

## 2023-09-11 ASSESSMENT — PAIN SCALES - GENERAL: PAINLEVEL_OUTOF10: 2

## 2023-09-11 NOTE — PERIOP NOTE
0434         Discharge instructions reviewed and signed with pt  and pt , opportunity for questions provided.

## 2023-09-11 NOTE — DISCHARGE INSTRUCTIONS
TORADOL GIVEN AT 8:20 AM,DO NOT TAKE ANY NSAIDS UNTIL AFTER 2:20PM TODAY. TYLENOL GIVEN AT 6:36 AM.    Activity: activity as tolerated and no driving for today and no sex for 1 week  Diet: regular diet  Wound Care: keep wound clean and dry and none needed      The office will call and check in on you next week. If any concerns, send a [employer] message or call the office at [phone]. Things went smoothly!         ______________________________________________________________________    Anesthesia Discharge Instructions    After general anesthesia or intervenous sedation, for 24 hours or while taking prescription Narcotics:  Limit your activities  Do not drive or operate hazardous machinery  If you have not urinated within 8 hours after discharge, please contact your surgeon on call. Do not make important personal or business decisions  Do not drink alcoholic beverages    Report the following to your surgeon:  Excessive pain, swelling, redness or odor of or around the surgical area  Temperature over 100.5 degrees  Nausea and vomiting lasting longer than 4 hours or if unable to take medication  Any signs of decreased circulation or nerve impairment to extremity:  Change in color, persistent numbness, tingling, coldness or increased pain.   Any questions

## 2023-09-11 NOTE — ANESTHESIA POSTPROCEDURE EVALUATION
Department of Anesthesiology  Postprocedure Note    Patient: Megan Gaines  MRN: [id number]  YOB: 1985  Date of evaluation: 9/11/2023      Procedure Summary     Date: 09/11/23 Room / Location: [facility] MAIN OR [address] [place] [address] MAIN OR    Anesthesia Start: 0730 Anesthesia Stop: 0825    Procedure: HYSTEROSCOPY DILATATION AND CURETTAGE, NOVASURE ABLATION (Uterus) Diagnosis:       Menorrhagia with regular cycle      (Menorrhagia with regular cycle [N92.0])    Providers: Bettyjane Blizzard, MD Responsible Provider: Tonia Pierce MD    Anesthesia Type: general ASA Status: 2          Anesthesia Type: No value filed.     Rachna Phase I: Rachna Score: 10    Rachna Phase II: Rachna Score: 10      Anesthesia Post Evaluation    Patient location during evaluation: PACU  Patient participation: complete - patient participated  Level of consciousness: awake  Airway patency: patent  Nausea & Vomiting: no nausea  Complications: no  Cardiovascular status: blood pressure returned to baseline and hemodynamically stable  Respiratory status: acceptable  Hydration status: stable  Multimodal analgesia pain management approach

## 2023-09-11 NOTE — OP NOTE
Procedure: Diagnostic hysteroscopy,  dilatation and curettage Novasure endometrial ablation    Preop Dx: Menorrhagia    Postop Dx: Same    Surgeon:  Ti Wise MD    Anesthesia:  MAC with paracervical block of 1%lidocaine w epi    EBL: < 50cc    Comp: none    Specimens:  Endometrial  curettings    Indication:  43yo with heavy vaginal bleeding, negative pipelle and US remarkable only for thickened endometrial lining    Findings:  normal uterine cavity; tubal ostia visualized bilaterally; thickened endometrial lining        After proper patient identification and consent were obtained, the patient was taken to the OR, where after sufficient  anesthesia, she was placed in the dorsal lithotomy position and prepped and draped in the usual fashion. Examination under anesthesia revealed an upper normal size uterus. A speculum was placed in the vagina and a paracervical block was placed at 8, 12 and 4o'clock. The ant lip of the cervix was grasped with an Allis tenaculum. The cervix was gently dilated and yhe hysteroscope was placed to the endocervix and using hydrodissection, the endometrial cavity was entered. The cavity was distended with NS. Both tubal ostia were identified. The cavity appeared as above. There were no significant polyps or fibroids identified. The hysteroscope was removed. An endometrial curettage was performed. A large amount of tissue was obtained and sent for pathology. Uterine cavity found to be 5.5cm and the endometrial width 4.0cm. The Novassure device was entered into the cavity without difficutly and the cavity assessed to be intact after which the device was deployed for approx 60 seconds. The Novassure was removed. At the end of the procedure hemostasis was noted. The pad, needle and instrument count were correct x 2. The patient was awakened from anesthesia and went to recovery in stable condition.

## 2023-09-11 NOTE — DISCHARGE SUMMARY
Gynecology Discharge Summary     Patient ID:  Vinay Frost  [id number]  36 y.o.  1985    Admit date: 9/11/2023    Discharge date: 9/11/2023     Admission Diagnoses:   Patient Active Problem List   Diagnosis    Venous thrombosis    Family history of breast cancer    Moderate episode of recurrent major depressive disorder ([address])    Attention deficit hyperactivity disorder (ADHD), predominantly inattentive type    PCOS (polycystic ovarian syndrome)    Anxiety    Prediabetes    Hypercholesterolemia    COVID-19    Multiple nevi    Fatigue, unspecified type    History of goiter    Acne vulgaris       Discharge Diagnoses: There are no discharge diagnoses documented for the most recent discharge. Patient Active Problem List   Diagnosis    Venous thrombosis    Family history of breast cancer    Moderate episode of recurrent major depressive disorder ([address])    Attention deficit hyperactivity disorder (ADHD), predominantly inattentive type    PCOS (polycystic ovarian syndrome)    Anxiety    Prediabetes    Hypercholesterolemia    COVID-19    Multiple nevi    Fatigue, unspecified type    History of goiter    Acne vulgaris       Procedures for this admission: Procedure(s): HYSTEROSCOPY DILATATION AND CURETTAGE, W180  [facility] Course:    Disposition: home    Discharged Condition: good            Patient Instructions:   Current Discharge Medication List        CONTINUE these medications which have NOT CHANGED    Details   Trifarotene (AKLIEF) 0.005 % CREA Apply topically as needed      Dapsone 5 % GEL Apply topically as needed      Multiple Vitamins-Minerals (MULTIVITAMIN ADULT EXTRA C PO) Take 1 tablet by mouth daily      buPROPion (WELLBUTRIN XL) 150 MG extended release tablet Take 1 tablet by mouth every morning      Methylphenidate HCl ER 18 MG TB24 Take 18 mg by mouth daily.       spironolactone (ALDACTONE) 50 MG tablet 2 tablets daily           Activity: activity as tolerated and no driving for today and no

## 2023-09-13 ENCOUNTER — HOSPITAL ENCOUNTER (OUTPATIENT)
Facility: HOSPITAL | Age: 38
Setting detail: RECURRING SERIES
Discharge: HOME | End: 2023-09-16
Payer: COMMERCIAL

## 2023-09-13 PROCEDURE — 97140 MANUAL THERAPY 1/> REGIONS: CPT | Performed by: PHYSICAL THERAPIST

## 2023-09-13 PROCEDURE — 97112 NEUROMUSCULAR REEDUCATION: CPT | Performed by: PHYSICAL THERAPIST

## 2023-09-13 NOTE — PROGRESS NOTES
PHYSICAL THERAPY - MEDICARE DAILY TREATMENT NOTE (updated 3/23)      Date: 2023          Patient Name:  Alvin Number :  1985   Medical   Diagnosis:  Left shoulder pain [M25.512]  Neck pain [M54.2] Treatment Diagnosis:  M25.512  LEFT SHOULDER PAIN    Referral Source:  Rosa Isela Hernandez MD Insurance:   Payor: [employer] / Plan: [employer] / Product Type: *No Product type* /                     Patient  verified yes     Visit #   Current  / Total 11 30   Time   In / Out 1035 AM 1115 AM   Total Treatment Time 40   Total Timed Codes 40   1:1 Treatment Time 40      [facility] Totals Reminder:  bill using total billable   min of TIMED therapeutic procedures and modalities. 8-22 min = 1 unit; 23-37 min = 2 units; 38-52 min = 3 units; 53-67 min = 4 units; 68-82 min = 5 units            SUBJECTIVE    Pain Level (0-10 scale): 0    Any medication changes, allergies to medications, adverse drug reactions, diagnosis change, or new procedure performed?: [x] No    [] Yes (see summary sheet for update)  Medications: Verified on Patient Summary List    Subjective functional status/changes:     Pt reports she did a workout with pushups yesterday and felt much more stable    OBJECTIVE      Therapeutic Procedures: Tx Min Billable or 1:1 Min (if diff from Tx Min) Procedure, Rationale, Specifics   10  08213 Manual Therapy (timed):  decrease pain, increase ROM, increase tissue extensibility, and decrease trigger points to improve patient's ability to progress to PLOF and address remaining functional goals. The manual therapy interventions were performed at a separate and distinct time from the therapeutic activities interventions .  (see flow sheet as applicable)     Details if applicable:  2 person Rib pumping, L pec release   30  77007 Neuromuscular Re-Education (timed):  improve balance, coordination, kinesthetic sense, posture, core stability and proprioception to improve patient's ability to develop

## 2023-09-20 ENCOUNTER — HOSPITAL ENCOUNTER (OUTPATIENT)
Facility: HOSPITAL | Age: 38
Setting detail: RECURRING SERIES
Discharge: HOME | End: 2023-09-23
Payer: COMMERCIAL

## 2023-09-20 PROCEDURE — 97112 NEUROMUSCULAR REEDUCATION: CPT | Performed by: PHYSICAL THERAPIST

## 2023-09-20 PROCEDURE — 97140 MANUAL THERAPY 1/> REGIONS: CPT | Performed by: PHYSICAL THERAPIST

## 2023-09-20 NOTE — PROGRESS NOTES
PHYSICAL THERAPY - MEDICARE DAILY TREATMENT NOTE (updated 3/23)      Date: 2023          Patient Name:  Lloyd Sanford :  1985   Medical   Diagnosis:  Left shoulder pain [M25.512]  Neck pain [M54.2] Treatment Diagnosis:  M25.512  LEFT SHOULDER PAIN    Referral Source:  Bret Mccormick MD Insurance:   Payor: Rachel Soni / Plan: [facility] [employer] / Product Type: *No Product type* /                     Patient  verified yes     Visit #   Current  / Total 12 30   Time   In / Out 1030 AM 1140 AM   Total Treatment Time 70   Total Timed Codes 55   1:1 Treatment Time 54      [facility] Totals Reminder:  bill using total billable   min of TIMED therapeutic procedures and modalities. 8-22 min = 1 unit; 23-37 min = 2 units; 38-52 min = 3 units; 53-67 min = 4 units; 68-82 min = 5 units            SUBJECTIVE    Pain Level (0-10 scale): 1    Any medication changes, allergies to medications, adverse drug reactions, diagnosis change, or new procedure performed?: [x] No    [] Yes (see summary sheet for update)  Medications: Verified on Patient Summary List    Subjective functional status/changes:     Pt reports she had some L shoulder pain after a kick boxing class   \"I have some R low back pulling\"    OBJECTIVE      Therapeutic Procedures: Tx Min Billable or 1:1 Min (if diff from Tx Min) Procedure, Rationale, Specifics   15  35343 Manual Therapy (timed):  decrease pain, increase ROM, increase tissue extensibility, and decrease trigger points to improve patient's ability to progress to PLOF and address remaining functional goals. The manual therapy interventions were performed at a separate and distinct time from the therapeutic activities interventions .  (see flow sheet as applicable)     Details if applicable:  2 person Rib pumping, L pec release   40  91187 Neuromuscular Re-Education (timed):  improve balance, coordination, kinesthetic sense, posture, core stability and proprioception to improve patient's

## 2023-09-27 ENCOUNTER — APPOINTMENT (OUTPATIENT)
Facility: HOSPITAL | Age: 38
End: 2023-09-27
Payer: COMMERCIAL

## 2023-09-28 ENCOUNTER — HOSPITAL ENCOUNTER (OUTPATIENT)
Facility: HOSPITAL | Age: 38
Setting detail: RECURRING SERIES
End: 2023-09-28
Payer: COMMERCIAL

## 2023-09-28 PROCEDURE — 97112 NEUROMUSCULAR REEDUCATION: CPT | Performed by: PHYSICAL THERAPIST

## 2023-09-28 NOTE — PROGRESS NOTES
PHYSICAL THERAPY - MEDICARE DAILY TREATMENT NOTE (updated 3/23)      Date: 2023          Patient Name:  Donald Mcdowell :  1985   Medical   Diagnosis:  Left shoulder pain [M25.512]  Neck pain [M54.2] Treatment Diagnosis:  M25.512  LEFT SHOULDER PAIN    Referral Source:  Goran Landry MD Insurance:   Payor: [facility] / Plan: [facility] [employer] / Product Type: *No Product type* /                     Patient  verified yes     Visit #   Current  / Total 12 30   Time   In / Out 230  PM   Total Treatment Time 55   Total Timed Codes 55   1:1 Treatment Time 54      [facility] Totals Reminder:  bill using total billable   min of TIMED therapeutic procedures and modalities. 8-22 min = 1 unit; 23-37 min = 2 units; 38-52 min = 3 units; 53-67 min = 4 units; 68-82 min = 5 units            SUBJECTIVE    Pain Level (0-10 scale): 1    Any medication changes, allergies to medications, adverse drug reactions, diagnosis change, or new procedure performed?: [x] No    [] Yes (see summary sheet for update)  Medications: Verified on Patient Summary List    Subjective functional status/changes:     Pt reports she had L hip pain this weekend \"it felt like a lot of things were off\"   Pt had mild sickness on Monday    OBJECTIVE      Therapeutic Procedures: Tx Min Billable or 1:1 Min (if diff from Tx Min) Procedure, Rationale, Specifics     87837 Manual Therapy (timed):  decrease pain, increase ROM, increase tissue extensibility, and decrease trigger points to improve patient's ability to progress to PLOF and address remaining functional goals. The manual therapy interventions were performed at a separate and distinct time from the therapeutic activities interventions .  (see flow sheet as applicable)     Details if applicable:  2 person Rib pumping, L pec release   55  57265 Neuromuscular Re-Education (timed):  improve balance, coordination, kinesthetic sense, posture, core stability and proprioception to improve

## 2023-10-05 ENCOUNTER — APPOINTMENT (OUTPATIENT)
Facility: HOSPITAL | Age: 38
End: 2023-10-05
Payer: COMMERCIAL

## 2023-10-06 ENCOUNTER — HOSPITAL ENCOUNTER (OUTPATIENT)
Facility: HOSPITAL | Age: 38
Setting detail: RECURRING SERIES
Discharge: HOME | End: 2023-10-09
Payer: COMMERCIAL

## 2023-10-06 PROCEDURE — 97112 NEUROMUSCULAR REEDUCATION: CPT | Performed by: PHYSICAL THERAPIST

## 2023-10-06 NOTE — PROGRESS NOTES
improve patient's ability to develop conscious control of individual muscles and awareness of position of extremities in order to progress to PLOF and address remaining functional goals. (see flow sheet as applicable)   45     Total Total   Modalities Rationale:     decrease inflammation and decrease pain to improve patient's ability to progress to PLOF and address remaining functional goals. min [] Estim Unattended,             type/location:       []  w/ice    []  w/heat        min [] Estim Attended,             type/location:       []  w/ice   []  w/heat         []  w/US   []  TENS insruct            min []  Mechanical Traction,        type/lbs:        []  pro      []  sup           []  int       []  cont            []  before manual           []  after manual     min []  Ultrasound,         settings/location:      min  unbilled [x]  Ice     []  Heat            location/position: seated, L shoulder    15 unbilled     min [x]  Vasopneumatic Device,      press/temp:34 mod   pre-treatment girth :    post-treatment girth :    measured at (landmark       location) :    If using vaso (only need to measure limb vaso being performed on)        min []  Other:        Skin assessment post-treatment (if applicable):    [x]  intact    []  redness- no adverse reaction                 []redness - adverse reaction:        Other Objective/Functional Measures  Shoulder flexion and IR Pain free and WFL  HAdDT +L (- L after interventions)   HAdLT L4 B  Hip and shoulder pain with s/l hip lift, less with encouragement to engage side abs    Pain Level at end of session (0-10 scale): 0      Assessment   Patient will continue to benefit from skilled PT / OT services to modify and progress therapeutic interventions, analyze and address functional mobility deficits, analyze and address ROM deficits, analyze and address strength deficits, analyze and address soft tissue restrictions, analyze and cue for proper movement patterns,

## 2023-10-11 ENCOUNTER — HOSPITAL ENCOUNTER (OUTPATIENT)
Facility: HOSPITAL | Age: 38
Setting detail: RECURRING SERIES
Discharge: HOME | End: 2023-10-14
Payer: COMMERCIAL

## 2023-10-11 PROCEDURE — 97112 NEUROMUSCULAR REEDUCATION: CPT | Performed by: PHYSICAL THERAPIST

## 2023-10-11 PROCEDURE — 97140 MANUAL THERAPY 1/> REGIONS: CPT | Performed by: PHYSICAL THERAPIST

## 2023-10-11 NOTE — PROGRESS NOTES
PHYSICAL THERAPY - MEDICARE DAILY TREATMENT NOTE (updated 3/23)      Date: 10/11/2023          Patient Name:  Diomedes Thacker :  1985   Medical   Diagnosis:  Left shoulder pain [M25.512]  Neck pain [M54.2] Treatment Diagnosis:  M25.512  LEFT SHOULDER PAIN    Referral Source:  Lorena Hendrickson MD Insurance:   Payor: Marixa Kathleen / Plan: [employer]aranya Mayank [facility] [employer] / Product Type: *No Product type* /                     Patient  verified yes     Visit #   Current  / Total 15 30   Time   In / Out 1030 AM 1130 AM   Total Treatment Time 60   Total Timed Codes 45   1:1 Treatment Time 39      [facility] Totals Reminder:  bill using total billable   min of TIMED therapeutic procedures and modalities. 8-22 min = 1 unit; 23-37 min = 2 units; 38-52 min = 3 units; 53-67 min = 4 units; 68-82 min = 5 units            SUBJECTIVE    Pain Level (0-10 scale): 1    Any medication changes, allergies to medications, adverse drug reactions, diagnosis change, or new procedure performed?: [x] No    [] Yes (see summary sheet for update)  Medications: Verified on Patient Summary List    Subjective functional status/changes:     Pt reports she had L hip pain this weekend \"it felt like a lot of things were off\"   Pt had mild sickness on Monday    OBJECTIVE      Therapeutic Procedures: Tx Min Billable or 1:1 Min (if diff from Tx Min) Procedure, Rationale, Specifics   10  17593 Manual Therapy (timed):  decrease pain, increase ROM, increase tissue extensibility, and decrease trigger points to improve patient's ability to progress to PLOF and address remaining functional goals. The manual therapy interventions were performed at a separate and distinct time from the therapeutic activities interventions .  (see flow sheet as applicable)     Details if applicable:  2 person Rib pumping, L pec release   35  15204 Neuromuscular Re-Education (timed):  improve balance, coordination, kinesthetic sense, posture, core stability and proprioception to

## 2023-10-16 ENCOUNTER — APPOINTMENT (OUTPATIENT)
Facility: HOSPITAL | Age: 38
End: 2023-10-16
Payer: COMMERCIAL

## 2023-10-25 ENCOUNTER — HOSPITAL ENCOUNTER (OUTPATIENT)
Facility: HOSPITAL | Age: 38
Setting detail: RECURRING SERIES
Discharge: HOME | End: 2023-10-28
Payer: COMMERCIAL

## 2023-10-25 PROCEDURE — 97112 NEUROMUSCULAR REEDUCATION: CPT | Performed by: PHYSICAL THERAPIST

## 2023-10-25 PROCEDURE — 97110 THERAPEUTIC EXERCISES: CPT | Performed by: PHYSICAL THERAPIST

## 2023-10-25 NOTE — PROGRESS NOTES
PHYSICAL THERAPY - MEDICARE DAILY TREATMENT NOTE (updated 3/23)      Date: 10/25/2023          Patient Name:  Judy Rosa :  1985   Medical   Diagnosis:  Left shoulder pain [M25.512]  Neck pain [M54.2] Treatment Diagnosis:  M25.512  LEFT SHOULDER PAIN    Referral Source:  Carlos Sauceda MD Insurance:   Payor: Marta Mis / Plan: Michelle Noriega [address] [place] / Product Type: *No Product type* /                     Patient  verified yes     Visit #   Current  / Total 16 30   Time   In / Out 1030 AM 1115 AM   Total Treatment Time 45   Total Timed Codes 45   1:1 Treatment Time 39      [facility] Totals Reminder:  bill using total billable   min of TIMED therapeutic procedures and modalities. 8-22 min = 1 unit; 23-37 min = 2 units; 38-52 min = 3 units; 53-67 min = 4 units; 68-82 min = 5 units            SUBJECTIVE    Pain Level (0-10 scale): 0    Any medication changes, allergies to medications, adverse drug reactions, diagnosis change, or new procedure performed?: [x] No    [] Yes (see summary sheet for update)  Medications: Verified on Patient Summary List    Subjective functional status/changes:     Pt reports she no longer has dizziness. \"I had an increase in L UT pain Thursday after exercising. \"    OBJECTIVE      Therapeutic Procedures: Tx Min Billable or 1:1 Min (if diff from Tx Min) Procedure, Rationale, Specifics   0  33342 Manual Therapy (timed):  decrease pain, increase ROM, increase tissue extensibility, and decrease trigger points to improve patient's ability to progress to PLOF and address remaining functional goals. The manual therapy interventions were performed at a separate and distinct time from the therapeutic activities interventions .  (see flow sheet as applicable)     Details if applicable:  2 person Rib pumping, L pec release   45  88873 Neuromuscular Re-Education (timed):  improve balance, coordination, kinesthetic sense, posture, core stability and proprioception to improve patient's

## 2023-10-30 ENCOUNTER — APPOINTMENT (OUTPATIENT)
Facility: HOSPITAL | Age: 38
End: 2023-10-30
Payer: COMMERCIAL

## 2023-11-01 ENCOUNTER — HOSPITAL ENCOUNTER (OUTPATIENT)
Facility: HOSPITAL | Age: 38
Discharge: HOME | End: 2023-11-04
Attending: OBSTETRICS & GYNECOLOGY
Payer: COMMERCIAL

## 2023-11-01 VITALS — WEIGHT: 149 LBS | BODY MASS INDEX: 23.39 KG/M2 | HEIGHT: 67 IN

## 2023-11-01 DIAGNOSIS — Z12.31 VISIT FOR SCREENING MAMMOGRAM: ICD-10-CM

## 2023-11-01 PROCEDURE — 77063 BREAST TOMOSYNTHESIS BI: CPT

## 2023-11-08 ENCOUNTER — APPOINTMENT (OUTPATIENT)
Facility: HOSPITAL | Age: 38
End: 2023-11-08
Payer: COMMERCIAL

## 2023-11-20 ENCOUNTER — HOSPITAL ENCOUNTER (OUTPATIENT)
Facility: HOSPITAL | Age: 38
Setting detail: RECURRING SERIES
Discharge: HOME | End: 2023-11-23
Payer: COMMERCIAL

## 2023-11-20 PROCEDURE — 97112 NEUROMUSCULAR REEDUCATION: CPT | Performed by: PHYSICAL THERAPIST

## 2023-11-20 NOTE — PROGRESS NOTES
PHYSICAL THERAPY - MEDICARE DAILY TREATMENT NOTE (updated 3/23)      Date: 2023          Patient Name:  Nikos Lara :  1985   Medical   Diagnosis:  Left shoulder pain [M25.512]  Neck pain [M54.2] Treatment Diagnosis:  M25.512  LEFT SHOULDER PAIN    Referral Source:  Leydi Stoddard MD Insurance:   Payor: Kranthi Perryty / Plan: [employer] / Product Type: *No Product type* /                     Patient  verified yes     Visit #   Current  / Total 17 30   Time   In / Out 1040 AM 1135 AM   Total Treatment Time 55   Total Timed Codes 55   1:1 Treatment Time 54      [facility] Totals Reminder:  bill using total billable   min of TIMED therapeutic procedures and modalities. 8-22 min = 1 unit; 23-37 min = 2 units; 38-52 min = 3 units; 53-67 min = 4 units; 68-82 min = 5 units            SUBJECTIVE    Pain Level (0-10 scale): 0    Any medication changes, allergies to medications, adverse drug reactions, diagnosis change, or new procedure performed?: [x] No    [] Yes (see summary sheet for update)  Medications: Verified on Patient Summary List    Subjective functional status/changes:     Pt reports she has been feeling good and hasn't had any pain with her exercise classes    OBJECTIVE      Therapeutic Procedures: Tx Min Billable or 1:1 Min (if diff from Tx Min) Procedure, Rationale, Specifics   0  23952 Manual Therapy (timed):  decrease pain, increase ROM, increase tissue extensibility, and decrease trigger points to improve patient's ability to progress to PLOF and address remaining functional goals. The manual therapy interventions were performed at a separate and distinct time from the therapeutic activities interventions .  (see flow sheet as applicable)     Details if applicable:  2 person Rib pumping, L pec release   55  25499 Neuromuscular Re-Education (timed):  improve balance, coordination, kinesthetic sense, posture, core stability and proprioception to improve patient's ability to

## 2023-11-28 ENCOUNTER — PATIENT MESSAGE (OUTPATIENT)
Age: 38
End: 2023-11-28

## 2023-11-28 DIAGNOSIS — F90.0 ATTENTION-DEFICIT HYPERACTIVITY DISORDER, PREDOMINANTLY INATTENTIVE TYPE: Primary | ICD-10-CM

## 2023-11-28 RX ORDER — METHYLPHENIDATE HYDROCHLORIDE 18 MG/1
18 TABLET, EXTENDED RELEASE ORAL DAILY
Qty: 30 TABLET | Refills: 0 | Status: SHIPPED | OUTPATIENT
Start: 2023-11-28 | End: 2023-12-28

## 2023-11-28 NOTE — TELEPHONE ENCOUNTER
From: Deborah James  To: Dr. Pinto Delay  Sent: 11/28/2023 11:19 AM EST  Subject: Prescription refill please    Hi Dr. Jenny Juarez! I need a refill of Methylphenidate HCl ER 18 MG TB24 please.

## 2023-11-29 ENCOUNTER — APPOINTMENT (OUTPATIENT)
Facility: HOSPITAL | Age: 38
End: 2023-11-29
Payer: COMMERCIAL

## 2023-12-06 ENCOUNTER — APPOINTMENT (OUTPATIENT)
Facility: HOSPITAL | Age: 38
End: 2023-12-06
Payer: COMMERCIAL

## 2024-01-17 ENCOUNTER — HOSPITAL ENCOUNTER (OUTPATIENT)
Facility: HOSPITAL | Age: 39
Setting detail: RECURRING SERIES
Discharge: HOME | End: 2024-01-20
Payer: COMMERCIAL

## 2024-01-17 PROCEDURE — 97112 NEUROMUSCULAR REEDUCATION: CPT | Performed by: PHYSICAL THERAPIST

## 2024-01-17 NOTE — PROGRESS NOTES
PHYSICAL THERAPY - MEDICARE DAILY TREATMENT NOTE (updated 3/23)      Date: 2024          Patient Name:  Tete Lopez :  1985   Medical   Diagnosis:  Left shoulder pain [M25.512]  Neck pain [M54.2] Treatment Diagnosis:  M25.512  LEFT SHOULDER PAIN    Referral Source:  Elia Faith III, MD Insurance:   Payor: /                     Patient  verified yes     Visit #   Current  / Total 19 30   Time   In / Out 1140 AM 1205 PM   Total Treatment Time 25   Total Timed Codes 25   1:1 Treatment Time 25      [facility] Totals Reminder:  bill using total billable   min of TIMED therapeutic procedures and modalities.   8-22 min = 1 unit; 23-37 min = 2 units; 38-52 min = 3 units; 53-67 min = 4 units; 68-82 min = 5 units            SUBJECTIVE    Pain Level (0-10 scale): 0    Any medication changes, allergies to medications, adverse drug reactions, diagnosis change, or new procedure performed?: [x] No    [] Yes (see summary sheet for update)  Medications: Verified on Patient Summary List    Subjective functional status/changes:     Pt reports she had pain in her L deltoid after barre last week  \"I had one episode of dizziness but I think I was holding my breath\"    OBJECTIVE      Therapeutic Procedures:  Tx Min Billable or 1:1 Min (if diff from Tx Min) Procedure, Rationale, Specifics   0  03854 Manual Therapy (timed):  decrease pain, increase ROM, increase tissue extensibility, and decrease trigger points to improve patient's ability to progress to PLOF and address remaining functional goals.  The manual therapy interventions were performed at a separate and distinct time from the therapeutic activities interventions . (see flow sheet as applicable)     Details if applicable:  L AIC, R apcial expansion   25  50120 Neuromuscular Re-Education (timed):  improve balance, coordination, kinesthetic sense, posture, core stability and proprioception to improve patient's ability to develop conscious control of individual

## 2024-01-31 ENCOUNTER — HOSPITAL ENCOUNTER (OUTPATIENT)
Facility: HOSPITAL | Age: 39
Setting detail: RECURRING SERIES
Discharge: HOME | End: 2024-02-03
Payer: COMMERCIAL

## 2024-01-31 PROCEDURE — 97110 THERAPEUTIC EXERCISES: CPT | Performed by: PHYSICAL THERAPIST

## 2024-01-31 PROCEDURE — 97112 NEUROMUSCULAR REEDUCATION: CPT | Performed by: PHYSICAL THERAPIST

## 2024-01-31 NOTE — PROGRESS NOTES
PHYSICAL THERAPY - MEDICARE DAILY TREATMENT NOTE (updated 3/23)      Date: 2024          Patient Name:  Tete Lopez :  1985   Medical   Diagnosis:  Left shoulder pain [M25.512]  Neck pain [M54.2] Treatment Diagnosis:  M25.512  LEFT SHOULDER PAIN    Referral Source:  Elia Faith III, MD Insurance:   Payor: [facility] / Plan: [facility] [employer]YE[employer] / Product Type: *No Product type* /                     Patient  verified yes     Visit #   Current  / Total 19 30   Time   In / Out 1035 AM 1135 PM   Total Treatment Time 60   Total Timed Codes 60   1:1 Treatment Time 55      [facility] Totals Reminder:  bill using total billable   min of TIMED therapeutic procedures and modalities.   8-22 min = 1 unit; 23-37 min = 2 units; 38-52 min = 3 units; 53-67 min = 4 units; 68-82 min = 5 units            SUBJECTIVE    Pain Level (0-10 scale): 0    Any medication changes, allergies to medications, adverse drug reactions, diagnosis change, or new procedure performed?: [x] No    [] Yes (see summary sheet for update)  Medications: Verified on Patient Summary List    Subjective functional status/changes:     Pt reports she had some trouble with jumping jacks and with chest press  Pain did not last past her exercise class    OBJECTIVE      Therapeutic Procedures:  Tx Min Billable or 1:1 Min (if diff from Tx Min) Procedure, Rationale, Specifics   0  52802 Manual Therapy (timed):  decrease pain, increase ROM, increase tissue extensibility, and decrease trigger points to improve patient's ability to progress to PLOF and address remaining functional goals.  The manual therapy interventions were performed at a separate and distinct time from the therapeutic activities interventions . (see flow sheet as applicable)     Details if applicable:  L AIC, R apcial expansion   60 [phone] [id number] Neuromuscular Re-Education (timed):  improve balance, coordination, kinesthetic sense, posture, core stability and proprioception to improve patient's ability

## 2024-02-21 ENCOUNTER — HOSPITAL ENCOUNTER (OUTPATIENT)
Facility: HOSPITAL | Age: 39
Setting detail: RECURRING SERIES
Discharge: HOME | End: 2024-02-24
Payer: COMMERCIAL

## 2024-02-21 PROCEDURE — 97112 NEUROMUSCULAR REEDUCATION: CPT | Performed by: PHYSICAL THERAPIST

## 2024-02-21 NOTE — PROGRESS NOTES
PHYSICAL THERAPY - MEDICARE DAILY TREATMENT NOTE (updated 3/23)      Date: 2024          Patient Name:  Tete Lopez :  1985   Medical   Diagnosis:  Left shoulder pain [M25.512]  Neck pain [M54.2] Treatment Diagnosis:  M25.512  LEFT SHOULDER PAIN    Referral Source:  Elia Faith III, MD Insurance:   Payor: [facility] / Plan: [facility] [employer] / Product Type: *No Product type* /                     Patient  verified yes     Visit #   Current  / Total 21 30   Time   In / Out 1030 AM 1120 PM   Total Treatment Time 50   Total Timed Codes 45   1:1 Treatment Time 45      [facility] Totals Reminder:  bill using total billable   min of TIMED therapeutic procedures and modalities.   8-22 min = 1 unit; 23-37 min = 2 units; 38-52 min = 3 units; 53-67 min = 4 units; 68-82 min = 5 units            SUBJECTIVE    Pain Level (0-10 scale): 0    Any medication changes, allergies to medications, adverse drug reactions, diagnosis change, or new procedure performed?: [x] No    [] Yes (see summary sheet for update)  Medications: Verified on Patient Summary List    Subjective functional status/changes:     Pt reports she has been feeling good and able to tolerance her workout classes without an increase in pain    OBJECTIVE      Therapeutic Procedures:  Tx Min Billable or 1:1 Min (if diff from Tx Min) Procedure, Rationale, Specifics     59694 Therapeutic Exercise (timed):  increase ROM, strength, coordination, balance, and proprioception to improve patient's ability to progress to PLOF and address remaining functional goals. (see flow sheet as applicable)     Details if applicable:  AIDAN WILCOX, R apcial expansion   [phone] [id number] Neuromuscular Re-Education (timed):  improve balance, coordination, kinesthetic sense, posture, core stability and proprioception to improve patient's ability to develop conscious control of individual muscles and awareness of position of extremities in order to progress to PLOF and address remaining functional

## 2024-03-06 ENCOUNTER — HOSPITAL ENCOUNTER (OUTPATIENT)
Facility: HOSPITAL | Age: 39
Setting detail: RECURRING SERIES
Discharge: HOME | End: 2024-03-09
Payer: COMMERCIAL

## 2024-03-06 DIAGNOSIS — F90.0 ATTENTION-DEFICIT HYPERACTIVITY DISORDER, PREDOMINANTLY INATTENTIVE TYPE: ICD-10-CM

## 2024-03-06 PROCEDURE — 97535 SELF CARE MNGMENT TRAINING: CPT | Performed by: PHYSICAL THERAPIST

## 2024-03-06 PROCEDURE — 97112 NEUROMUSCULAR REEDUCATION: CPT | Performed by: PHYSICAL THERAPIST

## 2024-03-06 RX ORDER — METHYLPHENIDATE HYDROCHLORIDE 18 MG/1
18 TABLET, EXTENDED RELEASE ORAL DAILY
Qty: 30 TABLET | Refills: 0 | Status: SHIPPED | OUTPATIENT
Start: 2024-03-06 | End: 2024-04-05

## 2024-03-06 NOTE — PROGRESS NOTES
PHYSICAL THERAPY - MEDICARE DAILY TREATMENT NOTE (updated 3/23)      Date: 3/6/2024          Patient Name:  Tete Lopez :  1985   Medical   Diagnosis:  Left shoulder pain [M25.512]  Neck pain [M54.2] Treatment Diagnosis:  M25.512  LEFT SHOULDER PAIN    Referral Source:  Elia Faith III, MD Insurance:   Payor: [facility] / Plan: [employer] EM[employer]ES / Product Type: *No Product type* /                     Patient  verified yes     Visit #   Current  / Total 22 30   Time   In / Out 1035 AM 1135 PM   Total Treatment Time 60   Total Timed Codes 60   1:1 Treatment Time 60      [facility] Totals Reminder:  bill using total billable   min of TIMED therapeutic procedures and modalities.   8-22 min = 1 unit; 23-37 min = 2 units; 38-52 min = 3 units; 53-67 min = 4 units; 68-82 min = 5 units            SUBJECTIVE    Pain Level (0-10 scale): 0    Any medication changes, allergies to medications, adverse drug reactions, diagnosis change, or new procedure performed?: [x] No    [] Yes (see summary sheet for update)  Medications: Verified on Patient Summary List    Subjective functional status/changes:     Pt reports she is feeling better with yoga    OBJECTIVE      Therapeutic Procedures:  Tx Min Billable or 1:1 Min (if diff from Tx Min) Procedure, Rationale, Specifics   15  53253 Self Care/Home Management (timed):  improve patient knowledge and understanding of pain reducing techniques, positioning, diagnosis/prognosis, and physical therapy expectations, procedures and progression  to improve patient's ability to progress to PLOF and address remaining functional goals.  (see flow sheet as applicable)     Details if applicable:  shoes   45  25821 Neuromuscular Re-Education (timed):  improve balance, coordination, kinesthetic sense, posture, core stability and proprioception to improve patient's ability to develop conscious control of individual muscles and awareness of position of extremities in order to progress to PLOF and

## 2024-03-20 ENCOUNTER — APPOINTMENT (OUTPATIENT)
Facility: HOSPITAL | Age: 39
End: 2024-03-20
Payer: COMMERCIAL

## 2024-03-28 ENCOUNTER — APPOINTMENT (OUTPATIENT)
Facility: HOSPITAL | Age: 39
End: 2024-03-28
Payer: COMMERCIAL

## 2024-04-02 ENCOUNTER — TELEPHONE (OUTPATIENT)
Age: 39
End: 2024-04-02

## 2024-04-02 NOTE — TELEPHONE ENCOUNTER
Per Provider request, Provider would like the patient to be scheduled to a sooner day appointment or a day before 4/17 due to provider being off in 4/18. Called Cell phone and was sent to voicemail. Left voicemail to call back to schedule new appointment.

## 2024-04-18 ENCOUNTER — OFFICE VISIT (OUTPATIENT)
Age: 39
End: 2024-04-18
Payer: COMMERCIAL

## 2024-04-18 VITALS
WEIGHT: 146.4 LBS | OXYGEN SATURATION: 95 % | RESPIRATION RATE: 18 BRPM | DIASTOLIC BLOOD PRESSURE: 72 MMHG | TEMPERATURE: 98 F | HEIGHT: 67 IN | HEART RATE: 91 BPM | BODY MASS INDEX: 22.98 KG/M2 | SYSTOLIC BLOOD PRESSURE: 102 MMHG

## 2024-04-18 DIAGNOSIS — Z00.00 ENCOUNTER FOR WELL ADULT EXAM WITHOUT ABNORMAL FINDINGS: Primary | ICD-10-CM

## 2024-04-18 DIAGNOSIS — F33.1 MAJOR DEPRESSIVE DISORDER, RECURRENT, MODERATE (HCC): ICD-10-CM

## 2024-04-18 DIAGNOSIS — F90.0 ATTENTION-DEFICIT HYPERACTIVITY DISORDER, PREDOMINANTLY INATTENTIVE TYPE: ICD-10-CM

## 2024-04-18 PROCEDURE — 99395 PREV VISIT EST AGE 18-39: CPT | Performed by: INTERNAL MEDICINE

## 2024-04-18 RX ORDER — ISOTRETINOIN 30 MG/1
CAPSULE, GELATIN COATED ORAL
COMMUNITY
Start: 2023-12-15

## 2024-04-18 ASSESSMENT — ENCOUNTER SYMPTOMS
NAUSEA: 0
SINUS PRESSURE: 0
COUGH: 0
SHORTNESS OF BREATH: 0
BLOOD IN STOOL: 0
SORE THROAT: 0
CONSTIPATION: 0
EYE PAIN: 0
BACK PAIN: 0
SINUS PAIN: 0
VOMITING: 0
DIARRHEA: 0
ABDOMINAL PAIN: 0

## 2024-04-18 ASSESSMENT — PATIENT HEALTH QUESTIONNAIRE - PHQ9
10. IF YOU CHECKED OFF ANY PROBLEMS, HOW DIFFICULT HAVE THESE PROBLEMS MADE IT FOR YOU TO DO YOUR WORK, TAKE CARE OF THINGS AT HOME, OR GET ALONG WITH OTHER PEOPLE: SOMEWHAT DIFFICULT
6. FEELING BAD ABOUT YOURSELF - OR THAT YOU ARE A FAILURE OR HAVE LET YOURSELF OR YOUR FAMILY DOWN: SEVERAL DAYS
3. TROUBLE FALLING OR STAYING ASLEEP: SEVERAL DAYS
5. POOR APPETITE OR OVEREATING: NOT AT ALL
2. FEELING DOWN, DEPRESSED OR HOPELESS: NOT AT ALL
SUM OF ALL RESPONSES TO PHQ QUESTIONS 1-9: 3
8. MOVING OR SPEAKING SO SLOWLY THAT OTHER PEOPLE COULD HAVE NOTICED. OR THE OPPOSITE, BEING SO FIGETY OR RESTLESS THAT YOU HAVE BEEN MOVING AROUND A LOT MORE THAN USUAL: NOT AT ALL
SUM OF ALL RESPONSES TO PHQ9 QUESTIONS 1 & 2: 0
7. TROUBLE CONCENTRATING ON THINGS, SUCH AS READING THE NEWSPAPER OR WATCHING TELEVISION: SEVERAL DAYS
4. FEELING TIRED OR HAVING LITTLE ENERGY: NOT AT ALL
SUM OF ALL RESPONSES TO PHQ QUESTIONS 1-9: 3
SUM OF ALL RESPONSES TO PHQ QUESTIONS 1-9: 3
9. THOUGHTS THAT YOU WOULD BE BETTER OFF DEAD, OR OF HURTING YOURSELF: NOT AT ALL
SUM OF ALL RESPONSES TO PHQ QUESTIONS 1-9: 3
1. LITTLE INTEREST OR PLEASURE IN DOING THINGS: NOT AT ALL

## 2024-04-18 NOTE — PROGRESS NOTES
I have reviewed all needed documentation in preparation for visit. Verified patient by name and date of birth    Chief Complaint   Patient presents with    Annual Exam       \"Have you been to the ER, urgent care clinic since your last visit?  Hospitalized since your last visit?\"    NO    “Have you seen or consulted any other health care providers outside of [facility] since your last visit?”    YES - When: approximately 1  weeks ago.  Where and Why: Dermatologist, [facility] goldie Argueta.     “Have you had a pap smear?”    YES - Where: Dr Irving Craig  2023     Date of last Cervical Cancer screen (HPV or PAP): 1/21/2021             Click Here for Release of Records Request    Vitals:    04/18/24 1008   BP: 102/72   Site: Left Upper Arm   Position: Sitting   Cuff Size: Medium Adult   Pulse: 91   Resp: 18   Temp: 98 °F (36.7 °C)   TempSrc: Temporal   SpO2: 95%   Weight: 66.4 kg (146 lb 6.4 oz)   Height: 1.702 m (5' 7\")       Health Maintenance Due   Topic Date Due    Hepatitis B vaccine (1 of 3 - 3-dose series) Never done    Varicella vaccine (1 of 2 - 2-dose childhood series) Never done    COVID-19 Vaccine (5 - 2023-24 season) 09/01/2023    Cervical cancer screen  01/21/2024       Delilah Foster LPN   Physical Therapy Treatment Note    Date: 10/3/2022  Patient Name: Alyx Bullock  : 1964   MRN: [id number]  DOInjury: --  DOSx: 2022  Referring Provider: Dillon Sweet PA-C  [address] [place] [address]. [facility],  [address] [place] [address]     Medical Diagnosis:      Diagnosis Orders   1. Spondylolisthesis at L3-L4 level        2. S/P lumbar fusion              Lumbar fusion L3-5    Satnam is 3 months post L3-4 fusion. She is doing well. She does have trunk weakness typical with this phase of recovery. Treatment will be lumbar strengthening program with long-term HEP. Access Code: [email or web address]  URL: [email or web address]  Date: 2022  Prepared by: Carlos Finley    Exercises  Standing High Row with Resistance - 3-4 x weekly - 2 sets - 20 reps  Mid Row with anchored resistance - 3-4 x weekly - 2 sets - 20 reps  Standing Floor Level Row with Resistance Band with PLB - 3-4 x weekly - 2 sets - 20 reps  Standing march with counter support option - 3-4 x weekly - 2 sets - 20 reps  Alternating Hip Abduction with counter support - 3-4 x weekly - 2 sets - 20 reps  Mini Squat with Counter Support - 3-4 x weekly - 3 sets - 10 reps    X = TO BE PERFORMED NEXT VISIT  > = PROGRESS TO THIS    S: Pt reports no new complaints. Some soreness but she expects it. O:   Time 2776-2891     Visit 3/8 Repeat outcome measure at mid point and end. Pain Pain 1/10     ROM      Modalities Use sparingly if at all. Prefer an active program.     MH + ES   MO      MO         Manual         MT   ROM         NR      TE      TE      TE      TE         Exercise            ROWS: H Blue 2 x 20  TE   ROWS: M  Reach and Pull Blue 2 x 20  TE   ROWS: L   Reach and Pull Blue 2 x 20  TE   Tubing Pushes Blue 2 x 10  TE   Marching 2 x 20  TA   Alt hip Abd 2 x 20     Squats 2 x 10  TE      TE   Ambulation  8 x 150ft  TE                     A: Tolerated well.   Performed exercises above with good outcome  P: Continue with rehab plan  Carlos Finley PTA    Treatment Charges: Mins Units   Initial Evaluation     Re-Evaluation     Ther Exercise         TE 25 2   Manual Therapy     MT     Ther Activities        TA 15 1   Gait Training          GT     Neuro Re-education NR     Modalities     Non-Billable Service Time     Other     Total Time/Units 40 3

## 2024-04-18 NOTE — PROGRESS NOTES
Well Adult Note  Name: Tete Lopez Today’s Date: 2024   MRN: [id number] Sex: Female   Age: 38 y.o. Ethnicity: Non- / Non    : 1985 Race: White (non-)      Tete Lopez is here for well adult exam.  History:  No acute concerns. She wants to hold methylphenidate for now, didn't feel significant improvement of symptoms with methylphenidate, she has side effect of insomnia with the medication.  She wants to get breast MRI because of her mother history of breast cancer at age of 45, above average risk based on alberta model, 19.3%, no suspicion on recent Mammogram.     Review of Systems   Constitutional:  Negative for fever and unexpected weight change.   HENT:  Negative for congestion, sinus pressure, sinus pain and sore throat.    Eyes:  Negative for pain and visual disturbance.   Respiratory:  Negative for cough and shortness of breath.    Cardiovascular:  Negative for chest pain, palpitations and leg swelling.   Gastrointestinal:  Negative for abdominal pain, blood in stool, constipation, diarrhea, nausea and vomiting.   Endocrine: Negative for polydipsia.   Genitourinary:  Negative for difficulty urinating, dysuria, frequency, hematuria and urgency.   Musculoskeletal:  Negative for back pain, gait problem and neck pain.   Skin:  Negative for rash.   Allergic/Immunologic: Negative for immunocompromised state.   Neurological:  Negative for dizziness, seizures, syncope and headaches.   Hematological:  Negative for adenopathy.   Psychiatric/Behavioral:  Negative for behavioral problems, dysphoric mood, sleep disturbance and suicidal ideas. The patient is not nervous/anxious.        Allergies   Allergen Reactions    Sertraline Rash         Prior to Visit Medications    Medication Sig Taking? Authorizing Provider   ACCUTANE 30 MG chemo capsule  Yes Miguel Mcdaniel MD   Multiple Vitamins-Minerals (MULTIVITAMIN ADULT EXTRA C PO) Take 1 tablet by mouth daily Yes Miguel Mcdaniel MD

## 2024-06-26 DIAGNOSIS — Z00.00 ENCOUNTER FOR WELL ADULT EXAM WITHOUT ABNORMAL FINDINGS: ICD-10-CM

## 2024-06-26 DIAGNOSIS — F33.1 MAJOR DEPRESSIVE DISORDER, RECURRENT, MODERATE (HCC): ICD-10-CM

## 2024-06-26 DIAGNOSIS — F90.0 ATTENTION-DEFICIT HYPERACTIVITY DISORDER, PREDOMINANTLY INATTENTIVE TYPE: ICD-10-CM

## 2024-06-26 LAB
25(OH)D3 SERPL-MCNC: 37.1 NG/ML (ref 30–100)
ALBUMIN SERPL-MCNC: 3.7 G/DL (ref 3.5–5)
ALBUMIN/GLOB SERPL: 1.2 (ref 1.1–2.2)
ALP SERPL-CCNC: 58 U/L (ref 45–117)
ALT SERPL-CCNC: 26 U/L (ref 12–78)
ANION GAP SERPL CALC-SCNC: 1 MMOL/L (ref 5–15)
AST SERPL-CCNC: 16 U/L (ref 15–37)
BASOPHILS # BLD: 0.1 K/UL (ref 0–0.1)
BASOPHILS NFR BLD: 2 % (ref 0–1)
BILIRUB SERPL-MCNC: 0.8 MG/DL (ref 0.2–1)
BUN SERPL-MCNC: 16 MG/DL (ref 6–20)
BUN/CREAT SERPL: 19 (ref 12–20)
CALCIUM SERPL-MCNC: 9.4 MG/DL (ref 8.5–10.1)
CHLORIDE SERPL-SCNC: 108 MMOL/L (ref 97–108)
CHOLEST SERPL-MCNC: 160 MG/DL
CO2 SERPL-SCNC: 30 MMOL/L (ref 21–32)
CREAT SERPL-MCNC: 0.83 MG/DL (ref 0.55–1.02)
DIFFERENTIAL METHOD BLD: ABNORMAL
EOSINOPHIL # BLD: 0.4 K/UL (ref 0–0.4)
EOSINOPHIL NFR BLD: 9 % (ref 0–7)
ERYTHROCYTE [DISTWIDTH] IN BLOOD BY AUTOMATED COUNT: 12.6 % (ref 11.5–14.5)
EST. AVERAGE GLUCOSE BLD GHB EST-MCNC: 103 MG/DL
GLOBULIN SER CALC-MCNC: 3.2 G/DL (ref 2–4)
GLUCOSE SERPL-MCNC: 90 MG/DL (ref 65–100)
HBA1C MFR BLD: 5.2 % (ref 4–5.6)
HCT VFR BLD AUTO: 40.1 % (ref 35–47)
HDLC SERPL-MCNC: 62 MG/DL
HDLC SERPL: 2.6 (ref 0–5)
HGB BLD-MCNC: 12.9 G/DL (ref 11.5–16)
IMM GRANULOCYTES # BLD AUTO: 0 K/UL (ref 0–0.04)
IMM GRANULOCYTES NFR BLD AUTO: 0 % (ref 0–0.5)
LDLC SERPL CALC-MCNC: 91.4 MG/DL (ref 0–100)
LYMPHOCYTES # BLD: 1.9 K/UL (ref 0.8–3.5)
LYMPHOCYTES NFR BLD: 42 % (ref 12–49)
MCH RBC QN AUTO: 30.1 PG (ref 26–34)
MCHC RBC AUTO-ENTMCNC: 32.2 G/DL (ref 30–36.5)
MCV RBC AUTO: 93.7 FL (ref 80–99)
MONOCYTES # BLD: 0.5 K/UL (ref 0–1)
MONOCYTES NFR BLD: 11 % (ref 5–13)
NEUTS SEG # BLD: 1.6 K/UL (ref 1.8–8)
NEUTS SEG NFR BLD: 36 % (ref 32–75)
NRBC # BLD: 0 K/UL (ref 0–0.01)
NRBC BLD-RTO: 0 PER 100 WBC
PLATELET # BLD AUTO: 191 K/UL (ref 150–400)
PMV BLD AUTO: 12.9 FL (ref 8.9–12.9)
POTASSIUM SERPL-SCNC: 4.1 MMOL/L (ref 3.5–5.1)
PROT SERPL-MCNC: 6.9 G/DL (ref 6.4–8.2)
RBC # BLD AUTO: 4.28 M/UL (ref 3.8–5.2)
SODIUM SERPL-SCNC: 139 MMOL/L (ref 136–145)
TRIGL SERPL-MCNC: 33 MG/DL
VLDLC SERPL CALC-MCNC: 6.6 MG/DL
WBC # BLD AUTO: 4.3 K/UL (ref 3.6–11)

## 2024-06-27 LAB — TSH SERPL DL<=0.05 MIU/L-ACNC: 1.99 UIU/ML (ref 0.45–4.5)

## 2024-07-02 SDOH — HEALTH STABILITY: PHYSICAL HEALTH: ON AVERAGE, HOW MANY DAYS PER WEEK DO YOU ENGAGE IN MODERATE TO STRENUOUS EXERCISE (LIKE A BRISK WALK)?: 3 DAYS

## 2024-07-02 SDOH — HEALTH STABILITY: PHYSICAL HEALTH: ON AVERAGE, HOW MANY MINUTES DO YOU ENGAGE IN EXERCISE AT THIS LEVEL?: 40 MIN

## 2024-07-03 ENCOUNTER — OFFICE VISIT (OUTPATIENT)
Age: 39
End: 2024-07-03
Payer: COMMERCIAL

## 2024-07-03 VITALS
OXYGEN SATURATION: 97 % | RESPIRATION RATE: 14 BRPM | BODY MASS INDEX: 22.85 KG/M2 | SYSTOLIC BLOOD PRESSURE: 113 MMHG | HEIGHT: 67 IN | HEART RATE: 61 BPM | WEIGHT: 145.6 LBS | DIASTOLIC BLOOD PRESSURE: 71 MMHG

## 2024-07-03 DIAGNOSIS — F33.1 MODERATE EPISODE OF RECURRENT MAJOR DEPRESSIVE DISORDER (HCC): Primary | ICD-10-CM

## 2024-07-03 DIAGNOSIS — F41.9 ANXIETY: ICD-10-CM

## 2024-07-03 DIAGNOSIS — F90.0 ATTENTION-DEFICIT HYPERACTIVITY DISORDER, PREDOMINANTLY INATTENTIVE TYPE: ICD-10-CM

## 2024-07-03 PROBLEM — U07.1 COVID-19: Status: RESOLVED | Noted: 2022-03-28 | Resolved: 2024-07-03

## 2024-07-03 PROBLEM — E78.00 HYPERCHOLESTEROLEMIA: Status: RESOLVED | Noted: 2022-03-28 | Resolved: 2024-07-03

## 2024-07-03 PROBLEM — D22.9 MULTIPLE NEVI: Status: RESOLVED | Noted: 2022-03-28 | Resolved: 2024-07-03

## 2024-07-03 PROBLEM — R73.03 PREDIABETES: Status: RESOLVED | Noted: 2022-03-28 | Resolved: 2024-07-03

## 2024-07-03 PROBLEM — R53.83 FATIGUE, UNSPECIFIED TYPE: Status: RESOLVED | Noted: 2022-08-19 | Resolved: 2024-07-03

## 2024-07-03 PROCEDURE — 99214 OFFICE O/P EST MOD 30 MIN: CPT | Performed by: INTERNAL MEDICINE

## 2024-07-03 RX ORDER — METHYLPHENIDATE HYDROCHLORIDE 18 MG/1
18 TABLET, EXTENDED RELEASE ORAL DAILY
Qty: 30 TABLET | Refills: 0 | Status: SHIPPED | OUTPATIENT
Start: 2024-07-03 | End: 2024-08-02

## 2024-07-03 RX ORDER — BUPROPION HYDROCHLORIDE 150 MG/1
150 TABLET ORAL EVERY MORNING
Qty: 90 TABLET | Refills: 4 | Status: SHIPPED | OUTPATIENT
Start: 2024-07-03

## 2024-07-03 ASSESSMENT — PATIENT HEALTH QUESTIONNAIRE - PHQ9
SUM OF ALL RESPONSES TO PHQ QUESTIONS 1-9: 0
SUM OF ALL RESPONSES TO PHQ9 QUESTIONS 1 & 2: 0
2. FEELING DOWN, DEPRESSED OR HOPELESS: NOT AT ALL
SUM OF ALL RESPONSES TO PHQ QUESTIONS 1-9: 0
1. LITTLE INTEREST OR PLEASURE IN DOING THINGS: NOT AT ALL
SUM OF ALL RESPONSES TO PHQ QUESTIONS 1-9: 0
SUM OF ALL RESPONSES TO PHQ QUESTIONS 1-9: 0

## 2024-07-03 NOTE — PROGRESS NOTES
Tete Lopez (:  1985) is a 39 y.o. female,Established patient, here for evaluation of the following chief complaint(s):  New Patient      Assessment & Plan     1. Moderate episode of recurrent major depressive disorder (HCC)  Stable, on bupropion.  Continue current medication.  - buPROPion (WELLBUTRIN XL) 150 MG extended release tablet; Take 1 tablet by mouth every morning  Dispense: 90 tablet; Refill: 4    2. Anxiety  Stable, on bupropion.  Continue current medication.  - buPROPion (WELLBUTRIN XL) 150 MG extended release tablet; Take 1 tablet by mouth every morning  Dispense: 90 tablet; Refill: 4    3. Attention-deficit hyperactivity disorder, predominantly inattentive type  Overall stable on methylphenidate, but she feels she could get more benefit if she tried an alternative.  We discussed trying brand Concerta or Vyvanse.  She will think about this for the next couple weeks and let me know what she decides.  - Methylphenidate HCl ER 18 MG TB24; Take 18 mg by mouth daily for 30 days. Max Daily Amount: 18 mg  Dispense: 30 tablet; Refill: 0             Subjective   HPI    Depression/anxiety: She gets depressed when her anxiety level is high, does better on bupropion  She was on duloxetine, but had sexual side effects, though it worked. She is allergic to sertraline.     Ms. Lopez stopped the methylphenidate for a while, thinking it was not working, but she did note she is better on the medication. Her issues are with concentration and distraction. She was diagnosed in college, has been on medication since her 20s. She has trouble sleeping when she is on higher dose.       Current Outpatient Medications   Medication Instructions    ACCUTANE 30 MG chemo capsule     buPROPion (WELLBUTRIN XL) 150 mg, Oral, EVERY MORNING    Methylphenidate HCl ER 18 mg, Oral, DAILY    Multiple Vitamins-Minerals (MULTIVITAMIN ADULT EXTRA C PO) 1 tablet, Oral, DAILY    spironolactone (ALDACTONE) 100 mg, DAILY        Allergies

## 2024-08-04 DIAGNOSIS — F90.0 ATTENTION-DEFICIT HYPERACTIVITY DISORDER, PREDOMINANTLY INATTENTIVE TYPE: ICD-10-CM

## 2024-08-05 RX ORDER — METHYLPHENIDATE HYDROCHLORIDE 18 MG/1
1 TABLET, EXTENDED RELEASE ORAL DAILY
Qty: 30 TABLET | Refills: 0 | Status: SHIPPED | OUTPATIENT
Start: 2024-08-05 | End: 2024-09-04

## 2024-08-05 RX ORDER — METHYLPHENIDATE HYDROCHLORIDE 18 MG/1
1 TABLET, EXTENDED RELEASE ORAL DAILY
Qty: 30 TABLET | Refills: 0 | Status: SHIPPED | OUTPATIENT
Start: 2024-09-05 | End: 2024-10-05

## 2024-08-05 RX ORDER — METHYLPHENIDATE HYDROCHLORIDE 18 MG/1
18 TABLET, EXTENDED RELEASE ORAL DAILY
Qty: 30 TABLET | Refills: 0 | Status: SHIPPED | OUTPATIENT
Start: 2024-10-05 | End: 2024-11-04

## 2024-08-05 NOTE — TELEPHONE ENCOUNTER
PCP: Anh Armstrong MD    Last appt: 7/3/2024   Future Appointments   Date Time Provider Department Center   1/8/2025 10:00 AM Anh Armstrong MD [facility] DEP       Requested Prescriptions     Pending Prescriptions Disp Refills    Methylphenidate HCl ER 18 MG TB24 30 tablet 0     Sig: Take 18 mg by mouth daily for 30 days. Max Daily Amount: 18 mg     Rx in pending for provider review and approval.  Tiffany \"Warren\" CASSANDRA Rios

## 2024-08-15 ENCOUNTER — TELEPHONE (OUTPATIENT)
Age: 39
End: 2024-08-15

## 2024-08-15 RX ORDER — FLUCONAZOLE 150 MG/1
150 TABLET ORAL ONCE
Qty: 1 TABLET | Refills: 0 | Status: SHIPPED | OUTPATIENT
Start: 2024-08-15 | End: 2024-08-15

## 2024-08-15 NOTE — TELEPHONE ENCOUNTER
Patient called in, name and  verified. Patient is calling as she was recently put on abx for strep throat and has found herself to have a yeast infection. She reports burning, itching and white thick discharge. She has tried Monistat 7 OTC and finds that the burning is unbearable. She is requesting a script for diflucan if possible.    Pharmacy on file has been verified. Last OV 2023.

## 2024-08-22 DIAGNOSIS — N92.4 EXCESSIVE BLEEDING IN PREMENOPAUSAL PERIOD: Primary | ICD-10-CM

## 2024-08-28 ENCOUNTER — HOSPITAL ENCOUNTER (OUTPATIENT)
Facility: HOSPITAL | Age: 39
Setting detail: RECURRING SERIES
Discharge: HOME | End: 2024-08-31
Payer: COMMERCIAL

## 2024-08-28 PROCEDURE — 97112 NEUROMUSCULAR REEDUCATION: CPT | Performed by: PHYSICAL THERAPIST

## 2024-08-28 PROCEDURE — 97162 PT EVAL MOD COMPLEX 30 MIN: CPT | Performed by: PHYSICAL THERAPIST

## 2024-08-28 PROCEDURE — 97535 SELF CARE MNGMENT TRAINING: CPT | Performed by: PHYSICAL THERAPIST

## 2024-08-28 NOTE — THERAPY EVALUATION
Physical Therapy at [place],   a part of [address], [address]  [place] [address]  Phone: [phone]  Fax: [phone]       PHYSICAL THERAPY - MEDICARE EVALUATION/PLAN OF CARE NOTE (updated 3/23)      Date: 2024          Patient Name:  Tete Lopez :  1985   Medical   Diagnosis:  Pain in left shoulder [M25.512] Treatment Diagnosis:  M25.512  LEFT SHOULDER PAIN    Referral Source:  Referral, Self Provider #:  [id number]                Insurance: Payor: [facility] / Plan: [facility] EMPLOYEES / Product Type: *No Product type* /      Patient  verified yes     Visit #   Current  / Total 1 12   Time   In / Out 130  PM   Total Treatment Time 65   Total Timed Codes 25   1:1 Treatment Time 25    [facility] Totals Reminder:  bill using total billable   min of TIMED therapeutic procedures and modalities.   8-22 min = 1 unit; 23-37 min = 2 units; 38-52 min = 3 units;  53-67 min = 4 units; 68-82 min = 5 units           SUBJECTIVE  Pain Level (0-10 scale): 1-2  []constant [x]intermittent []improving [x]worsening []no change since onset    Any medication changes, allergies to medications, adverse drug reactions, diagnosis change, or new procedure performed?: [x] No    [] Yes (see summary sheet for update)  Medications: Verified on Patient Summary List    Subjective functional status/changes:     Pt c/o L shoulder pain \"it feels like a zing when I put on a sports bra, backpack, reaching back\"  \"I'm getting pain in my deltoid and in my trap\" \"my neck hurts too\" \"I got a massage in May but I was feeling good when I got and she said I had tension in my neck\"  Start of Care: 2024  Onset Date: Middle   Current symptoms/Complaints: L shoulder pain  Mechanism of Injury: Maybe related to carrying my 2 yo  PLOF: Pt enjoys cycling, weight lifting, stewart   Limitations to PLOF/Activity or Recreational Limitations: See above  Work Hx: [employer] of

## 2024-09-06 ENCOUNTER — HOSPITAL ENCOUNTER (OUTPATIENT)
Facility: HOSPITAL | Age: 39
Setting detail: RECURRING SERIES
Discharge: HOME | End: 2024-09-09
Payer: COMMERCIAL

## 2024-09-06 PROCEDURE — 97112 NEUROMUSCULAR REEDUCATION: CPT | Performed by: PHYSICAL THERAPIST

## 2024-09-06 PROCEDURE — 97140 MANUAL THERAPY 1/> REGIONS: CPT | Performed by: PHYSICAL THERAPIST

## 2024-09-13 ENCOUNTER — HOSPITAL ENCOUNTER (OUTPATIENT)
Facility: HOSPITAL | Age: 39
Setting detail: RECURRING SERIES
Discharge: HOME | End: 2024-09-16
Payer: COMMERCIAL

## 2024-09-13 PROCEDURE — 97110 THERAPEUTIC EXERCISES: CPT | Performed by: PHYSICAL THERAPIST

## 2024-09-13 PROCEDURE — 97140 MANUAL THERAPY 1/> REGIONS: CPT | Performed by: PHYSICAL THERAPIST

## 2024-09-13 PROCEDURE — 97112 NEUROMUSCULAR REEDUCATION: CPT | Performed by: PHYSICAL THERAPIST

## 2024-09-20 ENCOUNTER — HOSPITAL ENCOUNTER (OUTPATIENT)
Facility: HOSPITAL | Age: 39
Setting detail: RECURRING SERIES
Discharge: HOME | End: 2024-09-23
Payer: COMMERCIAL

## 2024-09-20 PROCEDURE — 97110 THERAPEUTIC EXERCISES: CPT | Performed by: PHYSICAL THERAPIST

## 2024-09-20 PROCEDURE — 97112 NEUROMUSCULAR REEDUCATION: CPT | Performed by: PHYSICAL THERAPIST

## 2024-09-20 PROCEDURE — 97140 MANUAL THERAPY 1/> REGIONS: CPT | Performed by: PHYSICAL THERAPIST

## 2024-09-27 ENCOUNTER — HOSPITAL ENCOUNTER (OUTPATIENT)
Facility: HOSPITAL | Age: 39
Setting detail: RECURRING SERIES
Discharge: HOME | End: 2024-09-30
Payer: COMMERCIAL

## 2024-09-27 PROCEDURE — 97140 MANUAL THERAPY 1/> REGIONS: CPT | Performed by: PHYSICAL THERAPIST

## 2024-09-27 PROCEDURE — 97112 NEUROMUSCULAR REEDUCATION: CPT | Performed by: PHYSICAL THERAPIST

## 2024-09-27 PROCEDURE — 97110 THERAPEUTIC EXERCISES: CPT | Performed by: PHYSICAL THERAPIST

## 2024-09-27 NOTE — PROGRESS NOTES
PHYSICAL THERAPY - DAILY TREATMENT NOTE (updated 3/23)      Date: 2024          Patient Name:  Tete Lopez :  1985   Medical   Diagnosis:  Pain in left shoulder [M25.512] Treatment Diagnosis:  M25.512  LEFT SHOULDER PAIN    Referral Source:  Referral, Self Insurance:   Payor: [facility] / Plan: [facility] EMPLOYEES / Product Type: *No Product type* /                     Patient  verified yes     Visit #   Current  / Total 5 12   Time   In / Out 1015 AM 1115 AM   Total Treatment Time 60   Total Timed Codes 45         SUBJECTIVE    Pain Level (0-10 scale): 1/10    Any medication changes, allergies to medications, adverse drug reactions, diagnosis change, or new procedure performed?: [x] No    [] Yes (see summary sheet for update)  Medications: Verified on Patient Summary List    Subjective functional status/changes:     Pt reports she is noticing some numbness in her thumb  Pt reports compliance with HEP  Pt reports she feels tight  Pt reports she is struggling with UI    OBJECTIVE      Therapeutic Procedures:  Tx Min Billable or 1:1 Min (if diff from Tx Min) Procedure, Rationale, Specifics   20  86307 Neuromuscular Re-Education (timed):  improve balance, coordination, kinesthetic sense, posture, core stability and proprioception to improve patient's ability to develop conscious control of individual muscles and awareness of position of extremities in order to progress to PLOF and address remaining functional goals. (see flow sheet as applicable)     Details if applicable:     15  38191 Therapeutic Exercise (timed):  increase ROM, strength, coordination, balance, and proprioception to improve patient's ability to progress to PLOF and address remaining functional goals. (see flow sheet as applicable)     Details if applicable:     10  10294 Manual Therapy (timed):  decrease pain, increase ROM, increase tissue extensibility, decrease edema, correct positional vertigo, decrease trigger points, and increase 
the care of your patient.

## 2024-09-30 DIAGNOSIS — N39.3 STRESS INCONTINENCE: Primary | ICD-10-CM

## 2024-10-02 ENCOUNTER — HOSPITAL ENCOUNTER (OUTPATIENT)
Facility: HOSPITAL | Age: 39
Setting detail: RECURRING SERIES
Discharge: HOME | End: 2024-10-05
Payer: COMMERCIAL

## 2024-10-02 PROCEDURE — 97140 MANUAL THERAPY 1/> REGIONS: CPT | Performed by: PHYSICAL THERAPIST

## 2024-10-02 PROCEDURE — 97110 THERAPEUTIC EXERCISES: CPT | Performed by: PHYSICAL THERAPIST

## 2024-10-02 NOTE — PROGRESS NOTES
functional goals.  The manual therapy interventions were performed at a separate and distinct time from the therapeutic activities interventions . (see flow sheet as applicable)    Details if applicable:  Post joint mobs Grade II-III, MFR L subscap, shoulder PROM to tolerance,   45     Total Total         Modalities Rationale:     decrease pain and increase tissue extensibility to improve patient's ability to progress to PLOF and address remaining functional goals.       min [] Estim Unattended,             type/location:       []  w/ice    []  w/heat        min [] Estim Attended,             type/location:       []  w/ice   []  w/heat         []  w/US   []  TENS insruct            min []  Mechanical Traction,        type/lbs:        []  pro      []  sup           []  int       []  cont            []  before manual           []  after manual     min []  Ultrasound,         settings/location:      min  unbilled []  Ice     [x]  Heat            location/position: Supine, neck back and chest        min []  Vasopneumatic Device,      press/temp:   pre-treatment girth :    post-treatment girth :    measured at (landmark       location) :   If using vaso (only need to measure limb vaso being performed on)        min []  Other:        Skin assessment post-treatment (if applicable):    [x]  intact    []  redness- no adverse reaction                 []redness - adverse reaction:          [x]  Patient Education billed concurrently with other procedures   [x] Review HEP    [] Progressed/Changed HEP, detail:    [] Other detail:         Other Objective/Functional Measures  HAdDT - B  HG IR +L  HG ER +L  HG flexion +L    All negative after today's interventions  Pt able to relax shoulder after paying attention to pelvic floor during breathing  Pain Level at end of session (0-10 scale): 0 \"I feel good, I dont think I need heat or ice\"      Assessment   Improved shoulder mobility after today's interventions, able to advance several

## 2024-10-09 ENCOUNTER — HOSPITAL ENCOUNTER (OUTPATIENT)
Facility: HOSPITAL | Age: 39
Setting detail: RECURRING SERIES
Discharge: HOME | End: 2024-10-12
Payer: COMMERCIAL

## 2024-10-09 PROCEDURE — 97016 VASOPNEUMATIC DEVICE THERAPY: CPT | Performed by: PHYSICAL THERAPIST

## 2024-10-09 PROCEDURE — 97112 NEUROMUSCULAR REEDUCATION: CPT | Performed by: PHYSICAL THERAPIST

## 2024-10-09 PROCEDURE — 97140 MANUAL THERAPY 1/> REGIONS: CPT | Performed by: PHYSICAL THERAPIST

## 2024-10-09 NOTE — PROGRESS NOTES
increase tissue extensibility, decrease edema, correct positional vertigo, decrease trigger points, and increase postural awareness to improve patient's ability to progress to PLOF and address remaining functional goals.  The manual therapy interventions were performed at a separate and distinct time from the therapeutic activities interventions . (see flow sheet as applicable)    Details if applicable:  Post joint mobs Grade II-III, MFR L subscap, shoulder PROM to tolerance,   55     Total Total         Modalities Rationale:     decrease pain and increase tissue extensibility to improve patient's ability to progress to PLOF and address remaining functional goals.       min [] Estim Unattended,             type/location:       []  w/ice    []  w/heat        min [] Estim Attended,             type/location:       []  w/ice   []  w/heat         []  w/US   []  TENS insruct            min []  Mechanical Traction,        type/lbs:        []  pro      []  sup           []  int       []  cont            []  before manual           []  after manual     min []  Ultrasound,         settings/location:      min  unbilled []  Ice     [x]  Heat            location/position: Supine, neck back and chest   15     min [x]  Vasopneumatic Device,      press/temp: 34/ lo   pre-treatment girth :    post-treatment girth :    measured at (landmark       location) :   If using vaso (only need to measure limb vaso being performed on)        min []  Other:        Skin assessment post-treatment (if applicable):    [x]  intact    []  redness- no adverse reaction                 []redness - adverse reaction:          [x]  Patient Education billed concurrently with other procedures   [x] Review HEP    [] Progressed/Changed HEP, detail:    [] Other detail:         Other Objective/Functional Measures  HAdDT - B  HG IR +L  HG ER +L  HG flexion +L    Difficulty relaxing shoulder/ improving shoulder pROM this visit  Pain Level at end of session (0-10

## 2024-10-09 NOTE — PROGRESS NOTES
Ken, PT       10/9/2024       11:55 AM    If you have any questions/comments please contact us directly at [phone].   Thank you for allowing us to assist in the care of your patient.

## 2024-10-14 ENCOUNTER — APPOINTMENT (OUTPATIENT)
Facility: HOSPITAL | Age: 39
End: 2024-10-14
Payer: COMMERCIAL

## 2024-10-15 ENCOUNTER — APPOINTMENT (OUTPATIENT)
Facility: HOSPITAL | Age: 39
End: 2024-10-15
Payer: COMMERCIAL

## 2024-10-23 ENCOUNTER — HOSPITAL ENCOUNTER (OUTPATIENT)
Facility: HOSPITAL | Age: 39
Setting detail: RECURRING SERIES
Discharge: HOME | End: 2024-10-26
Payer: COMMERCIAL

## 2024-10-23 PROCEDURE — 97112 NEUROMUSCULAR REEDUCATION: CPT | Performed by: PHYSICAL THERAPIST

## 2024-10-23 PROCEDURE — 97110 THERAPEUTIC EXERCISES: CPT | Performed by: PHYSICAL THERAPIST

## 2024-10-23 PROCEDURE — 97016 VASOPNEUMATIC DEVICE THERAPY: CPT | Performed by: PHYSICAL THERAPIST

## 2024-10-23 PROCEDURE — 97140 MANUAL THERAPY 1/> REGIONS: CPT | Performed by: PHYSICAL THERAPIST

## 2024-10-23 NOTE — PROGRESS NOTES
PHYSICAL THERAPY - DAILY TREATMENT NOTE (updated 3/23)      Date: 10/23/2024          Patient Name:  Tete Lopez :  1985   Medical   Diagnosis:  Pain in left shoulder [M25.512] Treatment Diagnosis:  M25.512  LEFT SHOULDER PAIN    Referral Source:  Referral, Self Insurance:   Payor: [facility] / Plan: [employer] EMPLOYEES / Product Type: *No Product type* /                     Patient  verified yes     Visit #   Current  / Total 8 12   Time   In / Out 1030 AM 1135 AM   Total Treatment Time 65   Total Timed Codes 50         SUBJECTIVE    Pain Level (0-10 scale): 1/10    Any medication changes, allergies to medications, adverse drug reactions, diagnosis change, or new procedure performed?: [x] No    [] Yes (see summary sheet for update)  Medications: Verified on Patient Summary List    Subjective functional status/changes:     Pt reports she has had more shoulder pain since last visit \"It hurts to do the downward dog\"  Pt has also had a cold    OBJECTIVE      Therapeutic Procedures:  Tx Min Billable or 1:1 Min (if diff from Tx Min) Procedure, Rationale, Specifics   30  89084 Neuromuscular Re-Education (timed):  improve balance, coordination, kinesthetic sense, posture, core stability and proprioception to improve patient's ability to develop conscious control of individual muscles and awareness of position of extremities in order to progress to PLOF and address remaining functional goals. (see flow sheet as applicable)     Details if applicable:     10  24500 Therapeutic Exercise (timed):  increase ROM, strength, coordination, balance, and proprioception to improve patient's ability to progress to PLOF and address remaining functional goals. (see flow sheet as applicable)     Details if applicable:     10  79220 Manual Therapy (timed):  decrease pain, increase ROM, increase tissue extensibility, decrease edema, correct positional vertigo, decrease trigger points, and increase postural awareness to improve patient's

## 2024-10-29 ENCOUNTER — HOSPITAL ENCOUNTER (OUTPATIENT)
Facility: HOSPITAL | Age: 39
Setting detail: RECURRING SERIES
Discharge: HOME | End: 2024-11-01
Attending: OBSTETRICS & GYNECOLOGY
Payer: COMMERCIAL

## 2024-10-29 PROCEDURE — 97112 NEUROMUSCULAR REEDUCATION: CPT

## 2024-10-29 PROCEDURE — 97535 SELF CARE MNGMENT TRAINING: CPT

## 2024-10-29 PROCEDURE — 97162 PT EVAL MOD COMPLEX 30 MIN: CPT

## 2024-10-29 NOTE — THERAPY EVALUATION
Physical Therapy at [place],   a part of [address], [address]  [place] [address]  Phone: [phone]  Fax: [phone]       PHYSICAL THERAPY - EVALUATION/PLAN OF CARE NOTE (updated 3/23)      Date: 10/29/2024          Patient Name:  Tete Lopez :  1985   Medical   Diagnosis:  Stress incontinence [N39.3] Treatment Diagnosis:  N39.3    STRESS INCONTINENCE (FEMALE) (MALE)    Referral Source:  Elza Villatoro MD Provider #:  [id number]                Insurance: Payor: [facility] / Plan: [facility] EMPLOYEES / Product Type: *No Product type* /      Patient  verified yes     Visit #   Current  / Total 1 16   Time   In / Out 1015 1130   Total Treatment Time 75   Total Timed Codes 45         SUBJECTIVE  Pain Level (0-10 scale): 5/10  []constant []intermittent []improving []worsening []no change since onset    Any medication changes, allergies to medications, adverse drug reactions, diagnosis change, or new procedure performed?: [x] No    [] Yes (see summary sheet for update)  Medications: Verified on Patient Summary List    Subjective functional status/changes:     Patient has been seeing Ivanna Rogers over the past year for right shoulder pain.  She has had 3 vaginal deliveries and participated in pelvic PT for 1-2 years with minimal success.  She reports that she worked out 'hard' after her 1st delivery with lots of SASCHA.  Her second  with grade 2 tearing.  Able to return to high level work-outs with the help of pelvic PT.  C/o  SASCHA with cough/laugh/jumping.  Experiencing some mid-thoracic pain, thinks shoulder contributes.  She is having BM of type 2-4 every other day.  She will take psyllium husk occasionally.  Taking magnesium glycinate    Start of Care: 10/29/2024  Onset Date: see above  Current symptoms/Complaints: see above  Mechanism of Injury: gradual  PLOF: Body pump (has not been lifting for 18 mths)  Limitations to PLOF/Activity or

## 2024-10-30 ENCOUNTER — APPOINTMENT (OUTPATIENT)
Facility: HOSPITAL | Age: 39
End: 2024-10-30
Payer: COMMERCIAL

## 2024-11-01 ENCOUNTER — HOSPITAL ENCOUNTER (OUTPATIENT)
Facility: HOSPITAL | Age: 39
Discharge: HOME | End: 2024-11-04
Payer: COMMERCIAL

## 2024-11-01 DIAGNOSIS — Z12.31 VISIT FOR SCREENING MAMMOGRAM: ICD-10-CM

## 2024-11-01 PROCEDURE — 77063 BREAST TOMOSYNTHESIS BI: CPT

## 2024-11-06 ENCOUNTER — APPOINTMENT (OUTPATIENT)
Facility: HOSPITAL | Age: 39
End: 2024-11-06
Payer: COMMERCIAL

## 2024-11-06 ENCOUNTER — HOSPITAL ENCOUNTER (OUTPATIENT)
Facility: HOSPITAL | Age: 39
Discharge: HOME | End: 2024-11-09
Attending: OBSTETRICS & GYNECOLOGY
Payer: COMMERCIAL

## 2024-11-06 VITALS — BODY MASS INDEX: 22.76 KG/M2 | WEIGHT: 145 LBS | HEIGHT: 67 IN

## 2024-11-06 DIAGNOSIS — R92.8 ABNORMAL MAMMOGRAM: ICD-10-CM

## 2024-11-06 PROCEDURE — 76642 ULTRASOUND BREAST LIMITED: CPT

## 2024-11-06 PROCEDURE — G0279 TOMOSYNTHESIS, MAMMO: HCPCS

## 2024-11-08 DIAGNOSIS — Z80.3 FAMILY HISTORY OF BREAST CANCER: Primary | ICD-10-CM

## 2024-11-08 NOTE — PROGRESS NOTES
Verbal order obtained from Elza Villatoro MD for breast MRI and a diagnosis of family history of breast cancer. Order read back to MD. Orders signed by this writer and sent for co-sign to MD.

## 2024-11-15 ENCOUNTER — APPOINTMENT (OUTPATIENT)
Facility: HOSPITAL | Age: 39
End: 2024-11-15
Attending: OBSTETRICS & GYNECOLOGY
Payer: COMMERCIAL

## 2024-11-18 ENCOUNTER — HOSPITAL ENCOUNTER (OUTPATIENT)
Facility: HOSPITAL | Age: 39
Setting detail: RECURRING SERIES
Discharge: HOME | End: 2024-11-21
Attending: OBSTETRICS & GYNECOLOGY
Payer: COMMERCIAL

## 2024-11-18 PROCEDURE — 97112 NEUROMUSCULAR REEDUCATION: CPT | Performed by: PHYSICAL THERAPIST

## 2024-11-18 PROCEDURE — 97016 VASOPNEUMATIC DEVICE THERAPY: CPT | Performed by: PHYSICAL THERAPIST

## 2024-11-18 NOTE — PROGRESS NOTES
paraspinals with standing activities.  She has pain with reaching activities in supine.  She has been given Scapular stabilizing activities that do not increase her shoulder pain  Patient will continue to benefit from skilled PT / OT services to modify and progress therapeutic interventions, analyze and address functional mobility deficits, analyze and address ROM deficits, analyze and address strength deficits, analyze and address soft tissue restrictions, analyze and cue for proper movement patterns, analyze and modify for postural abnormalities, analyze and address imbalance/dizziness, and instruct in home and community integration to address functional deficits and attain remaining goals.    Progress toward goals / Updated goals:  []  See Progress Note/Recertification  Slight set back due to shoulder pain which was addressed this visit.    PLAN  Yes  Continue plan of care  Re-Cert Due: -  [x]  Upgrade activities as tolerated  []  Discharge due to:  []  Other:      Ivanna Rogers, PT       11/18/2024       12:56 PM

## 2024-11-25 ENCOUNTER — HOSPITAL ENCOUNTER (OUTPATIENT)
Facility: HOSPITAL | Age: 39
Setting detail: RECURRING SERIES
Discharge: HOME | End: 2024-11-28
Attending: OBSTETRICS & GYNECOLOGY
Payer: COMMERCIAL

## 2024-11-25 PROCEDURE — 97112 NEUROMUSCULAR REEDUCATION: CPT | Performed by: PHYSICAL THERAPIST

## 2024-11-25 PROCEDURE — 97016 VASOPNEUMATIC DEVICE THERAPY: CPT | Performed by: PHYSICAL THERAPIST

## 2024-11-25 NOTE — PROGRESS NOTES
tissue extensibility, decrease edema, correct positional vertigo, decrease trigger points, and increase postural awareness to improve patient's ability to progress to PLOF and address remaining functional goals.  The manual therapy interventions were performed at a separate and distinct time from the therapeutic activities interventions . (see flow sheet as applicable)    Details if applicable:  Post joint mobs Grade II-III, MFR L subscap, shoulder PROM to tolerance,   38     Total Total         Modalities Rationale:     decrease pain and increase tissue extensibility to improve patient's ability to progress to PLOF and address remaining functional goals.       min [] Estim Unattended,             type/location:       []  w/ice    []  w/heat        min [] Estim Attended,             type/location:       []  w/ice   []  w/heat         []  w/US   []  TENS insruct            min []  Mechanical Traction,        type/lbs:        []  pro      []  sup           []  int       []  cont            []  before manual           []  after manual     min []  Ultrasound,         settings/location:      min  unbilled []  Ice     [x]  Heat            location/position: Supine, neck back and chest   15     min [x]  Vasopneumatic Device,      press/temp: 34/ lo   pre-treatment girth :    post-treatment girth :    measured at (landmark       location) :   If using vaso (only need to measure limb vaso being performed on)        min []  Other:        Skin assessment post-treatment (if applicable):    [x]  intact    []  redness- no adverse reaction                 []redness - adverse reaction:          [x]  Patient Education billed concurrently with other procedures   [x] Review HEP    [] Progressed/Changed HEP, detail:    [] Other detail:         Other Objective/Functional Measures  No increase in pain with today's interventions  Difficulty sensing L IO/ TA and disenganging R IO/TA so L sidelying rest position re-introduced, better sense

## 2024-12-04 ENCOUNTER — HOSPITAL ENCOUNTER (OUTPATIENT)
Facility: HOSPITAL | Age: 39
Setting detail: RECURRING SERIES
End: 2024-12-04
Attending: OBSTETRICS & GYNECOLOGY
Payer: COMMERCIAL

## 2024-12-05 ENCOUNTER — HOSPITAL ENCOUNTER (OUTPATIENT)
Age: 39
Discharge: HOME | End: 2024-12-08
Attending: OBSTETRICS & GYNECOLOGY
Payer: COMMERCIAL

## 2024-12-05 DIAGNOSIS — Z80.3 FAMILY HISTORY OF BREAST CANCER: ICD-10-CM

## 2024-12-05 PROCEDURE — A9585 GADOBUTROL INJECTION: HCPCS | Performed by: RADIOLOGY

## 2024-12-05 PROCEDURE — C8908 MRI W/O FOL W/CONT, BREAST,: HCPCS

## 2024-12-05 PROCEDURE — 6360000004 HC RX CONTRAST MEDICATION: Performed by: RADIOLOGY

## 2024-12-05 RX ORDER — GADOBUTROL 604.72 MG/ML
7 INJECTION INTRAVENOUS
Status: COMPLETED | OUTPATIENT
Start: 2024-12-05 | End: 2024-12-05

## 2024-12-05 RX ADMIN — GADOBUTROL 7 ML: 604.72 INJECTION INTRAVENOUS at 10:38

## 2024-12-11 ENCOUNTER — APPOINTMENT (OUTPATIENT)
Facility: HOSPITAL | Age: 39
End: 2024-12-11
Attending: OBSTETRICS & GYNECOLOGY
Payer: COMMERCIAL

## 2024-12-16 ENCOUNTER — APPOINTMENT (OUTPATIENT)
Facility: HOSPITAL | Age: 39
End: 2024-12-16
Attending: OBSTETRICS & GYNECOLOGY
Payer: COMMERCIAL

## 2024-12-20 ENCOUNTER — HOSPITAL ENCOUNTER (OUTPATIENT)
Facility: HOSPITAL | Age: 39
Setting detail: RECURRING SERIES
Discharge: HOME | End: 2024-12-23
Attending: OBSTETRICS & GYNECOLOGY
Payer: COMMERCIAL

## 2024-12-20 PROCEDURE — 97016 VASOPNEUMATIC DEVICE THERAPY: CPT | Performed by: PHYSICAL THERAPIST

## 2024-12-20 PROCEDURE — 97110 THERAPEUTIC EXERCISES: CPT | Performed by: PHYSICAL THERAPIST

## 2024-12-20 PROCEDURE — 97112 NEUROMUSCULAR REEDUCATION: CPT | Performed by: PHYSICAL THERAPIST

## 2024-12-20 NOTE — PROGRESS NOTES
PHYSICAL THERAPY - DAILY TREATMENT NOTE (updated 3/23)      Date: 2024          Patient Name:  Tete Lopez :  1985   Medical   Diagnosis:  Stress incontinence [N39.3] Treatment Diagnosis:  N39.3    STRESS INCONTINENCE (FEMALE) (MALE)    Referral Source:  Elza Villatoro MD Insurance:   Payor: [facility] / Plan: [facility] EMPLOYEES / Product Type: *No Product type* /                     Patient  verified yes     Visit #   Current  / Total 4 16   Time   In / Out 1150  PM   Total Treatment Time 75   Total Timed Codes 60         SUBJECTIVE    Pain Level (0-10 scale): 0/10    Any medication changes, allergies to medications, adverse drug reactions, diagnosis change, or new procedure performed?: [x] No    [] Yes (see summary sheet for update)  Medications: Verified on Patient Summary List    Subjective functional status/changes:     Pt reports she got a cortisone injection 2 weeks ago and is feeling much better    OBJECTIVE      Therapeutic Procedures:  Tx Min Billable or 1:1 Min (if diff from Tx Min) Procedure, Rationale, Specifics   30  47686 Neuromuscular Re-Education (timed):  improve balance, coordination, kinesthetic sense, posture, core stability and proprioception to improve patient's ability to develop conscious control of individual muscles and awareness of position of extremities in order to progress to PLOF and address remaining functional goals. (see flow sheet as applicable)     Details if applicable:     30  14089 Therapeutic Exercise (timed):  increase ROM, strength, coordination, balance, and proprioception to improve patient's ability to progress to PLOF and address remaining functional goals. (see flow sheet as applicable)     Details if applicable:     0  93902 Manual Therapy (timed):  decrease pain, increase ROM, increase tissue extensibility, decrease edema, correct positional vertigo, decrease trigger points, and increase postural awareness to improve patient's ability to progress

## 2025-01-03 ENCOUNTER — HOSPITAL ENCOUNTER (OUTPATIENT)
Facility: HOSPITAL | Age: 40
Setting detail: RECURRING SERIES
Discharge: HOME | End: 2025-01-06
Attending: OBSTETRICS & GYNECOLOGY
Payer: COMMERCIAL

## 2025-01-03 PROCEDURE — 97110 THERAPEUTIC EXERCISES: CPT | Performed by: PHYSICAL THERAPIST

## 2025-01-03 PROCEDURE — 97016 VASOPNEUMATIC DEVICE THERAPY: CPT | Performed by: PHYSICAL THERAPIST

## 2025-01-03 PROCEDURE — 97112 NEUROMUSCULAR REEDUCATION: CPT | Performed by: PHYSICAL THERAPIST

## 2025-01-03 NOTE — PROGRESS NOTES
PHYSICAL THERAPY - DAILY TREATMENT NOTE (updated 3/23)      Date: 1/3/2025          Patient Name:  Tete Lopez :  1985   Medical   Diagnosis:  Stress incontinence [N39.3] Treatment Diagnosis:  N39.3    STRESS INCONTINENCE (FEMALE) (MALE)    Referral Source:  Elza Villatoro MD Insurance:   Payor: [facility] / Plan: [facility] EMPLOYEES / Product Type: *No Product type* /                     Patient  verified yes     Visit #   Current  / Total 4 16   Time   In / Out 11:00 AM 1200 PM   Total Treatment Time 60   Total Timed Codes 45         SUBJECTIVE    Pain Level (0-10 scale): 0/10    Any medication changes, allergies to medications, adverse drug reactions, diagnosis change, or new procedure performed?: [x] No    [] Yes (see summary sheet for update)  Medications: Verified on Patient Summary List    Subjective functional status/changes:     Pt reports she has trouble getting to her PT exercises due to busy holiday season    OBJECTIVE      Therapeutic Procedures:  Tx Min Billable or 1:1 Min (if diff from Tx Min) Procedure, Rationale, Specifics   15  88431 Neuromuscular Re-Education (timed):  improve balance, coordination, kinesthetic sense, posture, core stability and proprioception to improve patient's ability to develop conscious control of individual muscles and awareness of position of extremities in order to progress to PLOF and address remaining functional goals. (see flow sheet as applicable)     Details if applicable:     30  14801 Therapeutic Exercise (timed):  increase ROM, strength, coordination, balance, and proprioception to improve patient's ability to progress to PLOF and address remaining functional goals. (see flow sheet as applicable)     Details if applicable:     0  01914 Manual Therapy (timed):  decrease pain, increase ROM, increase tissue extensibility, decrease edema, correct positional vertigo, decrease trigger points, and increase postural awareness to improve patient's ability to progress

## 2025-01-08 ENCOUNTER — TELEMEDICINE (OUTPATIENT)
Facility: CLINIC | Age: 40
End: 2025-01-08
Payer: COMMERCIAL

## 2025-01-08 DIAGNOSIS — F90.0 ATTENTION DEFICIT HYPERACTIVITY DISORDER (ADHD), PREDOMINANTLY INATTENTIVE TYPE: Primary | ICD-10-CM

## 2025-01-08 DIAGNOSIS — F41.9 ANXIETY: ICD-10-CM

## 2025-01-08 DIAGNOSIS — F33.1 MODERATE EPISODE OF RECURRENT MAJOR DEPRESSIVE DISORDER (HCC): ICD-10-CM

## 2025-01-08 PROCEDURE — 99214 OFFICE O/P EST MOD 30 MIN: CPT | Performed by: INTERNAL MEDICINE

## 2025-01-08 RX ORDER — ESCITALOPRAM OXALATE 10 MG/1
10 TABLET ORAL DAILY
Qty: 30 TABLET | Refills: 3 | Status: SHIPPED | OUTPATIENT
Start: 2025-01-08

## 2025-01-08 RX ORDER — LIOTHYRONINE SODIUM 5 UG/1
2.5 TABLET ORAL DAILY
COMMUNITY
Start: 2024-12-23

## 2025-01-08 RX ORDER — PROGESTERONE 200 MG/1
CAPSULE ORAL
COMMUNITY
Start: 2024-10-07

## 2025-01-08 RX ORDER — ATOMOXETINE 18 MG/1
18 CAPSULE ORAL DAILY
Qty: 30 CAPSULE | Refills: 1 | Status: SHIPPED | OUTPATIENT
Start: 2025-01-08 | End: 2025-03-09

## 2025-01-08 SDOH — ECONOMIC STABILITY: FOOD INSECURITY: WITHIN THE PAST 12 MONTHS, YOU WORRIED THAT YOUR FOOD WOULD RUN OUT BEFORE YOU GOT MONEY TO BUY MORE.: NEVER TRUE

## 2025-01-08 SDOH — ECONOMIC STABILITY: FOOD INSECURITY: WITHIN THE PAST 12 MONTHS, THE FOOD YOU BOUGHT JUST DIDN'T LAST AND YOU DIDN'T HAVE MONEY TO GET MORE.: NEVER TRUE

## 2025-01-08 ASSESSMENT — PATIENT HEALTH QUESTIONNAIRE - PHQ9
2. FEELING DOWN, DEPRESSED OR HOPELESS: NOT AT ALL
SUM OF ALL RESPONSES TO PHQ QUESTIONS 1-9: 0
1. LITTLE INTEREST OR PLEASURE IN DOING THINGS: NOT AT ALL
SUM OF ALL RESPONSES TO PHQ9 QUESTIONS 1 & 2: 0

## 2025-01-08 NOTE — ASSESSMENT & PLAN NOTE
Will try Strattera instead of the methylphenidate.  I advised her to watch for nausea as a side effect as she starts the medication.    Orders:    atomoxetine (STRATTERA) 18 MG capsule; Take 1 capsule by mouth daily

## 2025-01-08 NOTE — ASSESSMENT & PLAN NOTE
Untreated.  She will start low-dose escitalopram 5 mg for 1 week and then increase to 10 mg if tolerating.  I asked her to start this medication about a month after she initiates the Strattera as nausea as a side effect that is possible with both medications.  She will follow-up in 2 months.    Orders:    escitalopram (LEXAPRO) 10 MG tablet; Take 1 tablet by mouth daily Take 1/2 daily for a week, then increase to 1 daily

## 2025-01-08 NOTE — ASSESSMENT & PLAN NOTE
Not a current issue.  She is off the bupropion and would like to try something more focused on her anxiety.  She will start low-dose escitalopram in about a month.

## 2025-01-08 NOTE — PROGRESS NOTES
Tete Lopez, was evaluated through a synchronous (real-time) audio-video encounter. The patient (or guardian if applicable) is aware that this is a billable service, which includes applicable co-pays. This Virtual Visit was conducted with patient's (and/or legal guardian's) consent. Patient identification was verified, and a caregiver was present when appropriate.   The patient was located at Home: [address] [place] [id number]  Provider was located at Facility (Appt Dept): [address] [place] [id number]  Confirm you are appropriately licensed, registered, or certified to deliver care in the state where the patient is located as indicated above. If you are not or unsure, please re-schedule the visit: Yes, I confirm.     Tete Lopez (:  1985) is a Established patient, presenting virtually for evaluation of the following:      Below is the assessment and plan developed based on review of pertinent history, physical exam, labs, studies, and medications.     Assessment & Plan  Attention deficit hyperactivity disorder (ADHD), predominantly inattentive type  Will try Strattera instead of the methylphenidate.  I advised her to watch for nausea as a side effect as she starts the medication.    Orders:    atomoxetine (STRATTERA) 18 MG capsule; Take 1 capsule by mouth daily    Moderate episode of recurrent major depressive disorder (HCC)  Not a current issue.  She is off the bupropion and would like to try something more focused on her anxiety.  She will start low-dose escitalopram in about a month.         Anxiety  Untreated.  She will start low-dose escitalopram 5 mg for 1 week and then increase to 10 mg if tolerating.  I asked her to start this medication about a month after she initiates the Strattera as nausea as a side effect that is possible with both medications.  She will follow-up in 2 months.    Orders:    escitalopram (LEXAPRO) 10 MG tablet; Take 1 tablet by mouth daily Take

## 2025-01-10 ENCOUNTER — APPOINTMENT (OUTPATIENT)
Facility: HOSPITAL | Age: 40
End: 2025-01-10
Attending: OBSTETRICS & GYNECOLOGY
Payer: COMMERCIAL

## 2025-02-05 ENCOUNTER — HOSPITAL ENCOUNTER (OUTPATIENT)
Facility: HOSPITAL | Age: 40
Setting detail: RECURRING SERIES
Discharge: HOME | End: 2025-02-08
Attending: OBSTETRICS & GYNECOLOGY
Payer: COMMERCIAL

## 2025-02-05 PROCEDURE — 97110 THERAPEUTIC EXERCISES: CPT | Performed by: PHYSICAL THERAPIST

## 2025-02-05 PROCEDURE — 97112 NEUROMUSCULAR REEDUCATION: CPT | Performed by: PHYSICAL THERAPIST

## 2025-02-05 NOTE — PROGRESS NOTES
PHYSICAL THERAPY - DAILY TREATMENT NOTE (updated 3/23)      Date: 2025          Patient Name:  Tete Lopez :  1985   Medical   Diagnosis:  Stress incontinence [N39.3] Treatment Diagnosis:  N39.3    STRESS INCONTINENCE (FEMALE) (MALE)    Referral Source:  Elza Villatoro MD Insurance:   Payor: [facility] / Plan: [facility] EMPLOYEES / Product Type: *No Product type* /                     Patient  verified yes     Visit #   Current  / Total 6 16   Time   In / Out 11:00 AM 1145 AM   Total Treatment Time 45   Total Timed Codes 45         SUBJECTIVE    Pain Level (0-10 scale): 0/10    Any medication changes, allergies to medications, adverse drug reactions, diagnosis change, or new procedure performed?: [x] No    [] Yes (see summary sheet for update)  Medications: Verified on Patient Summary List    Subjective functional status/changes:     Pt reports she is not in pain and she has been working out    OBJECTIVE      Therapeutic Procedures:  Tx Min Billable or 1:1 Min (if diff from Tx Min) Procedure, Rationale, Specifics   15  75903 Neuromuscular Re-Education (timed):  improve balance, coordination, kinesthetic sense, posture, core stability and proprioception to improve patient's ability to develop conscious control of individual muscles and awareness of position of extremities in order to progress to PLOF and address remaining functional goals. (see flow sheet as applicable)     Details if applicable:     30  90400 Therapeutic Exercise (timed):  increase ROM, strength, coordination, balance, and proprioception to improve patient's ability to progress to PLOF and address remaining functional goals. (see flow sheet as applicable)     Details if applicable:     0  98111 Manual Therapy (timed):  decrease pain, increase ROM, increase tissue extensibility, decrease edema, correct positional vertigo, decrease trigger points, and increase postural awareness to improve patient's ability to progress to PLOF and address

## 2025-03-19 ENCOUNTER — OFFICE VISIT (OUTPATIENT)
Age: 40
End: 2025-03-19
Payer: COMMERCIAL

## 2025-03-19 VITALS
BODY MASS INDEX: 21.55 KG/M2 | HEART RATE: 75 BPM | HEIGHT: 67 IN | WEIGHT: 137.3 LBS | OXYGEN SATURATION: 99 % | DIASTOLIC BLOOD PRESSURE: 61 MMHG | RESPIRATION RATE: 16 BRPM | SYSTOLIC BLOOD PRESSURE: 98 MMHG

## 2025-03-19 DIAGNOSIS — E28.2 PCOS (POLYCYSTIC OVARIAN SYNDROME): Primary | ICD-10-CM

## 2025-03-19 PROCEDURE — G2211 COMPLEX E/M VISIT ADD ON: HCPCS | Performed by: INTERNAL MEDICINE

## 2025-03-19 PROCEDURE — 99204 OFFICE O/P NEW MOD 45 MIN: CPT | Performed by: INTERNAL MEDICINE

## 2025-03-19 RX ORDER — TIRZEPATIDE 2.5 MG/.5ML
2.5 INJECTION, SOLUTION SUBCUTANEOUS WEEKLY
Qty: 2 ML | Refills: 0 | Status: SHIPPED | OUTPATIENT
Start: 2025-03-19

## 2025-03-19 NOTE — PROGRESS NOTES
administration. She is advised to maintain a daily caloric intake of 1200 calories.    #anxiety  The patient has a history of anxiety and is currently experiencing increased anxiety, particularly before her menstrual period. She is allergic to Zoloft and has previously taken Wellbutrin. She is advised to start Lexapro at a low dose for anxiety management. If Lexapro is not effective, Prozac may be considered as an alternative.      Follow-up  The patient will follow up in 3 to 4 months.    Copy sent to:Anh Armstrong MD Julia C W Lake, MD [facility]

## 2025-04-01 ENCOUNTER — OFFICE VISIT (OUTPATIENT)
Age: 40
End: 2025-04-01

## 2025-04-01 VITALS
HEIGHT: 67 IN | SYSTOLIC BLOOD PRESSURE: 97 MMHG | OXYGEN SATURATION: 98 % | WEIGHT: 134 LBS | BODY MASS INDEX: 21.03 KG/M2 | HEART RATE: 83 BPM | DIASTOLIC BLOOD PRESSURE: 66 MMHG | RESPIRATION RATE: 16 BRPM | TEMPERATURE: 98.2 F

## 2025-04-01 DIAGNOSIS — H10.33 ACUTE BACTERIAL CONJUNCTIVITIS OF BOTH EYES: ICD-10-CM

## 2025-04-01 DIAGNOSIS — B96.89 ACUTE BACTERIAL SINUSITIS: Primary | ICD-10-CM

## 2025-04-01 DIAGNOSIS — B37.9 ANTIBIOTIC-INDUCED YEAST INFECTION: ICD-10-CM

## 2025-04-01 DIAGNOSIS — J01.90 ACUTE BACTERIAL SINUSITIS: Primary | ICD-10-CM

## 2025-04-01 DIAGNOSIS — T36.95XA ANTIBIOTIC-INDUCED YEAST INFECTION: ICD-10-CM

## 2025-04-01 RX ORDER — TRETINOIN 0.25 MG/G
CREAM TOPICAL
COMMUNITY
Start: 2024-05-15

## 2025-04-01 RX ORDER — MULTIVITAMIN
1 TABLET ORAL DAILY
COMMUNITY

## 2025-04-01 RX ORDER — ERYTHROMYCIN 5 MG/G
OINTMENT OPHTHALMIC
Qty: 1 EACH | Refills: 0 | Status: SHIPPED | OUTPATIENT
Start: 2025-04-01

## 2025-04-01 RX ORDER — FLUCONAZOLE 150 MG/1
150 TABLET ORAL ONCE
Qty: 1 TABLET | Refills: 0 | Status: SHIPPED | OUTPATIENT
Start: 2025-04-01 | End: 2025-04-01

## 2025-04-01 RX ORDER — PHENTERMINE HYDROCHLORIDE 37.5 MG/1
TABLET ORAL
COMMUNITY
End: 2025-04-01 | Stop reason: CLARIF

## 2025-04-01 ASSESSMENT — ENCOUNTER SYMPTOMS
GASTROINTESTINAL NEGATIVE: 1
EYES NEGATIVE: 1
SINUS PRESSURE: 1
ALLERGIC/IMMUNOLOGIC NEGATIVE: 1
RESPIRATORY NEGATIVE: 1
SINUS PAIN: 1

## 2025-04-01 NOTE — PROGRESS NOTES
2025   Tete Lopez (: 1985) is a 39 y.o. female, New patient, here for evaluation of the following chief complaint(s):  Cold Symptoms (Pt c/o cold sxs for over a week, possible sinus infection, cough, fatigue, sore throat. Thursday woke up with pink eye, Friday severely sore throat. Now lot's of mucus in the throat and nose )     ASSESSMENT/PLAN:  Below is the assessment and plan developed based on review of pertinent history, physical exam, labs, studies, and medications.       Assessment & Plan  Acute bacterial sinusitis  The exam does not reveal orbital/periorbital cellulitis, intracranial abscess or meningitis.  Pt does not present with symptoms that are concerning for complicated acute bacterial rhinosinusitis such as high, persistent fevers >102F; periorbital edema, inflammation, or erythema; cranial nerve palsies; abnormal extraocular movements; proptosis; vision changes (double vision or impaired vision); severe headache; altered mental status; or meningeal signs.  Patient is nontoxic appearing and not in need of emergent medical intervention.    -Provided educational material and patient instructions  -Discharged patient with instructions to follow up with PCP  -Advised to go immediately to the ED for worsening or persistent symptoms   Orders:    amoxicillin-clavulanate (AUGMENTIN) 875-125 MG per tablet; Take 1 tablet by mouth 2 times daily for 7 days    Acute bacterial conjunctivitis of both eyes  Patient presents with apparent conjunctivitis on exam. Extensive examination was performed that revealed no foreign body. It is unclear if this infection is due to viral or bacterial source. Patient will be treated for bacterial conjunctivitis. There is no suggestion of globe involvement or endophthalmitis. Patient understands that even after thorough exam, close follow up may be needed if symptoms do not respond to therapy or worsen.    The patient will be discharged home with topical antibiotic

## 2025-04-08 RX ORDER — TIRZEPATIDE 5 MG/.5ML
5 INJECTION, SOLUTION SUBCUTANEOUS
Qty: 2 ML | Refills: 0 | Status: SHIPPED | OUTPATIENT
Start: 2025-04-08

## 2025-04-08 RX ORDER — TIRZEPATIDE 2.5 MG/.5ML
INJECTION, SOLUTION SUBCUTANEOUS
Qty: 2 ML | Refills: 0 | OUTPATIENT
Start: 2025-04-08

## 2025-04-10 LAB
DHEA-S SERPL-MCNC: 165 UG/DL (ref 57.3–279.2)
PROLACTIN SERPL-MCNC: 3.9 NG/ML (ref 4.8–33.4)
T4 FREE SERPL-MCNC: 1.29 NG/DL (ref 0.82–1.77)
TSH SERPL DL<=0.005 MIU/L-ACNC: 1.36 UIU/ML (ref 0.45–4.5)

## 2025-04-12 LAB
TESTOST FREE SERPL-MCNC: 1.7 PG/ML (ref 0–4.2)
TESTOST SERPL-MCNC: 25.8 NG/DL

## 2025-04-16 LAB — 17OHP SERPL-MCNC: 25 NG/DL

## 2025-05-02 ENCOUNTER — RESULTS FOLLOW-UP (OUTPATIENT)
Age: 40
End: 2025-05-02

## 2025-06-12 ENCOUNTER — OFFICE VISIT (OUTPATIENT)
Facility: CLINIC | Age: 40
End: 2025-06-12
Payer: COMMERCIAL

## 2025-06-12 VITALS
SYSTOLIC BLOOD PRESSURE: 113 MMHG | HEART RATE: 63 BPM | HEIGHT: 67 IN | BODY MASS INDEX: 21.03 KG/M2 | WEIGHT: 134 LBS | OXYGEN SATURATION: 97 % | DIASTOLIC BLOOD PRESSURE: 69 MMHG

## 2025-06-12 DIAGNOSIS — E28.2 PCOS (POLYCYSTIC OVARIAN SYNDROME): ICD-10-CM

## 2025-06-12 DIAGNOSIS — Z00.00 ROUTINE HISTORY AND PHYSICAL EXAMINATION OF ADULT: Primary | ICD-10-CM

## 2025-06-12 DIAGNOSIS — Z00.00 ROUTINE HISTORY AND PHYSICAL EXAMINATION OF ADULT: ICD-10-CM

## 2025-06-12 PROCEDURE — 99396 PREV VISIT EST AGE 40-64: CPT | Performed by: INTERNAL MEDICINE

## 2025-06-12 NOTE — PROGRESS NOTES
LMP 06/06/2025 (Exact Date)   SpO2 97%   BMI 20.99 kg/m²      EXAM:    General: well nourished, well appearing, in no distress  HEENT: TMs clear bilaterally, posterior oropharynx is clear without exudate or ulcer, nasal turbinates without inflammation, no purulent discharge noted  Lymph: no cervical, supraclavicular lymphadenopathy noted  Heart: regular, normal rate, no murmurs noted, distal pulses 2+ bilaterally, no pitting peripheral edema  Lungs: good air movement, clear to auscultation bilaterally, no wheezing or rales noted  Abd: Soft, normal bowel sounds, no tenderness, no organomegaly or masses  Psych: full, appropriate affect, normal speech, no SIs  Neuro: alert, oriented x 3             This note was created with the help of speech recognition software (Dragon) and may contain some 'sound alike' errors.

## 2025-06-14 LAB
ALBUMIN SERPL-MCNC: 3.9 G/DL (ref 3.5–5)
ALBUMIN/GLOB SERPL: 1.1 (ref 1.1–2.2)
ALP SERPL-CCNC: 50 U/L (ref 45–117)
ALT SERPL-CCNC: 44 U/L (ref 12–78)
ANION GAP SERPL CALC-SCNC: 5 MMOL/L (ref 2–12)
AST SERPL-CCNC: 34 U/L (ref 15–37)
BASOPHILS # BLD: 0.08 K/UL (ref 0–0.1)
BASOPHILS NFR BLD: 1.5 % (ref 0–1)
BILIRUB SERPL-MCNC: 0.8 MG/DL (ref 0.2–1)
BUN SERPL-MCNC: 18 MG/DL (ref 6–20)
BUN/CREAT SERPL: 19 (ref 12–20)
CALCIUM SERPL-MCNC: 9.6 MG/DL (ref 8.5–10.1)
CHLORIDE SERPL-SCNC: 102 MMOL/L (ref 97–108)
CHOLEST SERPL-MCNC: 172 MG/DL
CO2 SERPL-SCNC: 28 MMOL/L (ref 21–32)
CREAT SERPL-MCNC: 0.94 MG/DL (ref 0.55–1.02)
DIFFERENTIAL METHOD BLD: ABNORMAL
EOSINOPHIL # BLD: 0.71 K/UL (ref 0–0.4)
EOSINOPHIL NFR BLD: 13.3 % (ref 0–7)
ERYTHROCYTE [DISTWIDTH] IN BLOOD BY AUTOMATED COUNT: 12.7 % (ref 11.5–14.5)
EST. AVERAGE GLUCOSE BLD GHB EST-MCNC: 100 MG/DL
GLOBULIN SER CALC-MCNC: 3.4 G/DL (ref 2–4)
GLUCOSE SERPL-MCNC: 91 MG/DL (ref 65–100)
HBA1C MFR BLD: 5.1 % (ref 4–5.6)
HCT VFR BLD AUTO: 41.8 % (ref 35–47)
HDLC SERPL-MCNC: 71 MG/DL
HDLC SERPL: 2.4 (ref 0–5)
HGB BLD-MCNC: 13.7 G/DL (ref 11.5–16)
IMM GRANULOCYTES # BLD AUTO: 0.01 K/UL (ref 0–0.04)
IMM GRANULOCYTES NFR BLD AUTO: 0.2 % (ref 0–0.5)
LDLC SERPL CALC-MCNC: 93.2 MG/DL (ref 0–100)
LYMPHOCYTES # BLD: 2.26 K/UL (ref 0.8–3.5)
LYMPHOCYTES NFR BLD: 42.2 % (ref 12–49)
MCH RBC QN AUTO: 30 PG (ref 26–34)
MCHC RBC AUTO-ENTMCNC: 32.8 G/DL (ref 30–36.5)
MCV RBC AUTO: 91.7 FL (ref 80–99)
MONOCYTES # BLD: 0.55 K/UL (ref 0–1)
MONOCYTES NFR BLD: 10.3 % (ref 5–13)
NEUTS SEG # BLD: 1.74 K/UL (ref 1.8–8)
NEUTS SEG NFR BLD: 32.5 % (ref 32–75)
NRBC # BLD: 0 K/UL (ref 0–0.01)
NRBC BLD-RTO: 0 PER 100 WBC
PLATELET # BLD AUTO: 229 K/UL (ref 150–400)
PMV BLD AUTO: 12.8 FL (ref 8.9–12.9)
POTASSIUM SERPL-SCNC: 4.4 MMOL/L (ref 3.5–5.1)
PROT SERPL-MCNC: 7.3 G/DL (ref 6.4–8.2)
RBC # BLD AUTO: 4.56 M/UL (ref 3.8–5.2)
SODIUM SERPL-SCNC: 135 MMOL/L (ref 136–145)
TRIGL SERPL-MCNC: 39 MG/DL
VLDLC SERPL CALC-MCNC: 7.8 MG/DL
WBC # BLD AUTO: 5.4 K/UL (ref 3.6–11)

## 2025-06-15 ENCOUNTER — RESULTS FOLLOW-UP (OUTPATIENT)
Facility: CLINIC | Age: 40
End: 2025-06-15

## 2025-06-19 ENCOUNTER — OFFICE VISIT (OUTPATIENT)
Age: 40
End: 2025-06-19
Payer: COMMERCIAL

## 2025-06-19 VITALS
WEIGHT: 135 LBS | HEIGHT: 67 IN | OXYGEN SATURATION: 96 % | SYSTOLIC BLOOD PRESSURE: 98 MMHG | TEMPERATURE: 97.6 F | HEART RATE: 77 BPM | RESPIRATION RATE: 16 BRPM | BODY MASS INDEX: 21.19 KG/M2 | DIASTOLIC BLOOD PRESSURE: 61 MMHG

## 2025-06-19 DIAGNOSIS — T14.8XXA BRUISING: Primary | ICD-10-CM

## 2025-06-19 DIAGNOSIS — N94.89 LABIAL SWELLING: ICD-10-CM

## 2025-06-19 LAB
APTT PPP: 25.3 SEC (ref 22.1–31)
FIBRINOGEN PPP-MCNC: 339 MG/DL (ref 200–475)
INR PPP: 1 (ref 0.9–1.1)
PROTHROMBIN TIME: 10.9 SEC (ref 9.2–11.2)
THERAPEUTIC RANGE: NORMAL SECS (ref 58–77)

## 2025-06-19 PROCEDURE — 99213 OFFICE O/P EST LOW 20 MIN: CPT | Performed by: OBSTETRICS & GYNECOLOGY

## 2025-06-19 NOTE — PROGRESS NOTES
Tete Lopez is a 40 y.o. female presents for a problem visit.    Chief Complaint   Patient presents with    Other     Varicose vein     Patient's last menstrual period was 06/06/2025 (exact date).    Last Pap: normal obtained 2 year(s) ago.    The patient is reporting that she noticed on Sunday that her left labia was \"purple\" from varicose veins.  She denies discomfort.    She does state that she has history of vein thrombosis in the back of her head and states that they told her \"she must have a clotting disorder\" but nothing further was does regarding this.          1. Have you been to the ER, urgent care clinic, or hospitalized since your last visit? No    2. Have you seen or consulted any other health care providers outside of the [facility] since your last visit? No     Jacinta Foy RN.  
tirzepatide-weight management (ZEPBOUND) 5 MG/0.5ML SOLN subCUTAneous injection (VIAL) Inject 0.5 mLs into the skin every 7 days 2 mL 0    tretinoin (RETIN-A) 0.025 % cream 1 application in the evening to face Externally Once a day for 30 days      Multiple Vitamin (DAILY VITES) TABS Take 1 tablet by mouth daily      tirzepatide-weight management (ZEPBOUND) 2.5 MG/0.5ML SOLN subCUTAneous injection (VIAL) Inject 0.5 mLs into the skin once a week 2 mL 0    spironolactone (ALDACTONE) 50 MG tablet 2 tablets daily       No current facility-administered medications for this visit.       Allergies   Allergen Reactions    Sertraline Rash       Review of Systems - History as per HPI    Physical Exam    BP 98/61   Pulse 77   Temp 97.6 °F (36.4 °C) (Oral)   Resp 16   Ht 1.702 m (5' 7\")   Wt 61.2 kg (135 lb)   LMP 06/06/2025 (Exact Date)   SpO2 96%   BMI 21.14 kg/m²     Constitutional  Appearance: well-nourished, well developed, alert, in no acute distress    HENT  Head and Face: appears normal    Chest  Respiratory Effort: non-labored breathing  Auscultation: CTAB, normal breath sounds    Cardiovascular  Heart:  Auscultation: regular rate and rhythm without murmur  Extremities: no peripheral edema    Gastrointestinal  Abdominal Examination: abdomen non-tender to palpation, normal bowel sounds, no masses present  Liver and spleen: no hepatomegaly present, spleen not palpable  Hernias: no hernias identified    Genitourinary  External Genitalia: left labia with significant ecchymosis of entire labia majora and extending up to mons pubis, entire labia purple in color, 1-2cm sized area of induration/nodularity anterior left labia, ?hematoma vs. Mass lesion vs. Reactive induration. No significant inguinal lymphadenopathy.  Vagina: normal vaginal vault without central or paravaginal defects, no discharge present, no inflammatory lesions present, no masses present  Bladder: non-tender to palpation  Urethra: appears

## 2025-06-20 ENCOUNTER — RESULTS FOLLOW-UP (OUTPATIENT)
Age: 40
End: 2025-06-20

## 2025-06-23 RX ORDER — TIRZEPATIDE 5 MG/.5ML
INJECTION, SOLUTION SUBCUTANEOUS
Qty: 2 ML | Refills: 0 | Status: SHIPPED | OUTPATIENT
Start: 2025-06-23

## 2025-06-26 ENCOUNTER — HOSPITAL ENCOUNTER (OUTPATIENT)
Dept: VASCULAR SURGERY | Facility: HOSPITAL | Age: 40
Discharge: HOME | End: 2025-06-28
Attending: OBSTETRICS & GYNECOLOGY
Payer: COMMERCIAL

## 2025-06-26 DIAGNOSIS — N94.89 LABIAL SWELLING: ICD-10-CM

## 2025-06-26 DIAGNOSIS — T14.8XXA BRUISING: ICD-10-CM

## 2025-06-26 PROCEDURE — 93971 EXTREMITY STUDY: CPT

## 2025-06-26 PROCEDURE — 93978 VASCULAR STUDY: CPT

## 2025-06-30 ENCOUNTER — OFFICE VISIT (OUTPATIENT)
Age: 40
End: 2025-06-30
Payer: COMMERCIAL

## 2025-06-30 VITALS
BODY MASS INDEX: 23.07 KG/M2 | HEIGHT: 67 IN | OXYGEN SATURATION: 95 % | SYSTOLIC BLOOD PRESSURE: 103 MMHG | RESPIRATION RATE: 16 BRPM | TEMPERATURE: 98 F | WEIGHT: 147 LBS | DIASTOLIC BLOOD PRESSURE: 67 MMHG | HEART RATE: 80 BPM

## 2025-06-30 DIAGNOSIS — N90.89 LESION OF LABIA: Primary | ICD-10-CM

## 2025-06-30 PROCEDURE — 99213 OFFICE O/P EST LOW 20 MIN: CPT | Performed by: OBSTETRICS & GYNECOLOGY

## 2025-06-30 ASSESSMENT — PATIENT HEALTH QUESTIONNAIRE - PHQ9
SUM OF ALL RESPONSES TO PHQ QUESTIONS 1-9: 0
1. LITTLE INTEREST OR PLEASURE IN DOING THINGS: NOT AT ALL
2. FEELING DOWN, DEPRESSED OR HOPELESS: NOT AT ALL

## 2025-06-30 NOTE — PROGRESS NOTES
Identified pt with two pt identifiers(name and ). Reviewed record in preparation for visit and have obtained necessary documentation. All patient medications has been reviewed.    Chief Complaint   Patient presents with    2 Week Follow-Up     Discuss lump          Vitals:    25 1300   BP: 103/67   BP Site: Left Upper Arm   Patient Position: Sitting   BP Cuff Size: Small Adult   Pulse: 80   Resp: 16   Temp: 98 °F (36.7 °C)   TempSrc: Oral   SpO2: 95%   Weight: 66.7 kg (147 lb)   Height: 1.702 m (5' 7\")      .  \"Have you been to the ER, urgent care clinic since your last visit?  Hospitalized since your last visit?\"    no    “Have you seen or consulted any other health care providers outside our system since your last visit?”    no
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
## 2025-07-01 ENCOUNTER — TELEPHONE (OUTPATIENT)
Age: 40
End: 2025-07-01
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
## 2025-07-15 ENCOUNTER — OFFICE VISIT (OUTPATIENT)
Age: 40
End: 2025-07-15
Payer: COMMERCIAL

## 2025-07-15 VITALS
SYSTOLIC BLOOD PRESSURE: 102 MMHG | HEIGHT: 67 IN | HEART RATE: 71 BPM | DIASTOLIC BLOOD PRESSURE: 67 MMHG | BODY MASS INDEX: 23.02 KG/M2

## 2025-07-15 DIAGNOSIS — N90.89 VULVAR HEMATOMA: Primary | ICD-10-CM

## 2025-07-15 PROCEDURE — 99202 OFFICE O/P NEW SF 15 MIN: CPT | Performed by: OBSTETRICS & GYNECOLOGY

## 2025-07-15 ASSESSMENT — PATIENT HEALTH QUESTIONNAIRE - PHQ9
8. MOVING OR SPEAKING SO SLOWLY THAT OTHER PEOPLE COULD HAVE NOTICED. OR THE OPPOSITE, BEING SO FIGETY OR RESTLESS THAT YOU HAVE BEEN MOVING AROUND A LOT MORE THAN USUAL: NOT AT ALL
1. LITTLE INTEREST OR PLEASURE IN DOING THINGS: NOT AT ALL
6. FEELING BAD ABOUT YOURSELF - OR THAT YOU ARE A FAILURE OR HAVE LET YOURSELF OR YOUR FAMILY DOWN: NOT AT ALL
4. FEELING TIRED OR HAVING LITTLE ENERGY: SEVERAL DAYS
7. TROUBLE CONCENTRATING ON THINGS, SUCH AS READING THE NEWSPAPER OR WATCHING TELEVISION: NOT AT ALL
SUM OF ALL RESPONSES TO PHQ QUESTIONS 1-9: 1
10. IF YOU CHECKED OFF ANY PROBLEMS, HOW DIFFICULT HAVE THESE PROBLEMS MADE IT FOR YOU TO DO YOUR WORK, TAKE CARE OF THINGS AT HOME, OR GET ALONG WITH OTHER PEOPLE: SOMEWHAT DIFFICULT
2. FEELING DOWN, DEPRESSED OR HOPELESS: NOT AT ALL
SUM OF ALL RESPONSES TO PHQ QUESTIONS 1-9: 1
9. THOUGHTS THAT YOU WOULD BE BETTER OFF DEAD, OR OF HURTING YOURSELF: NOT AT ALL
5. POOR APPETITE OR OVEREATING: NOT AT ALL
SUM OF ALL RESPONSES TO PHQ QUESTIONS 1-9: 1
3. TROUBLE FALLING OR STAYING ASLEEP: NOT AT ALL
SUM OF ALL RESPONSES TO PHQ QUESTIONS 1-9: 1

## 2025-08-01 ENCOUNTER — OFFICE VISIT (OUTPATIENT)
Age: 40
End: 2025-08-01
Payer: COMMERCIAL

## 2025-08-01 VITALS
HEART RATE: 53 BPM | WEIGHT: 139 LBS | HEIGHT: 67 IN | SYSTOLIC BLOOD PRESSURE: 103 MMHG | BODY MASS INDEX: 21.82 KG/M2 | DIASTOLIC BLOOD PRESSURE: 64 MMHG

## 2025-08-01 DIAGNOSIS — N90.89 LABIAL IRRITATION: ICD-10-CM

## 2025-08-01 DIAGNOSIS — E28.2 PCOS (POLYCYSTIC OVARIAN SYNDROME): ICD-10-CM

## 2025-08-01 DIAGNOSIS — N90.89 LABIAL IRRITATION: Primary | ICD-10-CM

## 2025-08-01 PROCEDURE — 99214 OFFICE O/P EST MOD 30 MIN: CPT | Performed by: INTERNAL MEDICINE

## 2025-08-01 PROCEDURE — G2211 COMPLEX E/M VISIT ADD ON: HCPCS | Performed by: INTERNAL MEDICINE

## 2025-08-01 NOTE — PROGRESS NOTES
Chief Complaint   Patient presents with    Polycystic Ovarian Syndrome     History of Present Illness: Tete Lopez is a 40 y.o. female seen for discussion related to PCOS.    History of Present Illness  The patient is a 39-year-old female who presents for evaluation of polycystic ovarian syndrome, anxiety, and frozen shoulder.    She was diagnosed with PCOS at the age of 22 while residing in [place]. The diagnosis followed a 40-pound weight gain and cessation of menstruation after discontinuing birth control. She has been self-managing her condition through dietary modifications and physical activity. Her menarche occurred at age 12, with irregular menstrual cycles persisting until postpartum. She was on birth control from ages 15 to 22, during which she experienced no fertility issues. She conceived her first child within a month of discontinuing birth control. Between her first and second pregnancies, she developed a blood clot while on birth control. She had been using Mirena IUD and oral contraceptives concurrently for a few months due to severe acne. After removing the Mirena IUD, she continued with oral contraceptives and planned for a second pregnancy. However, a blood clot delayed these plans. Once cleared to try again, it took her 4 to 5 months to conceive. She used Lovenox during her pregnancies and heparin towards the end of her pregnancies. Postpartum, she continued Lovenox. She currently takes spironolactone 50 mg twice daily. Her  has undergone a vasectomy, and she had an endometrial ablation a year ago, which significantly reduced her heavy bleeding. She has never consulted an endocrinologist. She has normal facial hair on her upper lip but none on her nipples or between her legs. An ultrasound prior to her ablation revealed polycystic ovaries. Her maximum weight without GLP-1 was 180 \pounds, and she is currently 138 pounds. She lost 5 pounds on semaglutide. She has not taken metformin. Her

## 2025-08-04 LAB
C TRACH RRNA SPEC QL NAA+PROBE: NEGATIVE
N GONORRHOEA RRNA SPEC QL NAA+PROBE: NEGATIVE
T VAGINALIS RRNA SPEC QL NAA+PROBE: NEGATIVE

## 2025-08-09 LAB
CORTIS F 24H UR-MCNC: 5 UG/L
CORTIS F 24H UR-MRATE: 17 UG/24 HR (ref 6–42)

## 2025-08-10 ENCOUNTER — RESULTS FOLLOW-UP (OUTPATIENT)
Age: 40
End: 2025-08-10

## 2025-08-11 ENCOUNTER — OFFICE VISIT (OUTPATIENT)
Age: 40
End: 2025-08-11
Payer: COMMERCIAL

## 2025-08-11 VITALS
DIASTOLIC BLOOD PRESSURE: 60 MMHG | HEIGHT: 67 IN | BODY MASS INDEX: 21.88 KG/M2 | OXYGEN SATURATION: 96 % | SYSTOLIC BLOOD PRESSURE: 92 MMHG | HEART RATE: 73 BPM | WEIGHT: 139.4 LBS

## 2025-08-11 DIAGNOSIS — I82.90 VENOUS THROMBOSIS: Primary | ICD-10-CM

## 2025-08-11 DIAGNOSIS — I82.90 VENOUS THROMBOSIS: ICD-10-CM

## 2025-08-11 PROCEDURE — 99203 OFFICE O/P NEW LOW 30 MIN: CPT | Performed by: STUDENT IN AN ORGANIZED HEALTH CARE EDUCATION/TRAINING PROGRAM

## 2025-08-13 LAB
IF BLOCK AB SER-ACNC: 1 AU/ML (ref 0–1.1)
PCA AB SER-ACNC: 0.9 UNITS (ref 0–20)
VIT B12 SERPL-MCNC: 1598 PG/ML (ref 232–1245)

## 2025-08-14 ENCOUNTER — RESULTS FOLLOW-UP (OUTPATIENT)
Age: 40
End: 2025-08-14

## 2025-08-14 LAB
HCYS SERPL-SCNC: 5.5 UMOL/L (ref 0–15)
IGA SERPL-MCNC: 350 MG/DL (ref 87–352)
IGE SERPL-ACNC: 45 IU/ML (ref 6–495)
IGG SERPL-MCNC: 1099 MG/DL (ref 586–1602)
IGM SERPL-MCNC: 67 MG/DL (ref 26–217)
METHYLMALONATE SERPL-SCNC: 128 NMOL/L (ref 0–378)

## (undated) DEVICE — SPONGE GZ W4XL4IN COT RADPQ HIGHLY ABSRB

## (undated) DEVICE — PAD,SANITARY,11 IN,MAXI,N-STRL,IND WRAP: Brand: MEDLINE

## (undated) DEVICE — Z DISCONTINUED NO SUB IDED SET EXTN W/ 4 W STPCOCK M SPIN LOK 36IN

## (undated) DEVICE — DRAPE,LITHOTOMY,STERILE: Brand: MEDLINE

## (undated) DEVICE — CATHETER,URETHRAL,REDRUBBER,STRL,16FR: Brand: MEDLINE

## (undated) DEVICE — TRAY PREP DRY W/ PREM GLV 2 APPL 6 SPNG 2 UNDPD 1 OVERWRAP

## (undated) DEVICE — SHEET,DRAPE,UNDERBUTTOCK,GRAD POUCH,PORT: Brand: MEDLINE

## (undated) DEVICE — KIT THERMOABLATION 6MM ENDOMET DEV NOVASURE - SEE COMMENT

## (undated) DEVICE — BASIC SINGLE BASIN BTC-LF: Brand: MEDLINE INDUSTRIES, INC.

## (undated) DEVICE — SYRINGE MED 10ML LUERLOCK TIP W/O SFTY DISP

## (undated) DEVICE — HANDLE LT SNAP ON ULT DURABLE LENS FOR TRUMPF ALC DISPOSABLE

## (undated) DEVICE — GARMENT,MEDLINE,DVT,INT,CALF,MED, GEN2: Brand: MEDLINE

## (undated) DEVICE — SYR 10ML LUER LOK 1/5ML GRAD --

## (undated) DEVICE — TOWEL SURG W17XL27IN STD BLU COT NONFENESTRATED PREWASHED

## (undated) DEVICE — SOLUTION IRRIG 3000ML 0.9% SOD CHL FLX CONT 0797208] ICU MEDICAL INC]

## (undated) DEVICE — GLOVE SURG SZ 6 THK91MIL LTX FREE SYN POLYISOPRENE ANTI

## (undated) DEVICE — Device

## (undated) DEVICE — COVER,TABLE,60X90,STERILE: Brand: MEDLINE

## (undated) DEVICE — GOWN,SIRUS,NONRNF,SETINSLV,XL,20/CS: Brand: MEDLINE

## (undated) DEVICE — STRAP,POSITIONING,KNEE/BODY,FOAM,4X60": Brand: MEDLINE

## (undated) DEVICE — MARKER,SKIN,WI/RULER AND LABELS: Brand: MEDLINE

## (undated) DEVICE — NEEDLE SPNL 22GA L3.5IN BLK HUB S STL REG WALL FIT STYL W/

## (undated) DEVICE — PREMIUM WET SKIN PREP TRAY: Brand: MEDLINE INDUSTRIES, INC.

## (undated) DEVICE — SYRINGE MED 50ML LUERLOCK TIP

## (undated) DEVICE — SYR 50ML LR LCK 1ML GRAD NSAF --

## (undated) DEVICE — STERILE POLYISOPRENE POWDER-FREE SURGICAL GLOVES WITH EMOLLIENT COATING: Brand: PROTEXIS

## (undated) DEVICE — INTENT OT USE PROVIDES A STERILE INTERFACE BETWEEN THE OPERATING ROOM SURGICAL LAMPS (NON-STERILE) AND THE SURGEON OR STAFF WORKING IN THE STERILE FIELD.: Brand: ASPEN® ALC PLUS LIGHT HANDLE COVER

## (undated) DEVICE — INFECTION CONTROL KIT SYS

## (undated) DEVICE — Z INACTIVE USE 2527070 DRAPE SURG W40XL44IN UNDERBUTTOCK SMS POLYPR W/ PCH BK DISP